# Patient Record
Sex: MALE | Race: WHITE | Employment: FULL TIME | URBAN - METROPOLITAN AREA
[De-identification: names, ages, dates, MRNs, and addresses within clinical notes are randomized per-mention and may not be internally consistent; named-entity substitution may affect disease eponyms.]

---

## 2018-07-20 ENCOUNTER — TRANSFERRED RECORDS (OUTPATIENT)
Dept: HEALTH INFORMATION MANAGEMENT | Facility: CLINIC | Age: 38
End: 2018-07-20

## 2018-07-21 ENCOUNTER — APPOINTMENT (OUTPATIENT)
Dept: GENERAL RADIOLOGY | Facility: CLINIC | Age: 38
End: 2018-07-21
Attending: THORACIC SURGERY (CARDIOTHORACIC VASCULAR SURGERY)
Payer: COMMERCIAL

## 2018-07-21 ENCOUNTER — ANESTHESIA (OUTPATIENT)
Dept: SURGERY | Facility: CLINIC | Age: 38
End: 2018-07-21
Payer: COMMERCIAL

## 2018-07-21 ENCOUNTER — HOSPITAL ENCOUNTER (INPATIENT)
Facility: CLINIC | Age: 38
LOS: 12 days | Discharge: HOME HEALTH | End: 2018-08-02
Attending: THORACIC SURGERY (CARDIOTHORACIC VASCULAR SURGERY) | Admitting: ANESTHESIOLOGY
Payer: COMMERCIAL

## 2018-07-21 ENCOUNTER — APPOINTMENT (OUTPATIENT)
Dept: CT IMAGING | Facility: CLINIC | Age: 38
End: 2018-07-21
Attending: THORACIC SURGERY (CARDIOTHORACIC VASCULAR SURGERY)
Payer: COMMERCIAL

## 2018-07-21 ENCOUNTER — ANESTHESIA EVENT (OUTPATIENT)
Dept: SURGERY | Facility: CLINIC | Age: 38
End: 2018-07-21
Payer: COMMERCIAL

## 2018-07-21 DIAGNOSIS — R19.4 BOWEL HABIT CHANGES: ICD-10-CM

## 2018-07-21 DIAGNOSIS — K22.3 ESOPHAGEAL PERFORATION: Primary | ICD-10-CM

## 2018-07-21 LAB
ABO + RH BLD: NORMAL
ABO + RH BLD: NORMAL
ANION GAP SERPL CALCULATED.3IONS-SCNC: 8 MMOL/L (ref 3–14)
BASE DEFICIT BLDA-SCNC: 2.3 MMOL/L
BASE DEFICIT BLDA-SCNC: 3.6 MMOL/L
BLD GP AB SCN SERPL QL: NORMAL
BLD PROD TYP BPU: NORMAL
BLD UNIT ID BPU: 0
BLD UNIT ID BPU: 0
BLOOD BANK CMNT PATIENT-IMP: NORMAL
BLOOD PRODUCT CODE: NORMAL
BLOOD PRODUCT CODE: NORMAL
BPU ID: NORMAL
BPU ID: NORMAL
BUN SERPL-MCNC: 25 MG/DL (ref 7–30)
CA-I BLD-MCNC: 4.3 MG/DL (ref 4.4–5.2)
CA-I BLD-MCNC: 4.6 MG/DL (ref 4.4–5.2)
CALCIUM SERPL-MCNC: 8.2 MG/DL (ref 8.5–10.1)
CHLORIDE SERPL-SCNC: 110 MMOL/L (ref 94–109)
CO2 SERPL-SCNC: 22 MMOL/L (ref 20–32)
CREAT SERPL-MCNC: 0.96 MG/DL (ref 0.66–1.25)
CREAT SERPL-MCNC: 0.97 MG/DL (ref 0.66–1.25)
ERYTHROCYTE [DISTWIDTH] IN BLOOD BY AUTOMATED COUNT: 13 % (ref 10–15)
GFR SERPL CREATININE-BSD FRML MDRD: 86 ML/MIN/1.7M2
GFR SERPL CREATININE-BSD FRML MDRD: 87 ML/MIN/1.7M2
GLUCOSE BLD-MCNC: 136 MG/DL (ref 70–99)
GLUCOSE BLD-MCNC: 149 MG/DL (ref 70–99)
GLUCOSE BLDC GLUCOMTR-MCNC: 110 MG/DL (ref 70–99)
GLUCOSE BLDC GLUCOMTR-MCNC: 130 MG/DL (ref 70–99)
GLUCOSE BLDC GLUCOMTR-MCNC: 140 MG/DL (ref 70–99)
GLUCOSE SERPL-MCNC: 162 MG/DL (ref 70–99)
HBA1C MFR BLD: 5.2 % (ref 0–5.6)
HCO3 BLD-SCNC: 21 MMOL/L (ref 21–28)
HCO3 BLD-SCNC: 22 MMOL/L (ref 21–28)
HCT VFR BLD AUTO: 41.4 % (ref 40–53)
HGB BLD-MCNC: 13.1 G/DL (ref 13.3–17.7)
HGB BLD-MCNC: 14 G/DL (ref 13.3–17.7)
HGB BLD-MCNC: 14.3 G/DL (ref 13.3–17.7)
INR PPP: 1.08 (ref 0.86–1.14)
LACTATE BLD-SCNC: 1.6 MMOL/L (ref 0.7–2)
LACTATE BLD-SCNC: 2 MMOL/L (ref 0.7–2)
MCH RBC QN AUTO: 29.5 PG (ref 26.5–33)
MCHC RBC AUTO-ENTMCNC: 34.5 G/DL (ref 31.5–36.5)
MCV RBC AUTO: 86 FL (ref 78–100)
MRSA DNA SPEC QL NAA+PROBE: NEGATIVE
NUM BPU REQUESTED: 2
O2/TOTAL GAS SETTING VFR VENT: ABNORMAL %
O2/TOTAL GAS SETTING VFR VENT: ABNORMAL %
PCO2 BLD: 34 MM HG (ref 35–45)
PCO2 BLD: 36 MM HG (ref 35–45)
PH BLD: 7.39 PH (ref 7.35–7.45)
PH BLD: 7.4 PH (ref 7.35–7.45)
PLATELET # BLD AUTO: 182 10E9/L (ref 150–450)
PLATELET # BLD AUTO: 199 10E9/L (ref 150–450)
PO2 BLD: 134 MM HG (ref 80–105)
PO2 BLD: 327 MM HG (ref 80–105)
POTASSIUM BLD-SCNC: 3.8 MMOL/L (ref 3.4–5.3)
POTASSIUM BLD-SCNC: 3.9 MMOL/L (ref 3.4–5.3)
POTASSIUM SERPL-SCNC: 4.3 MMOL/L (ref 3.4–5.3)
RBC # BLD AUTO: 4.84 10E12/L (ref 4.4–5.9)
SODIUM BLD-SCNC: 136 MMOL/L (ref 133–144)
SODIUM BLD-SCNC: 137 MMOL/L (ref 133–144)
SODIUM SERPL-SCNC: 140 MMOL/L (ref 133–144)
SPECIMEN EXP DATE BLD: NORMAL
SPECIMEN SOURCE: NORMAL
TRANSFUSION STATUS PATIENT QL: NORMAL
WBC # BLD AUTO: 14.4 10E9/L (ref 4–11)

## 2018-07-21 PROCEDURE — 36000064 ZZH SURGERY LEVEL 4 EA 15 ADDTL MIN - UMMC: Performed by: THORACIC SURGERY (CARDIOTHORACIC VASCULAR SURGERY)

## 2018-07-21 PROCEDURE — 85027 COMPLETE CBC AUTOMATED: CPT | Performed by: THORACIC SURGERY (CARDIOTHORACIC VASCULAR SURGERY)

## 2018-07-21 PROCEDURE — 86900 BLOOD TYPING SEROLOGIC ABO: CPT | Performed by: THORACIC SURGERY (CARDIOTHORACIC VASCULAR SURGERY)

## 2018-07-21 PROCEDURE — 00000146 ZZHCL STATISTIC GLUCOSE BY METER IP

## 2018-07-21 PROCEDURE — 25500064 ZZH RX 255 OP 636: Performed by: THORACIC SURGERY (CARDIOTHORACIC VASCULAR SURGERY)

## 2018-07-21 PROCEDURE — 86850 RBC ANTIBODY SCREEN: CPT | Performed by: THORACIC SURGERY (CARDIOTHORACIC VASCULAR SURGERY)

## 2018-07-21 PROCEDURE — 25000128 H RX IP 250 OP 636: Performed by: ANESTHESIOLOGY

## 2018-07-21 PROCEDURE — 0DU507Z SUPPLEMENT ESOPHAGUS WITH AUTOLOGOUS TISSUE SUBSTITUTE, OPEN APPROACH: ICD-10-PCS | Performed by: THORACIC SURGERY (CARDIOTHORACIC VASCULAR SURGERY)

## 2018-07-21 PROCEDURE — 82947 ASSAY GLUCOSE BLOOD QUANT: CPT | Performed by: THORACIC SURGERY (CARDIOTHORACIC VASCULAR SURGERY)

## 2018-07-21 PROCEDURE — 80048 BASIC METABOLIC PNL TOTAL CA: CPT | Performed by: THORACIC SURGERY (CARDIOTHORACIC VASCULAR SURGERY)

## 2018-07-21 PROCEDURE — 36415 COLL VENOUS BLD VENIPUNCTURE: CPT | Performed by: THORACIC SURGERY (CARDIOTHORACIC VASCULAR SURGERY)

## 2018-07-21 PROCEDURE — 25000128 H RX IP 250 OP 636: Performed by: NURSE ANESTHETIST, CERTIFIED REGISTERED

## 2018-07-21 PROCEDURE — 86923 COMPATIBILITY TEST ELECTRIC: CPT | Performed by: THORACIC SURGERY (CARDIOTHORACIC VASCULAR SURGERY)

## 2018-07-21 PROCEDURE — 84295 ASSAY OF SERUM SODIUM: CPT | Performed by: THORACIC SURGERY (CARDIOTHORACIC VASCULAR SURGERY)

## 2018-07-21 PROCEDURE — 71260 CT THORAX DX C+: CPT

## 2018-07-21 PROCEDURE — 25000128 H RX IP 250 OP 636: Performed by: THORACIC SURGERY (CARDIOTHORACIC VASCULAR SURGERY)

## 2018-07-21 PROCEDURE — 82565 ASSAY OF CREATININE: CPT | Performed by: THORACIC SURGERY (CARDIOTHORACIC VASCULAR SURGERY)

## 2018-07-21 PROCEDURE — C9399 UNCLASSIFIED DRUGS OR BIOLOG: HCPCS | Performed by: NURSE ANESTHETIST, CERTIFIED REGISTERED

## 2018-07-21 PROCEDURE — 71000014 ZZH RECOVERY PHASE 1 LEVEL 2 FIRST HR: Performed by: THORACIC SURGERY (CARDIOTHORACIC VASCULAR SURGERY)

## 2018-07-21 PROCEDURE — 25000125 ZZHC RX 250: Performed by: NURSE ANESTHETIST, CERTIFIED REGISTERED

## 2018-07-21 PROCEDURE — 25000131 ZZH RX MED GY IP 250 OP 636 PS 637: Performed by: SURGERY

## 2018-07-21 PROCEDURE — 36000062 ZZH SURGERY LEVEL 4 1ST 30 MIN - UMMC: Performed by: THORACIC SURGERY (CARDIOTHORACIC VASCULAR SURGERY)

## 2018-07-21 PROCEDURE — 0DQ50ZZ REPAIR ESOPHAGUS, OPEN APPROACH: ICD-10-PCS | Performed by: THORACIC SURGERY (CARDIOTHORACIC VASCULAR SURGERY)

## 2018-07-21 PROCEDURE — 25000128 H RX IP 250 OP 636

## 2018-07-21 PROCEDURE — 83605 ASSAY OF LACTIC ACID: CPT | Performed by: THORACIC SURGERY (CARDIOTHORACIC VASCULAR SURGERY)

## 2018-07-21 PROCEDURE — C1769 GUIDE WIRE: HCPCS | Performed by: THORACIC SURGERY (CARDIOTHORACIC VASCULAR SURGERY)

## 2018-07-21 PROCEDURE — 25000128 H RX IP 250 OP 636: Performed by: STUDENT IN AN ORGANIZED HEALTH CARE EDUCATION/TRAINING PROGRAM

## 2018-07-21 PROCEDURE — 40000277 XR SURGERY CARM FLUORO LESS THAN 5 MIN W STILLS: Mod: TC

## 2018-07-21 PROCEDURE — 40000986 XR CHEST PORT 1 VW

## 2018-07-21 PROCEDURE — C9290 INJ, BUPIVACAINE LIPOSOME: HCPCS | Performed by: THORACIC SURGERY (CARDIOTHORACIC VASCULAR SURGERY)

## 2018-07-21 PROCEDURE — 85610 PROTHROMBIN TIME: CPT | Performed by: THORACIC SURGERY (CARDIOTHORACIC VASCULAR SURGERY)

## 2018-07-21 PROCEDURE — 82330 ASSAY OF CALCIUM: CPT | Performed by: THORACIC SURGERY (CARDIOTHORACIC VASCULAR SURGERY)

## 2018-07-21 PROCEDURE — 0DJ08ZZ INSPECTION OF UPPER INTESTINAL TRACT, VIA NATURAL OR ARTIFICIAL OPENING ENDOSCOPIC: ICD-10-PCS | Performed by: THORACIC SURGERY (CARDIOTHORACIC VASCULAR SURGERY)

## 2018-07-21 PROCEDURE — 0BJ08ZZ INSPECTION OF TRACHEOBRONCHIAL TREE, VIA NATURAL OR ARTIFICIAL OPENING ENDOSCOPIC: ICD-10-PCS | Performed by: THORACIC SURGERY (CARDIOTHORACIC VASCULAR SURGERY)

## 2018-07-21 PROCEDURE — 0KXJ0ZZ TRANSFER LEFT THORAX MUSCLE, OPEN APPROACH: ICD-10-PCS | Performed by: THORACIC SURGERY (CARDIOTHORACIC VASCULAR SURGERY)

## 2018-07-21 PROCEDURE — 25000128 H RX IP 250 OP 636: Performed by: SURGERY

## 2018-07-21 PROCEDURE — C1894 INTRO/SHEATH, NON-LASER: HCPCS | Performed by: THORACIC SURGERY (CARDIOTHORACIC VASCULAR SURGERY)

## 2018-07-21 PROCEDURE — 20000004 ZZH R&B ICU UMMC

## 2018-07-21 PROCEDURE — 0D750DZ DILATION OF ESOPHAGUS WITH INTRALUMINAL DEVICE, OPEN APPROACH: ICD-10-PCS | Performed by: THORACIC SURGERY (CARDIOTHORACIC VASCULAR SURGERY)

## 2018-07-21 PROCEDURE — 37000008 ZZH ANESTHESIA TECHNICAL FEE, 1ST 30 MIN: Performed by: THORACIC SURGERY (CARDIOTHORACIC VASCULAR SURGERY)

## 2018-07-21 PROCEDURE — 37000009 ZZH ANESTHESIA TECHNICAL FEE, EACH ADDTL 15 MIN: Performed by: THORACIC SURGERY (CARDIOTHORACIC VASCULAR SURGERY)

## 2018-07-21 PROCEDURE — 87641 MR-STAPH DNA AMP PROBE: CPT | Performed by: THORACIC SURGERY (CARDIOTHORACIC VASCULAR SURGERY)

## 2018-07-21 PROCEDURE — 0W9930Z DRAINAGE OF RIGHT PLEURAL CAVITY WITH DRAINAGE DEVICE, PERCUTANEOUS APPROACH: ICD-10-PCS | Performed by: THORACIC SURGERY (CARDIOTHORACIC VASCULAR SURGERY)

## 2018-07-21 PROCEDURE — 87640 STAPH A DNA AMP PROBE: CPT | Performed by: THORACIC SURGERY (CARDIOTHORACIC VASCULAR SURGERY)

## 2018-07-21 PROCEDURE — 83036 HEMOGLOBIN GLYCOSYLATED A1C: CPT | Performed by: THORACIC SURGERY (CARDIOTHORACIC VASCULAR SURGERY)

## 2018-07-21 PROCEDURE — 27210794 ZZH OR GENERAL SUPPLY STERILE: Performed by: THORACIC SURGERY (CARDIOTHORACIC VASCULAR SURGERY)

## 2018-07-21 PROCEDURE — 84132 ASSAY OF SERUM POTASSIUM: CPT | Performed by: THORACIC SURGERY (CARDIOTHORACIC VASCULAR SURGERY)

## 2018-07-21 PROCEDURE — 71000015 ZZH RECOVERY PHASE 1 LEVEL 2 EA ADDTL HR: Performed by: THORACIC SURGERY (CARDIOTHORACIC VASCULAR SURGERY)

## 2018-07-21 PROCEDURE — C1874 STENT, COATED/COV W/DEL SYS: HCPCS | Performed by: THORACIC SURGERY (CARDIOTHORACIC VASCULAR SURGERY)

## 2018-07-21 PROCEDURE — P9016 RBC LEUKOCYTES REDUCED: HCPCS | Performed by: THORACIC SURGERY (CARDIOTHORACIC VASCULAR SURGERY)

## 2018-07-21 PROCEDURE — 0DHA3UZ INSERTION OF FEEDING DEVICE INTO JEJUNUM, PERCUTANEOUS APPROACH: ICD-10-PCS | Performed by: THORACIC SURGERY (CARDIOTHORACIC VASCULAR SURGERY)

## 2018-07-21 PROCEDURE — 86901 BLOOD TYPING SEROLOGIC RH(D): CPT | Performed by: THORACIC SURGERY (CARDIOTHORACIC VASCULAR SURGERY)

## 2018-07-21 PROCEDURE — 71045 X-RAY EXAM CHEST 1 VIEW: CPT

## 2018-07-21 PROCEDURE — 85049 AUTOMATED PLATELET COUNT: CPT | Performed by: THORACIC SURGERY (CARDIOTHORACIC VASCULAR SURGERY)

## 2018-07-21 PROCEDURE — C1887 CATHETER, GUIDING: HCPCS | Performed by: THORACIC SURGERY (CARDIOTHORACIC VASCULAR SURGERY)

## 2018-07-21 PROCEDURE — 82803 BLOOD GASES ANY COMBINATION: CPT | Performed by: THORACIC SURGERY (CARDIOTHORACIC VASCULAR SURGERY)

## 2018-07-21 PROCEDURE — 0DH63UZ INSERTION OF FEEDING DEVICE INTO STOMACH, PERCUTANEOUS APPROACH: ICD-10-PCS | Performed by: THORACIC SURGERY (CARDIOTHORACIC VASCULAR SURGERY)

## 2018-07-21 PROCEDURE — 25000565 ZZH ISOFLURANE, EA 15 MIN: Performed by: THORACIC SURGERY (CARDIOTHORACIC VASCULAR SURGERY)

## 2018-07-21 DEVICE — IMPLANTABLE DEVICE
Type: IMPLANTABLE DEVICE | Site: ESOPHAGUS | Status: NON-FUNCTIONAL
Removed: 2018-08-15

## 2018-07-21 RX ORDER — IOPAMIDOL 755 MG/ML
80 INJECTION, SOLUTION INTRAVASCULAR ONCE
Status: COMPLETED | OUTPATIENT
Start: 2018-07-21 | End: 2018-07-21

## 2018-07-21 RX ORDER — DEXTROSE MONOHYDRATE 25 G/50ML
25-50 INJECTION, SOLUTION INTRAVENOUS
Status: DISCONTINUED | OUTPATIENT
Start: 2018-07-21 | End: 2018-07-21

## 2018-07-21 RX ORDER — POTASSIUM CHLORIDE 29.8 MG/ML
20 INJECTION INTRAVENOUS
Status: DISCONTINUED | OUTPATIENT
Start: 2018-07-21 | End: 2018-08-02 | Stop reason: HOSPADM

## 2018-07-21 RX ORDER — PROPOFOL 10 MG/ML
INJECTION, EMULSION INTRAVENOUS PRN
Status: DISCONTINUED | OUTPATIENT
Start: 2018-07-21 | End: 2018-07-21

## 2018-07-21 RX ORDER — FENTANYL CITRATE 50 UG/ML
25-50 INJECTION, SOLUTION INTRAMUSCULAR; INTRAVENOUS
Status: DISCONTINUED | OUTPATIENT
Start: 2018-07-21 | End: 2018-07-21 | Stop reason: HOSPADM

## 2018-07-21 RX ORDER — CALCIUM CHLORIDE 100 MG/ML
INJECTION INTRAVENOUS; INTRAVENTRICULAR PRN
Status: DISCONTINUED | OUTPATIENT
Start: 2018-07-21 | End: 2018-07-21

## 2018-07-21 RX ORDER — NALOXONE HYDROCHLORIDE 0.4 MG/ML
.1-.4 INJECTION, SOLUTION INTRAMUSCULAR; INTRAVENOUS; SUBCUTANEOUS
Status: DISCONTINUED | OUTPATIENT
Start: 2018-07-21 | End: 2018-07-21

## 2018-07-21 RX ORDER — ONDANSETRON 4 MG/1
4 TABLET, ORALLY DISINTEGRATING ORAL EVERY 6 HOURS PRN
Status: DISCONTINUED | OUTPATIENT
Start: 2018-07-21 | End: 2018-08-02 | Stop reason: HOSPADM

## 2018-07-21 RX ORDER — ONDANSETRON 2 MG/ML
4 INJECTION INTRAMUSCULAR; INTRAVENOUS EVERY 30 MIN PRN
Status: DISCONTINUED | OUTPATIENT
Start: 2018-07-21 | End: 2018-07-21 | Stop reason: HOSPADM

## 2018-07-21 RX ORDER — PROCHLORPERAZINE MALEATE 5 MG
10 TABLET ORAL EVERY 6 HOURS PRN
Status: DISCONTINUED | OUTPATIENT
Start: 2018-07-21 | End: 2018-08-02 | Stop reason: HOSPADM

## 2018-07-21 RX ORDER — ONDANSETRON 2 MG/ML
4 INJECTION INTRAMUSCULAR; INTRAVENOUS EVERY 6 HOURS PRN
Status: DISCONTINUED | OUTPATIENT
Start: 2018-07-21 | End: 2018-07-21

## 2018-07-21 RX ORDER — NICOTINE POLACRILEX 4 MG
15-30 LOZENGE BUCCAL
Status: DISCONTINUED | OUTPATIENT
Start: 2018-07-21 | End: 2018-08-02 | Stop reason: HOSPADM

## 2018-07-21 RX ORDER — MAGNESIUM SULFATE HEPTAHYDRATE 40 MG/ML
2 INJECTION, SOLUTION INTRAVENOUS DAILY PRN
Status: DISCONTINUED | OUTPATIENT
Start: 2018-07-21 | End: 2018-08-02 | Stop reason: HOSPADM

## 2018-07-21 RX ORDER — FLUCONAZOLE 2 MG/ML
400 INJECTION, SOLUTION INTRAVENOUS EVERY 24 HOURS
Status: DISCONTINUED | OUTPATIENT
Start: 2018-07-21 | End: 2018-07-30

## 2018-07-21 RX ORDER — LIDOCAINE HYDROCHLORIDE 20 MG/ML
INJECTION, SOLUTION INFILTRATION; PERINEURAL PRN
Status: DISCONTINUED | OUTPATIENT
Start: 2018-07-21 | End: 2018-07-21

## 2018-07-21 RX ORDER — SODIUM CHLORIDE, SODIUM LACTATE, POTASSIUM CHLORIDE, CALCIUM CHLORIDE 600; 310; 30; 20 MG/100ML; MG/100ML; MG/100ML; MG/100ML
INJECTION, SOLUTION INTRAVENOUS
Status: COMPLETED
Start: 2018-07-21 | End: 2018-07-21

## 2018-07-21 RX ORDER — POTASSIUM CHLORIDE 750 MG/1
20-40 TABLET, EXTENDED RELEASE ORAL
Status: DISCONTINUED | OUTPATIENT
Start: 2018-07-21 | End: 2018-08-02 | Stop reason: HOSPADM

## 2018-07-21 RX ORDER — NALOXONE HYDROCHLORIDE 0.4 MG/ML
.1-.4 INJECTION, SOLUTION INTRAMUSCULAR; INTRAVENOUS; SUBCUTANEOUS
Status: DISCONTINUED | OUTPATIENT
Start: 2018-07-21 | End: 2018-08-02 | Stop reason: HOSPADM

## 2018-07-21 RX ORDER — POTASSIUM CHLORIDE 7.45 MG/ML
10 INJECTION INTRAVENOUS
Status: DISCONTINUED | OUTPATIENT
Start: 2018-07-21 | End: 2018-08-02 | Stop reason: HOSPADM

## 2018-07-21 RX ORDER — ONDANSETRON 4 MG/1
4 TABLET, ORALLY DISINTEGRATING ORAL EVERY 30 MIN PRN
Status: DISCONTINUED | OUTPATIENT
Start: 2018-07-21 | End: 2018-07-21 | Stop reason: HOSPADM

## 2018-07-21 RX ORDER — HYDROMORPHONE HYDROCHLORIDE 1 MG/ML
.3-.5 INJECTION, SOLUTION INTRAMUSCULAR; INTRAVENOUS; SUBCUTANEOUS
Status: DISCONTINUED | OUTPATIENT
Start: 2018-07-21 | End: 2018-07-21

## 2018-07-21 RX ORDER — ACETAMINOPHEN 10 MG/ML
1000 INJECTION, SOLUTION INTRAVENOUS ONCE
Status: COMPLETED | OUTPATIENT
Start: 2018-07-21 | End: 2018-07-21

## 2018-07-21 RX ORDER — POTASSIUM CL/LIDO/0.9 % NACL 10MEQ/0.1L
10 INTRAVENOUS SOLUTION, PIGGYBACK (ML) INTRAVENOUS
Status: DISCONTINUED | OUTPATIENT
Start: 2018-07-21 | End: 2018-08-02 | Stop reason: HOSPADM

## 2018-07-21 RX ORDER — PIPERACILLIN SODIUM, TAZOBACTAM SODIUM 3; .375 G/15ML; G/15ML
3.38 INJECTION, POWDER, LYOPHILIZED, FOR SOLUTION INTRAVENOUS EVERY 6 HOURS
Status: DISCONTINUED | OUTPATIENT
Start: 2018-07-21 | End: 2018-07-21

## 2018-07-21 RX ORDER — SODIUM CHLORIDE, SODIUM LACTATE, POTASSIUM CHLORIDE, CALCIUM CHLORIDE 600; 310; 30; 20 MG/100ML; MG/100ML; MG/100ML; MG/100ML
INJECTION, SOLUTION INTRAVENOUS CONTINUOUS
Status: DISCONTINUED | OUTPATIENT
Start: 2018-07-21 | End: 2018-07-22

## 2018-07-21 RX ORDER — HYDROMORPHONE HYDROCHLORIDE 1 MG/ML
.5-1 INJECTION, SOLUTION INTRAMUSCULAR; INTRAVENOUS; SUBCUTANEOUS
Status: DISCONTINUED | OUTPATIENT
Start: 2018-07-21 | End: 2018-07-22

## 2018-07-21 RX ORDER — ONDANSETRON 2 MG/ML
INJECTION INTRAMUSCULAR; INTRAVENOUS PRN
Status: DISCONTINUED | OUTPATIENT
Start: 2018-07-21 | End: 2018-07-21

## 2018-07-21 RX ORDER — HYDROMORPHONE HYDROCHLORIDE 1 MG/ML
.3-.5 INJECTION, SOLUTION INTRAMUSCULAR; INTRAVENOUS; SUBCUTANEOUS
Status: DISCONTINUED | OUTPATIENT
Start: 2018-07-21 | End: 2018-07-21 | Stop reason: ALTCHOICE

## 2018-07-21 RX ORDER — ONDANSETRON 2 MG/ML
4 INJECTION INTRAMUSCULAR; INTRAVENOUS EVERY 6 HOURS PRN
Status: DISCONTINUED | OUTPATIENT
Start: 2018-07-21 | End: 2018-08-02 | Stop reason: HOSPADM

## 2018-07-21 RX ORDER — POTASSIUM CHLORIDE 1.5 G/1.58G
20-40 POWDER, FOR SOLUTION ORAL
Status: DISCONTINUED | OUTPATIENT
Start: 2018-07-21 | End: 2018-08-02 | Stop reason: HOSPADM

## 2018-07-21 RX ORDER — NICOTINE POLACRILEX 4 MG
15-30 LOZENGE BUCCAL
Status: DISCONTINUED | OUTPATIENT
Start: 2018-07-21 | End: 2018-07-21

## 2018-07-21 RX ORDER — MULTIVITAMIN
1 CAPSULE ORAL DAILY
COMMUNITY
Start: 2018-03-27

## 2018-07-21 RX ORDER — FENTANYL CITRATE 50 UG/ML
INJECTION, SOLUTION INTRAMUSCULAR; INTRAVENOUS PRN
Status: DISCONTINUED | OUTPATIENT
Start: 2018-07-21 | End: 2018-07-21

## 2018-07-21 RX ORDER — ESMOLOL HYDROCHLORIDE 10 MG/ML
INJECTION INTRAVENOUS PRN
Status: DISCONTINUED | OUTPATIENT
Start: 2018-07-21 | End: 2018-07-21

## 2018-07-21 RX ORDER — ONDANSETRON 4 MG/1
4 TABLET, ORALLY DISINTEGRATING ORAL EVERY 6 HOURS PRN
Status: DISCONTINUED | OUTPATIENT
Start: 2018-07-21 | End: 2018-07-21

## 2018-07-21 RX ORDER — CAFFEINE 200 MG
200 TABLET ORAL DAILY PRN
Status: ON HOLD | COMMUNITY
End: 2018-08-01

## 2018-07-21 RX ORDER — MAGNESIUM SULFATE HEPTAHYDRATE 40 MG/ML
4 INJECTION, SOLUTION INTRAVENOUS EVERY 4 HOURS PRN
Status: DISCONTINUED | OUTPATIENT
Start: 2018-07-21 | End: 2018-08-02 | Stop reason: HOSPADM

## 2018-07-21 RX ORDER — PIPERACILLIN SODIUM, TAZOBACTAM SODIUM 3; .375 G/15ML; G/15ML
3.38 INJECTION, POWDER, LYOPHILIZED, FOR SOLUTION INTRAVENOUS EVERY 6 HOURS
Status: DISCONTINUED | OUTPATIENT
Start: 2018-07-21 | End: 2018-07-30

## 2018-07-21 RX ORDER — HYDROMORPHONE HYDROCHLORIDE 1 MG/ML
.3-.5 INJECTION, SOLUTION INTRAMUSCULAR; INTRAVENOUS; SUBCUTANEOUS EVERY 5 MIN PRN
Status: DISCONTINUED | OUTPATIENT
Start: 2018-07-21 | End: 2018-07-21 | Stop reason: HOSPADM

## 2018-07-21 RX ORDER — DEXTROSE MONOHYDRATE 25 G/50ML
25-50 INJECTION, SOLUTION INTRAVENOUS
Status: DISCONTINUED | OUTPATIENT
Start: 2018-07-21 | End: 2018-08-02 | Stop reason: HOSPADM

## 2018-07-21 RX ORDER — SODIUM CHLORIDE, SODIUM LACTATE, POTASSIUM CHLORIDE, CALCIUM CHLORIDE 600; 310; 30; 20 MG/100ML; MG/100ML; MG/100ML; MG/100ML
INJECTION, SOLUTION INTRAVENOUS CONTINUOUS
Status: DISCONTINUED | OUTPATIENT
Start: 2018-07-21 | End: 2018-07-21 | Stop reason: HOSPADM

## 2018-07-21 RX ADMIN — PIPERACILLIN SODIUM AND TAZOBACTAM SODIUM 2.25 G: 3; .375 INJECTION, POWDER, LYOPHILIZED, FOR SOLUTION INTRAVENOUS at 08:35

## 2018-07-21 RX ADMIN — Medication 0.5 MG: at 03:33

## 2018-07-21 RX ADMIN — ESMOLOL HYDROCHLORIDE 60 MG: 10 INJECTION, SOLUTION INTRAVENOUS at 05:57

## 2018-07-21 RX ADMIN — PIPERACILLIN SODIUM AND TAZOBACTAM SODIUM 3.38 G: 3; .375 INJECTION, POWDER, LYOPHILIZED, FOR SOLUTION INTRAVENOUS at 16:14

## 2018-07-21 RX ADMIN — ONDANSETRON 4 MG: 2 INJECTION INTRAMUSCULAR; INTRAVENOUS at 09:49

## 2018-07-21 RX ADMIN — FENTANYL CITRATE 50 MCG: 50 INJECTION, SOLUTION INTRAMUSCULAR; INTRAVENOUS at 07:11

## 2018-07-21 RX ADMIN — FENTANYL CITRATE 100 MCG: 50 INJECTION, SOLUTION INTRAMUSCULAR; INTRAVENOUS at 06:33

## 2018-07-21 RX ADMIN — PIPERACILLIN SODIUM AND TAZOBACTAM SODIUM 2.25 G: 3; .375 INJECTION, POWDER, LYOPHILIZED, FOR SOLUTION INTRAVENOUS at 10:34

## 2018-07-21 RX ADMIN — CALCIUM CHLORIDE 300 MG: 100 INJECTION, SOLUTION INTRAVENOUS at 08:37

## 2018-07-21 RX ADMIN — HYDROMORPHONE HYDROCHLORIDE: 10 INJECTION, SOLUTION INTRAMUSCULAR; INTRAVENOUS; SUBCUTANEOUS at 12:35

## 2018-07-21 RX ADMIN — IOPAMIDOL 80 ML: 755 INJECTION, SOLUTION INTRAVENOUS at 03:02

## 2018-07-21 RX ADMIN — MIDAZOLAM 2 MG: 1 INJECTION INTRAMUSCULAR; INTRAVENOUS at 05:35

## 2018-07-21 RX ADMIN — VANCOMYCIN HYDROCHLORIDE 1750 MG: 10 INJECTION, POWDER, LYOPHILIZED, FOR SOLUTION INTRAVENOUS at 16:45

## 2018-07-21 RX ADMIN — LIDOCAINE HYDROCHLORIDE 60 MG: 20 INJECTION, SOLUTION INFILTRATION; PERINEURAL at 05:57

## 2018-07-21 RX ADMIN — ROCURONIUM BROMIDE 30 MG: 10 INJECTION INTRAVENOUS at 08:22

## 2018-07-21 RX ADMIN — PROPOFOL 160 MG: 10 INJECTION, EMULSION INTRAVENOUS at 05:57

## 2018-07-21 RX ADMIN — SODIUM CHLORIDE, POTASSIUM CHLORIDE, SODIUM LACTATE AND CALCIUM CHLORIDE 1000 ML: 600; 310; 30; 20 INJECTION, SOLUTION INTRAVENOUS at 01:00

## 2018-07-21 RX ADMIN — FLUCONAZOLE IN SODIUM CHLORIDE 400 MG: 2 INJECTION, SOLUTION INTRAVENOUS at 03:37

## 2018-07-21 RX ADMIN — ACETAMINOPHEN 1000 MG: 10 INJECTION, SOLUTION INTRAVENOUS at 05:06

## 2018-07-21 RX ADMIN — SODIUM CHLORIDE, POTASSIUM CHLORIDE, SODIUM LACTATE AND CALCIUM CHLORIDE: 600; 310; 30; 20 INJECTION, SOLUTION INTRAVENOUS at 12:25

## 2018-07-21 RX ADMIN — VANCOMYCIN HYDROCHLORIDE 1750 MG: 10 INJECTION, POWDER, LYOPHILIZED, FOR SOLUTION INTRAVENOUS at 05:21

## 2018-07-21 RX ADMIN — SODIUM CHLORIDE, POTASSIUM CHLORIDE, SODIUM LACTATE AND CALCIUM CHLORIDE: 600; 310; 30; 20 INJECTION, SOLUTION INTRAVENOUS at 01:36

## 2018-07-21 RX ADMIN — PHENYLEPHRINE HYDROCHLORIDE 50 MCG: 10 INJECTION, SOLUTION INTRAMUSCULAR; INTRAVENOUS; SUBCUTANEOUS at 10:11

## 2018-07-21 RX ADMIN — FENTANYL CITRATE 100 MCG: 50 INJECTION, SOLUTION INTRAMUSCULAR; INTRAVENOUS at 08:38

## 2018-07-21 RX ADMIN — PHENYLEPHRINE HYDROCHLORIDE 50 MCG: 10 INJECTION, SOLUTION INTRAMUSCULAR; INTRAVENOUS; SUBCUTANEOUS at 10:08

## 2018-07-21 RX ADMIN — FENTANYL CITRATE 100 MCG: 50 INJECTION, SOLUTION INTRAMUSCULAR; INTRAVENOUS at 08:45

## 2018-07-21 RX ADMIN — ROCURONIUM BROMIDE 50 MG: 10 INJECTION INTRAVENOUS at 06:56

## 2018-07-21 RX ADMIN — DEXMEDETOMIDINE HYDROCHLORIDE 16 MCG: 100 INJECTION, SOLUTION INTRAVENOUS at 11:27

## 2018-07-21 RX ADMIN — Medication 1 MG: at 05:27

## 2018-07-21 RX ADMIN — INSULIN ASPART 1 UNITS: 100 INJECTION, SOLUTION INTRAVENOUS; SUBCUTANEOUS at 03:42

## 2018-07-21 RX ADMIN — Medication 0.5 MG: at 02:16

## 2018-07-21 RX ADMIN — DEXMEDETOMIDINE HYDROCHLORIDE 8 MCG: 100 INJECTION, SOLUTION INTRAVENOUS at 11:35

## 2018-07-21 RX ADMIN — ROCURONIUM BROMIDE 20 MG: 10 INJECTION INTRAVENOUS at 09:06

## 2018-07-21 RX ADMIN — ROCURONIUM BROMIDE 20 MG: 10 INJECTION INTRAVENOUS at 06:33

## 2018-07-21 RX ADMIN — PHENYLEPHRINE HYDROCHLORIDE 200 MCG: 10 INJECTION, SOLUTION INTRAMUSCULAR; INTRAVENOUS; SUBCUTANEOUS at 06:06

## 2018-07-21 RX ADMIN — Medication 1 MG: at 04:27

## 2018-07-21 RX ADMIN — FENTANYL CITRATE 100 MCG: 50 INJECTION, SOLUTION INTRAMUSCULAR; INTRAVENOUS at 08:28

## 2018-07-21 RX ADMIN — ROCURONIUM BROMIDE 80 MG: 10 INJECTION INTRAVENOUS at 05:57

## 2018-07-21 RX ADMIN — SUGAMMADEX 200 MG: 100 INJECTION, SOLUTION INTRAVENOUS at 11:08

## 2018-07-21 RX ADMIN — FENTANYL CITRATE 50 MCG: 50 INJECTION, SOLUTION INTRAMUSCULAR; INTRAVENOUS at 07:08

## 2018-07-21 RX ADMIN — HYDROMORPHONE HYDROCHLORIDE 0.5 MG: 1 INJECTION, SOLUTION INTRAMUSCULAR; INTRAVENOUS; SUBCUTANEOUS at 09:02

## 2018-07-21 RX ADMIN — PIPERACILLIN SODIUM AND TAZOBACTAM SODIUM 3.38 G: 3; .375 INJECTION, POWDER, LYOPHILIZED, FOR SOLUTION INTRAVENOUS at 04:48

## 2018-07-21 RX ADMIN — FENTANYL CITRATE 50 MCG: 50 INJECTION, SOLUTION INTRAMUSCULAR; INTRAVENOUS at 11:43

## 2018-07-21 RX ADMIN — PIPERACILLIN SODIUM AND TAZOBACTAM SODIUM 3.38 G: 3; .375 INJECTION, POWDER, LYOPHILIZED, FOR SOLUTION INTRAVENOUS at 22:08

## 2018-07-21 ASSESSMENT — ACTIVITIES OF DAILY LIVING (ADL)
DRESS: 0-->INDEPENDENT
COGNITION: 0 - NO COGNITION ISSUES REPORTED
AMBULATION: 0 - INDEPENDENT
DRESS: 0 - INDEPENDENT
ADLS_ACUITY_SCORE: 10
EATING: OTHER (SEE COMMENTS)
RETIRED_COMMUNICATION: 0-->UNDERSTANDS/COMMUNICATES WITHOUT DIFFICULTY
SWALLOWING: OTHER (SEE COMMENTS)
BATHING: 0-->INDEPENDENT
TRANSFERRING: 0-->INDEPENDENT
COMMUNICATION: 0 - UNDERSTANDS/COMMUNICATES WITHOUT DIFFICULTY
AMBULATION: 0-->INDEPENDENT
ADLS_ACUITY_SCORE: 9
TOILETING: 0 - INDEPENDENT
BATHING: 0 - INDEPENDENT
TRANSFERRING: 0 - INDEPENDENT
WHICH_OF_THE_ABOVE_FUNCTIONAL_RISKS_HAD_A_RECENT_ONSET_OR_CHANGE?: SWALLOWING
RETIRED_EATING: 0-->INDEPENDENT
SWALLOWING: 2-->DIFFICULTY SWALLOWING FOODS
CHANGE_IN_FUNCTIONAL_STATUS_SINCE_ONSET_OF_CURRENT_ILLNESS/INJURY: NO
FALL_HISTORY_WITHIN_LAST_SIX_MONTHS: NO
TOILETING: 0-->INDEPENDENT
ADLS_ACUITY_SCORE: 9

## 2018-07-21 ASSESSMENT — PAIN DESCRIPTION - DESCRIPTORS
DESCRIPTORS: SHARP

## 2018-07-21 NOTE — H&P
SURGICAL ICU ADMISSION NOTE  7/21/2018    PRIMARY TEAM: Thoracic Surgery  PRIMARY PHYSICIAN: Dr. Miranda    REASON FOR CRITICAL CARE ADMISSION: esophageal perforation   ADMITTING PHYSICIAN: Dr. Epps    ASSESSMENT: Mr Constantino is a 36 yo M who is otherwise who presents for an esophageal perforation that occurred about 12 hours ago. He is hemodynamically stable but does have a significant amount of pain. Will obtain CT of chest to evaluate perforation, free vs contained.    PLAN:   Neuro/ pain/ sedation:  - Monitor neurological status. Notify the MD for any acute changes in exam.  - Dilaudid q2h prn     Pulmonary care:   -Supplemental oxygen to keep saturation above 92 %.  -IS      Cardiovascular:    - Monitor hemodynamic status.      GI care:   - Strict NPO  - PPI  - CT chest/abdomen with PO contrast     Fluids/ Electrolytes/ Nutrition:   - 1L bolus of LR on arrival  - LR at 120 for IV fluid hydration  - ICU electrolyte replacement protocol     Renal/ Fluid Balance:    - Will monitor intake and output.  - Patient voiding spontaneously     Endocrine:    - SSI     ID/ Antibiotics:  - Zoysn, vanc, fluconazole for esophageal perforation     Heme:     - CBC now     Prophylaxis:    - Mechanical prophylaxis for DVT.  - No chemical DVT prophylaxis due to potential surgical intervention     Lines/ tubes/ drains:  - PIV     Disposition:  - Surgical ICU.    Patient seen, findings and plan discussed with surgical ICU staff.    Marjorie Steen MD  General Surgery, PGY-2  Pg [phone]    - - - - - - - - - - - - - - - - - - - - - - - - - - - - - - - - - - - - - - - - - - - - - - - - - - - - - - - - - - - - - - - - - - - - - - - -     HISTORY PRESENTING ILLNESS: Mr. Constatnino is a 36 yo M who is otherwise healthy who was eating a hand full of popcorn around 1345 on Friday 7/20. He had difficulty swallowing the popcorn and developed sudden, severe epigastric pain. He also spit up some blood just once following this episode. He  became diaphoretic and felt as if he was going to pass out. Patient reports he intermittently has difficulties swallowing food but he attributes this to not chewing his food properly. He experienced one episode 7-8 years ago where he had significant pain after swallowing a pizza roll up. He reports imaging studies were done where he swallowed contrast and did not demonstrate any abnormalities. He was found to have pneumomediastinum after a CT and MRI of the abdomen at [place]. He transferred to [facility] and eventually transferred to [facility] for definitive care.     REVIEW OF SYSTEMS: 10 point ROS neg other than the symptoms noted above in the HPI.    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    ALLERGIES:      Allergies   Allergen Reactions     Naproxen      Other reaction(s): Stomach Upset  Pt states he can tolerate it.     Sulfa Drugs Itching     Eyes became swollen after being treated with sulfa antibiotics.      MEDICATIONS:  Caffeine  Multivitamin    PHYSICAL EXAMINATION:  Temp:  [99.5  F (37.5  C)] 99.5  F (37.5  C)  Heart Rate:  [90-96] 90  Resp:  [23-24] 23  BP: (121-123)/(70-71) 121/70  SpO2:  [97 %] 97 %    General: awake, alert, NAD, poor dentition  CV: RRR  Pulm: Non-labored breathing, no crepitus  Abd: Soft, TTP in the epigastrium, no peritoneal signs  Extremities: wwp    LABS: Reviewed.   Complete Blood Count     Recent Labs  Lab 07/21/18 0130   WBC 14.4*   HGB 14.3        Basic Metabolic Panel    Recent Labs  Lab 07/21/18 0130      POTASSIUM 4.3   CHLORIDE 110*   CO2 22   BUN 25   CR 0.96   *     Liver Function Tests    Recent Labs  Lab 07/21/18 0130   INR 1.08     Pancreatic Enzymes  No lab  results found in last 7 days.  Coagulation Profile    Recent Labs  Lab 07/21/18  0130   INR 1.08       IMAGING:  No results found for this or any previous visit (from the past 24 hour(s)).

## 2018-07-21 NOTE — PHARMACY-VANCOMYCIN DOSING SERVICE
Pharmacy Vancomycin Initial Note  Date of Service 2018  Patient's  1980  37 year old, male    Indication: Esophageal Perforation    Current estimated CrCl = Estimated Creatinine Clearance: 106.8 mL/min (based on Cr of 0.96).    Creatinine for last 3 days  2018:  1:30 AM Creatinine 0.96 mg/dL    Recent Vancomycin Level(s) for last 3 days  No results found for requested labs within last 72 hours.      Vancomycin IV Administrations (past 72 hours)      No vancomycin orders with administrations in past 72 hours.                Nephrotoxins and other renal medications (Future)    Start     Dose/Rate Route Frequency Ordered Stop    18 0430  vancomycin (VANCOCIN) 1,750 mg in sodium chloride 0.9 % 500 mL intermittent infusion      1,750 mg  over 2 Hours Intravenous EVERY 12 HOURS 18 0241      18 0400  piperacillin-tazobactam (ZOSYN) 3.375 g vial to attach to  mL bag      3.375 g  over 30 Minutes Intravenous EVERY 6 HOURS 18 0216            Contrast Orders - past 72 hours     None        Plan:  1.  Start vancomycin 1750 mg IV q12h.   2.  Goal Trough Level: 15-20 mg/L   3.  Pharmacy will check trough levels as appropriate in 1-3 Days.    4. Serum creatinine levels will be ordered daily for the first week of therapy and at least twice weekly for subsequent weeks.    5. Jones method utilized to dose vancomycin therapy: Method 2    Lesley Lantigua, PharmD, BCPS

## 2018-07-21 NOTE — ANESTHESIA POSTPROCEDURE EVALUATION
Patient: Tate Constantino    Procedure(s):  Esophagogastroduodenoscopy, Percutaneous Gastrostomy tube placement, ENDOSCOPIC INSERTION TUBE GASTROSTOMY-Jejunostomy placement, Right chest tube placement, Left thoracotomy Repair, Esophageal Stent placement with C-Arm, Flexible Bronchoscopy - Wound Class: III-Contaminated  ENDOSCOPIC INSERTION TUBE JEJUNOSTOMY  - Wound Class: I-Clean  LEFT THORACOTOMY TO REPAIRE ESOHAGEAL PERFORATION - Wound Class: II-Clean Contaminated    Diagnosis:ESOPHAGEAL PERFORATION  Diagnosis Additional Information: No value filed.    Anesthesia Type:  General    Note:  Anesthesia Post Evaluation    Patient location during evaluation: PACU  Patient participation: Able to fully participate in evaluation  Level of consciousness: awake and alert  Pain management: adequate  Airway patency: patent  Cardiovascular status: acceptable and hemodynamically stable  Respiratory status: acceptable  Hydration status: acceptable  PONV: none     Anesthetic complications: None          Last vitals:  Vitals:    07/21/18 1145 07/21/18 1200 07/21/18 1215   BP: 147/87 135/86 118/73   Resp: 24 24 23   Temp:   36.6  C (97.8  F)   SpO2:            Electronically Signed By: Kaur Painting MD  July 21, 2018  12:31 PM

## 2018-07-21 NOTE — PROGRESS NOTES
Surgery staff  DOS: 7/21/2018    Patient seen postoperatively. He is alert and comfortable and denies respiratory difficulty.    100.4  100s-110s  100s/60s  97% 3NC    Alert, appropriate  Pulse regular  Breathing comfortably  CTs without air leak    Comfortable s/p left thoracotomy, repair of esophageal perforation, right chest tube, esophageal stent, PEG.  - Postoperative pain: Hydromorphone PCA.  - Esophageal perforation: Continue cares per thoracic plan. On pip/tazo, vanco, fluconazole.  - LMWH, PPI for prophylaxis.   - Continue cares.    Luigi Rush MD, PhD  Surgical critical care  July 21, 2018, 4:43 PM

## 2018-07-21 NOTE — BRIEF OP NOTE
[facility], [place]    Brief Operative Note    Pre-operative diagnosis: ESOPHAGEAL PERFORATION  Post-operative diagnosis * No post-op diagnosis entered *  Procedure: Procedure(s):  Esophagogastroduodenoscopy, Percutaneous Gastrostomy tube placement, ENDOSCOPIC INSERTION TUBE GASTROSTOMY-Jejunostomy placement, Right chest tube placement, Left thoracotomy Repair, Esophageal Stent placement with C-Arm, Flexible Bronchoscopy - Wound Class: III-Contaminated  ENDOSCOPIC INSERTION TUBE JEJUNOSTOMY  - Wound Class: I-Clean  LEFT THORACOTOMY TO REPAIRE ESOHAGEAL PERFORATION - Wound Class: II-Clean Contaminated  Surgeon: Surgeon(s) and Role:     * Dennis Miranda MD - Primary     * Moshe Fowler MD - Assisting  Anesthesia: General   Estimated blood loss: 100 mL  Drains: 28 Fr left chest tubes x 2  Specimens: * No specimens in log *  Findings: distal esophageal perforation just proximal to Z line. Primary repair in 2 layers with intercostal muscle flap coverage.  Complications: None.  Implants: alimaxx 16 mm esophageal stent, .

## 2018-07-21 NOTE — ANESTHESIA PREPROCEDURE EVALUATION
Anesthesia Evaluation     .             ROS/MED HX    ENT/Pulmonary:  - neg pulmonary ROS     Neurologic:  - neg neurologic ROS     Cardiovascular:     (+) Dyslipidemia, ----. : . . . :. .       METS/Exercise Tolerance:     Hematologic:  - neg hematologic  ROS       Musculoskeletal:  - neg musculoskeletal ROS       GI/Hepatic:  - neg GI/hepatic ROS       Renal/Genitourinary:  - ROS Renal section negative       Endo:  - neg endo ROS       Psychiatric:  - neg psychiatric ROS       Infectious Disease:  - neg infectious disease ROS       Malignancy:      - no malignancy   Other:    - neg other ROS                 Physical Exam  Normal systems: cardiovascular and dental    Airway   Mallampati: II  TM distance: >3 FB  Neck ROM: full    Dental     Cardiovascular   Rhythm and rate: regular and normal      Pulmonary    breath sounds clear to auscultation      Labs:  BMP:  Recent Labs   Lab Test  07/21/18 0130   NA  140   POTASSIUM  4.3   CHLORIDE  110*   CO2  22   BUN  25   CR  0.96   GLC  162*   ALISON  8.2*     LFTs:   No results for input(s): PROTTOTAL, ALBUMIN, BILITOTAL, ALKPHOS, AST, ALT, BILIDIRECT in the last 43977 hours.  CBC:   Recent Labs   Lab Test  07/21/18 0130   WBC  14.4*   RBC  4.84   HGB  14.3   HCT  41.4   MCV  86   MCH  29.5   MCHC  34.5   RDW  13.0   PLT  199     Coags:  Recent Labs   Lab Test  07/21/18 0130   INR  1.08                 Anesthesia Plan      History & Physical Review  History and physical reviewed and following examination; no interval change.    ASA Status:  2 .    NPO Status:  > 8 hours    Plan for General with Intravenous induction. Maintenance will be Balanced.    PONV prophylaxis:  Ondansetron (or other 5HT-3)  Additional equipment: Videolaryngoscope, Arterial Line, 2nd IV and Double Lumen ETT Plan: Gen with double lumen (Left) and arterial line placement      Postoperative Care  Postoperative pain management:  IV analgesics.      Consents  Anesthetic plan, risks, benefits and  alternatives discussed with:  Patient..

## 2018-07-21 NOTE — OR NURSING
Received from OR grunting respirations and restless, confused, obtunded with low sats. Lung sounds auscultated bilaterally equally though coarse anteriorly. Given Fentanyl IV by Dr. Painting, and sats improved.

## 2018-07-21 NOTE — H&P
Thoracic Surgery H&P    Staff: Dr. Miarnda  Date: 7/21/2018   CC: esophageal perforation    Assessment/Plan:  37 year old male who is otherwise who presents for an esophageal perforation that occurred about at 1345 yesterday. He is hemodynamically stable but does have a significant amount of pain. CT demonstrates a significant perforation. Plan for OR with Dr. Miranda for EGD, gastrostomy, jejunostomy, chest tube placement and right thoracotomy with repair vs resection   - NPO  - Pre op orders placed  - 2 units RBCs available for OR  - vanc, zosyn, fluc    Discussed with Dr. Fowler, thoracic fellow    Marjorie Steen MD  General Surgery, PGY-3  Pg [phone]    ------------------------------------------  HPI:   Mr. Constantino is a 38 yo M who is otherwise healthy who was eating a hand full of popcorn around 1345 on Friday 7/20. He had difficulty swallowing the popcorn and developed sudden, severe epigastric pain. He also spit up some blood just once following this episode. He became diaphoretic and felt as if he was going to pass out. Patient reports he intermittently has difficulties swallowing food but he attributes this to not chewing his food properly. He experienced one episode 7-8 years ago where he had significant pain after swallowing a pizza roll up. He reports imaging studies were done where he swallowed contrast and did not demonstrate any abnormalities. He was found to have pneumomediastinum after a CT and MRI of the abdomen at [place]. He transferred to [facility] and eventually transferred to [facility] for definitive care.   ?  Review of Systems:  ROS: 10 point ROS neg other than the symptoms noted above in the HPI.    PMH:  Depression, dyslipidemia, epistaxis    PSHx:  wisdom teeth, knee arthroscopy, shoulder surgery    Medications:  Caffeine  Multivitamin    Allergies:   Naproxen and Sulfa drugs     SocHx:  Denies alcohol, tobacco and illicit drug use but tested positive on utox at outside hospital for meth  "and bnezos    FamHx:  Negative for bleeding disorders, clotting disorders, or problems with anesthesia    Physical Examination   /81  Temp 99.5  F (37.5  C) (Oral)  Resp 21  Ht 1.676 m (5' 6\")  Wt 86.2 kg (190 lb)  SpO2 98%  BMI 30.67 kg/m2  General: awake, alert, NAD, poor dentition  CV: RRR  Pulm: Non-labored breathing, no crepitus  Abd: Soft, TTP in the epigastrium, no peritoneal signs, no masses or scars  Extremities: wwp    Labs: Reviewed   K 4.3  Cr 0.96  WBC 14.4  Hgb 14.3  Plt 199  INR 1.08    Imaging: Reviewed  CT chest with left pleural effusion and significant distal esophageal perforation    STAFF ADDENDUM:  I saw and evaluated Mr. Constantino and agree with the resident s findings and plan of care as documented in the resident s note and edited by me, as applicable.      In summary, Mr. Constantino has an esophageal perforation with a mediastinal abscess. We plan to get him ready for esophageal repair in OR, possible esophageal exclusion (although unlikely).  I spent a total of 40 minutes with Mr. Constantino, 35 of which were spent in counseling and coordination of care. The patient had all questions answered and was in agreement with the plan.  Dennis Miranda MD      "

## 2018-07-21 NOTE — PROGRESS NOTES
SURGICAL ICU PROGRESS NOTE  07/21/2018      ASSESSMENT: Mr Constantino is a 36 yo M who is otherwise who presents for an esophageal perforation that occurred about 12 hours ago. He is hemodynamically stable but does have a significant amount of pain. Will obtain CT of chest to evaluate perforation, free vs contained.     Plan/interval progress  -SW, drug screen positive for meth and benzos  -Touch base with thoracic regarding diet and abx duration  -[place] out    PLAN:   Neuro/ pain/ sedation:  - Monitor neurological status. Notify the MD for any acute changes in exam.  - PCA, will transition to PO meds     Pulmonary care:   -Supplemental oxygen to keep saturation above 92 %.  -IS       Cardiovascular:    - Monitor hemodynamic status.      GI care:   - Strict NPO  - PEG tube to gravity  - PPI      Fluids/ Electrolytes/ Nutrition:   - D5 1/2NS at 75cc/hr for IV fluid hydration  - ICU electrolyte replacement protocol     Renal/ Fluid Balance:    - Will monitor intake and output.  - Patient voiding spontaneously      Endocrine:    - SSI      ID/ Antibiotics:  - Zoysn, vanc, fluconazole for esophageal perforation      Heme:     - CBC now      Prophylaxis:    - Mechanical prophylaxis for DVT.  - No chemical DVT prophylaxis due to potential surgical intervention      Lines/ tubes/ drains:  - PIV  - R and L chest tubes to water seal      Disposition:  - Surgical ICU.    ====================================    SUBJECTIVE:   No acute events overnight. Had episode of chest pain and SOB this AM. Improved with chest tube triage.    OBJECTIVE:   1. VITAL SIGNS:   Temp:  [99  F (37.2  C)-99.5  F (37.5  C)] 99  F (37.2  C)  Heart Rate:  [] 104  Resp:  [21-38] 32  BP: (121-133)/(67-85) 123/82  SpO2:  [95 %-98 %] 96 %  Resp: 32     PHYSICAL EXAMINATION:   General: Resting comfortably in bed  Neuro: A&Ox3, NAD  Resp: Mildly labored breathing on NC  CV: RRR  Abdomen: Soft, Non-distended, mildly tender to palpation  Incisions: clean,  dry, intact  PEG tube with bilious output  Chest tubes with S-S output and no air leak noted  Extremities: warm and well perfused    4. INVESTIGATIONS:   Arterial Blood Gases   No lab results found in last 7 days.  Complete Blood Count     Recent Labs  Lab 07/21/18 0130   WBC 14.4*   HGB 14.3        Basic Metabolic Panel    Recent Labs  Lab 07/21/18 0130      POTASSIUM 4.3   CHLORIDE 110*   CO2 22   BUN 25   CR 0.96   *     Liver Function Tests    Recent Labs  Lab 07/21/18 0130   INR 1.08     Pancreatic Enzymes  No lab results found in last 7 days.  Coagulation Profile    Recent Labs  Lab 07/21/18 0130   INR 1.08     Lactate  Invalid input(s): LACTATE    5. RADIOLOGY:   No results found for this or any previous visit (from the past 24 hour(s)).    =========================================    Patient seen, findings and plan discussed with surgical ICU staff Dr. Rush.  - - - - - - - - - - - - - - - - - -    Ulysses Cerda MD  PGY-2 Surgery  pager [phone]        See [facility] for on-call pager information.

## 2018-07-21 NOTE — IP AVS SNAPSHOT
"    [address] [facility]: [phone]                                              INTERAGENCY TRANSFER FORM - PHYSICIAN ORDERS   2018                    Hospital Admission Date: 2018  CHRISTIANA BUSTAMANTE   : 1980  Sex: Male        Attending Provider: Dennis Miranda MD     Allergies:  Naproxen, Sulfa Drugs    Infection:  None   Service:  THORACIC [place]    Ht:  1.676 m (5' 6\")   Wt:  79.4 kg (175 lb)   Admission Wt:  86.2 kg (190 lb)    BMI:  28.25 kg/m 2   BSA:  1.92 m 2            Patient PCP Information     Provider PCP Type    Physician No Ref-Primary General      ED Clinical Impression     Diagnosis Description Comment Added By Time Added    Esophageal perforation [K22.3] Esophageal perforation [K22.3]  Annelise Linton RN 2018  2:26 PM    Bowel habit changes [R19.4] Bowel habit changes [R19.4]  Chiqui Damon PA-C 2018  2:43 PM      Hospital Problems as of 2018              Priority Class Noted POA    Esophageal perforation Medium  2018 Yes      Non-Hospital Problems as of 2018     None      Code Status History     Date Active Date Inactive Code Status Order ID Comments User Context    This patient has a current code status but no historical code status.         Medication Review      START taking        Dose / Directions Comments    acetaminophen 32 mg/mL solution   Commonly known as:  TYLENOL        Dose:  650 mg   20.3 mLs (650 mg) by Per J Tube route every 4 hours   Quantity:  400 mL   Refills:  1        ondansetron 4 MG ODT tab   Commonly known as:  ZOFRAN-ODT        Dose:  4 mg   Take 1 tablet (4 mg) by mouth every 6 hours as needed for nausea or vomiting   Quantity:  120 tablet   Refills:  0        oxyCODONE 5 MG/5ML solution   Commonly known as:  ROXICODONE        Dose:  5-10 mg   5-10 mLs (5-10 mg) by Per J Tube route every 4 hours as needed for moderate to severe pain   Quantity:  350 mL   Refills:  0        pantoprazole 2 mg/mL Susp " suspension   Commonly known as:  PROTONIX        Dose:  40 mg   20 mLs (40 mg) by Per Feeding Tube route daily   Quantity:  100 mL   Refills:  1        prochlorperazine 10 MG tablet   Commonly known as:  COMPAZINE        Dose:  10 mg   Take 1 tablet (10 mg) by mouth every 6 hours as needed for nausea or vomiting   Quantity:  90 tablet   Refills:  0        senna-docusate 8.6-50 MG per tablet   Commonly known as:  SENOKOT-S;PERICOLACE   Used for:  Bowel habit changes        Dose:  2 tablet   Take 2 tablets by mouth 2 times daily   Quantity:  120 tablet   Refills:  1          CONTINUE these medications which have NOT CHANGED        Dose / Directions Comments    BRONKAID PO        Dose:  12.524 mg   Take 12.524 mg by mouth as needed (for phlegm)   Refills:  0        MULTIvitamin  S Caps        Dose:  1 capsule   Take 1 capsule by mouth daily   Refills:  0          STOP taking     caffeine 200 MG Tabs tablet   Commonly known as:  NO-DOZE                   After Care     Adult Formula Drip Feeding       Isosource 1.5 @ goal 110 ml/hr x 12 hours       Discharge Instructions       THORACIC SURGERY DISCHARGE INSTRUCTIONS    DIET: Full liquids, advance as tolerated    If your plans upon discharge include prolonged periods of sitting (i.e a lengthy car or plane ride), it is highly beneficial to get up and walk at least once per hour to help prevent swelling and blood clots.     You may remove chest tube dressing 48 hours after tube removal and bandage the site at your own discretion thereafter.  Small amounts of leakage are normal for 2-3 days after removal.  Feel free to call with questions.    You may get incision wet 2 days after operation. Do not submerge, soak, or scrub incision or swim until seen in follow-up.    Take incentive spirometer home for continued frequent use    Activity as tolerated, no strenous activity until seen in follow-up, no lifting greater than 20 pounds for the next 2 weeks.    Stay hydrated. Take  "over the counter fiber (metamucil or benefiber) and stool softeners (Miralax, docusate or senna) if becoming constipated.     Call for fever greater than 101.5, chills, increased size of incision, red skin around incision, vision changes, muscle strength changes, sensation changes, shortness of breath, or other concerns.    No driving while taking narcotic pain medication.    Transition to ibuprofen or tylenol/acetaminophen for pain control. Do not take tylenol/acetaminophen and acetaminophen containing narcotic (e.g., percocet or vicodin) at the same time. If you have known ulcer problems, or kidney trouble (elevated creatinine) do not take the ibuprofen.    In emergencies, call 911    For other Questions or Concerns;   A.) During weekday working hours (Monday through Friday 8am to 4:30pm)   call [phone] and ask to speak to a clinical nurse specialist.     B.) At nights (after 4:30pm), on weekends, or if urgent call [phone] and   tell the  \"I would like to page job code [phone], the thoracic surgery   fellow on call, please.\"       Tubes       PEG TUBE INSTRUCTIONS: G tube portion    Venting:  -You should vent or temporarily put your tube to gravity bag (or basin) drainage if you experience nausea or bloating.  This is important as it will help to protect the hernia repair.   If you are uncertain how to do this, please ask your nurse for instruction.    Skin care:  -Change the gauze dressing around your PEG tube daily.  -Check for redness and swelling in the area where the tube goes into your skin.   -Keep the skin around your tube dry and with a split gauze under the flange. If the hole where the tube enters your body is draining fluid, you may need to put barrier cream or antibiotic cream on the skin around your tube after you are done cleaning it. Cover the area with bandages, and call your caregiver.   -If there are no stitches holding your PEG tube in place on your skin, gently turn the " tube. This may decrease pressure on your skin, and help prevent an infection. Ask your caregiver if you should turn your PEG tube, and how to do it correctly.     Flushes:  -Flush your feeding tube with 30cc of water twice daily to ensure it does not become plugged  -If administering medications or tube feedings via your tube, make sure to flush with water immediately after use to prevent the tube from plugging       Tubes       J TUBE INSTRUCTIONS: for tube feeds    Flush your feeding tube with 30cc of water;  1. After medication administration through the feeding tube  2. Before and after tube feeds  3. Every 8 hours while awake if you have not used the tube during that time             Referrals     Home infusion referral       Your provider has referred you [place]: [facility] [place] [facility] [place] [phone]   [email or web address]    Local Address (if different from home address): N/A    Anticipated Length of Therapy: Per MD Order  Isosource 1.5 via J tube from 8p-8a at 110ml/hr    Home Infusion Pharmacist to adjust therapy based on labs and clinical assessments: Yes    Agency Staff to assess nursing needs for Infusion Therapy.             Your next 10 appointments already scheduled     Aug 15, 2018   Procedure with Allyn Copeland MD   [facility], [place], Same Day Surgery (--)    [address]  [facility] [address] [phone]              Follow-Up Appointment Instructions     Future Labs/Procedures    Follow Up and recommended labs and tests     Comments:    1.) Follow up with primary care physician, No Ref-Primary, Physician, in 1-2 weeks.  2.) Follow up with a thoracic surgery Clinical Nurse Specialist in Thoracic Surgery clinic in 2 weeks in the OR with Dr Copeland with EGD and possible stent replacement.      Follow-Up Appointment Instructions     Follow Up and recommended labs and tests       1.) Follow up with primary care physician, No Ref-Primary, Physician, in 1-2  weeks.  2.) Follow up with a thoracic surgery Clinical Nurse Specialist in Thoracic Surgery clinic in 2 weeks in the OR with Dr Copeland with EGD and possible stent replacement.             Statement of Approval     Ordered          08/02/18 1155  I have reviewed and agree with all the recommendations and orders detailed in this document.  EFFECTIVE NOW     Approved and electronically signed by:  Kuldeep Lundberg PA-C

## 2018-07-21 NOTE — PLAN OF CARE
Problem: Patient Care Overview  Goal: Plan of Care/Patient Progress Review  Outcome: Declining  S: Increased pain and difficulty breathing. MD notified. See ct results. Thoracic at bedside for OR consent.  B: Esoph perf.  A: Declining.  R: Prepped for OR. Expected go soon. Monitor, pain med as needed.

## 2018-07-21 NOTE — PROGRESS NOTES
Pt arrived from PACU approximately 14:30. Pt is AOx4, PERRLA. Pt reports pain is tolerable in mediastinum with movement. No pain with no movement. Using PCA button appropriately. C/O double vision and reports the double vision was present prior to surgery but not prior to admission. States eyes are dry. Capnography. Chest tube x3; right side and left side x2 with serosang. Rosenberg in placed.   Parents notified and updated.

## 2018-07-21 NOTE — OP NOTE
Procedure Date: 07/21/2018      PREOPERATIVE DIAGNOSIS:  Esophageal perforation.      PROCEDURES PERFORMED:   1.  Esophagogastroduodenoscopy.   2.  Percutaneous endoscopic gastrostomy tube placement.   3.  Gastrojejunostomy feeding tube placement.   4.  Fluoroscopy with interpretation of images.   5.  Right tube thoracostomy.   6.  Left thoracotomy with primary repair of esophageal perforation and intercostal muscle flap reinforcement.   7.  Esophageal stent placement (16 x 120 mm, fully covered).   8.  Flexible bronchoscopy.      SURGEON:  Dennis Miranda MD (present for the entire procedure).      RESIDENT SURGEON:  Moshe Fowler MD      ANESTHESIA:  General.      ESTIMATED BLOOD LOSS:  100 mL.      COMPLICATIONS:  None immediate.      FINDINGS:  There was about 1.5-2 cm long perforation just above the Z-line with periesophageal debris.  Additionally, the endoscopic appearance of the esophagus was that of eosinophilic esophagitis with a couple of separate just superficial mucosal tears identified in the esophagus at about 30 cm.  The esophagus had a ring-like appearance.      DESCRIPTION OF PROCEDURE:  With the patient in supine position under general anesthesia, we performed an esophagogastroscopy and I could see the area of the perforation, but it was not free into the pleural space.  We then placed a percutaneous gastrostomy tube placement by placing a needle into the gastric lumen passing a wire and pulling out the wire with the gastroscope and a polypectomy snare out through the mouth.  I looped it to the 20-Bengali PEG tube and then we pulled the PEG tube back out through the abdominal wall and held it in place.  We then made 3 small incisions around the PEG tube and using a Los-Zoila stitch placed 3 stitches with Vicryl sutures to hold the stomach in place around the G-tube site.  We then placed a wire through the G-tube and removed the G-tube while leaving the wire in place and placed a 26 peel-away  [place] sheath over the wire with the tip into the duodenum through the pylorus.        We then advanced the wire, but we had some difficulty advancing it so then we removed the wire and the introducer for the peel-away and through the peel-away sheath placed a thin adult gastroscope and then entered the duodenum without difficulty. We then navigated down the duodenum to the ligament of Treitz, passed a wire and removed the thin gastroscope.  We then placed a 22-[place] GJ tube over the wire through the peel-away sheath with the tip into the distal duodenum, insufflated the balloon and verified the position with contrast.  We then secured the tube.  For the final positioning of the GJ tube, we used fluoroscopy.      After this, the right chest was prepared and draped in conventional fashion.  We made a 2 cm anterolateral incision, entered the right pleural space and placed a 24-[place] chest tube.  After this, the patient was turned in the right lateral decubitus and we allowed the left lung to be collapsed via the double-lumen tube.  We then made a seventh intercostal space thoracotomy and mobilized intercostal muscle bundle to use it as a flap after the repair.  We then mobilized the inferior pulmonary ligament and could see the area of abscess in the mediastinum, but it had not communicated with the pleural space.  We opened the mediastinal pleura and got around the esophagus with a Penrose and then got rid of all the food debris in the area surrounding the esophagus and then we could see the tear which was just above the Z-line and was about 2 cm length.  We debrided the edges and closed in 2 layers.  We closed the mucosa with interrupted 3-0 Vicryls and the muscle with a running 3-0 Vicryl.        We then performed an endoscopy and under insufflation, there was no leak.  We passed a wire and then again under fluoroscopic guidance, placed a 16 x 120 mm esophageal stent.  We then secured it in place with a #2 Vicryl  stitch going through the esophagus and stent and tying it snug but not too tight to minimize the chance of migration from the stent.  Finally, we placed the intercostal muscle over our repair and tacked it to the esophagus with 2 Vicryl stitches.  We irrigated copiously and placed a 28-Azerbaijani right angle and 28-Azerbaijani straight tube, insufflated the lung and then reapproximated the ribs with pericostal stitches.  We closed the soft tissues with absorbable sutures in the conventional fashion.  At the end, we performed a flexible bronchoscopy to clear secretions.  The patient tolerated the procedure well.         ORLANDO SHEPHERD MD             D: 2018   T: 2018   MT: ARIAS      Name:     CHRISTIANA BUSTAMANTE   MRN:      [id number]        Account:        [id number]   :      1980           Procedure Date: 2018      Document: [id number]       cc: [facility] Billing

## 2018-07-21 NOTE — IP AVS SNAPSHOT
MRN:[id number]                      After Visit Summary   7/21/2018    Tate Constantino    MRN: [id number]           Thank you!     Thank you for choosing [facility] for your care. Our goal is always to provide you with excellent care. Hearing back from our patients is one way we can continue to improve our services. Please take a few minutes to complete the written survey that you may receive in the mail after you visit with us. Thank you!        Patient Information     Date Of Birth          1980        Designated Caregiver       Most Recent Value    Caregiver    Will someone help with your care after discharge? yes    Name of designated caregiver Freya Constantino    Phone number of caregiver [phone]    Caregiver address [address] [place] [address]      About your hospital stay     You were admitted on:  July 21, 2018 You last received care in the:  [address] [facility]    You were discharged on:  August 2, 2018       Who to Call     For medical emergencies, please call 911.  For non-urgent questions about your medical care, please call your primary care provider or clinic, None  For questions related to your surgery, please call your surgery clinic        Attending Provider     Provider Dennis Matamoros MD Thoracic Diseases       Primary Care Provider Fax #    Physician No Ref-Primary [phone]      After Care Instructions     Adult Formula Drip Feeding       Isosource 1.5 @ goal 110 ml/hr x 12 hours            Discharge Instructions       THORACIC SURGERY DISCHARGE INSTRUCTIONS    DIET: Full liquids, advance as tolerated    If your plans upon discharge include prolonged periods of sitting (i.e a lengthy car or plane ride), it is highly beneficial to get up and walk at least once per hour to help prevent swelling and blood clots.     You may remove chest tube dressing 48 hours after tube removal and bandage the site at your own discretion  "thereafter.  Small amounts of leakage are normal for 2-3 days after removal.  Feel free to call with questions.    You may get incision wet 2 days after operation. Do not submerge, soak, or scrub incision or swim until seen in follow-up.    Take incentive spirometer home for continued frequent use    Activity as tolerated, no strenous activity until seen in follow-up, no lifting greater than 20 pounds for the next 2 weeks.    Stay hydrated. Take over the counter fiber (metamucil or benefiber) and stool softeners (Miralax, docusate or senna) if becoming constipated.     Call for fever greater than 101.5, chills, increased size of incision, red skin around incision, vision changes, muscle strength changes, sensation changes, shortness of breath, or other concerns.    No driving while taking narcotic pain medication.    Transition to ibuprofen or tylenol/acetaminophen for pain control. Do not take tylenol/acetaminophen and acetaminophen containing narcotic (e.g., percocet or vicodin) at the same time. If you have known ulcer problems, or kidney trouble (elevated creatinine) do not take the ibuprofen.    In emergencies, call 911    For other Questions or Concerns;   A.) During weekday working hours (Monday through Friday 8am to 4:30pm)   call [phone] and ask to speak to a clinical nurse specialist.     B.) At nights (after 4:30pm), on weekends, or if urgent call [phone] and   tell the  \"I would like to page job code [phone], the thoracic surgery   fellow on call, please.\"            Tubes       PEG TUBE INSTRUCTIONS: G tube portion    Venting:  -You should vent or temporarily put your tube to gravity bag (or basin) drainage if you experience nausea or bloating.  This is important as it will help to protect the hernia repair.   If you are uncertain how to do this, please ask your nurse for instruction.    Skin care:  -Change the gauze dressing around your PEG tube daily.  -Check for redness and " swelling in the area where the tube goes into your skin.   -Keep the skin around your tube dry and with a split gauze under the flange. If the hole where the tube enters your body is draining fluid, you may need to put barrier cream or antibiotic cream on the skin around your tube after you are done cleaning it. Cover the area with bandages, and call your caregiver.   -If there are no stitches holding your PEG tube in place on your skin, gently turn the tube. This may decrease pressure on your skin, and help prevent an infection. Ask your caregiver if you should turn your PEG tube, and how to do it correctly.     Flushes:  -Flush your feeding tube with 30cc of water twice daily to ensure it does not become plugged  -If administering medications or tube feedings via your tube, make sure to flush with water immediately after use to prevent the tube from plugging            Tubes       J TUBE INSTRUCTIONS: for tube feeds    Flush your feeding tube with 30cc of water;  1. After medication administration through the feeding tube  2. Before and after tube feeds  3. Every 8 hours while awake if you have not used the tube during that time                  Follow-up Appointments     Follow Up and recommended labs and tests       1.) Follow up with primary care physician, No Ref-Primary, Physician, in 1-2 weeks.  2.) Follow up with a thoracic surgery Clinical Nurse Specialist in Thoracic Surgery clinic in 2 weeks in the OR with Dr Copeland with EGD and possible stent replacement.                  Your next 10 appointments already scheduled     Aug 15, 2018   Procedure with Allyn Copeland MD   [facility], [place], Same Day Surgery (--)    [address] [address] [phone]              Additional Services     Home infusion referral       Your provider has referred you to: DEVENDRA: [facility] [place] [phone]   [email or web address]    Local Address (if different from  "home address): N/A    Anticipated Length of Therapy: Per MD Order  Isosource 1.5 via J tube from 8p-8a at 110ml/hr    Home Infusion Pharmacist to adjust therapy based on labs and clinical assessments: Yes    Agency Staff to assess nursing needs for Infusion Therapy.                  Pending Results     No orders found from 2018 to 2018.            Statement of Approval     Ordered          18 1155  I have reviewed and agree with all the recommendations and orders detailed in this document.  EFFECTIVE NOW     Approved and electronically signed by:  Kuldeep Lundberg PA-C             Admission Information     Date & Time Provider Department Dept. Phone    2018 Dennis Miranda MD [address] [facility] [place] [phone]      Your Vitals Were     Blood Pressure Pulse Temperature Respirations Height Weight    139/69 (BP Location: Right arm) 86 98.4  F (36.9  C) (Oral) 18 1.676 m (5' 6\") 79.4 kg (175 lb)    Pulse Oximetry BMI (Body Mass Index)                96% 28.25 kg/m2          [employer] Information     [employer] lets you send messages to your doctor, view your test results, renew your prescriptions, schedule appointments and more. To sign up, go to [email or web address] . Click on \"Log in\" on the left side of the screen, which will take you to the Welcome page. Then click on \"Sign up Now\" on the right side of the page.     You will be asked to enter the access code listed below, as well as some personal information. Please follow the directions to create your username and password.     Your access code is: [email or web address]  Expires: 10/30/2018  3:32 PM     Your access code will  in 90 days. If you need help or a new code, please call your [facility] clinic or [phone].        Care EveryWhere ID     This is your Care EveryWhere ID. This could be used by other organizations to access your [facility] medical records  [id number]        Equal Access to Services     TUTU GALLEGOS AH: Hadii tamera " beryl Reich, waaxda luqadaha, qaybta kaalmada adechauncey, nayeli humphreyin hayaarichi cejademarcus shreyasadriana la'nidiarichi marga. So [facility] [phone].    ATENCIÓN: Si habla andres, tiene a page disposición servicios gratuitos de asistencia lingüística. Llame al [phone].    We comply with applicable federal civil rights laws and [place] laws. We do not discriminate on the basis of race, color, national origin, age, disability, sex, sexual orientation, or gender identity.               Review of your medicines      START taking        Dose / Directions    acetaminophen 32 mg/mL solution   Commonly known as:  TYLENOL        Dose:  650 mg   20.3 mLs (650 mg) by Per J Tube route every 4 hours   Quantity:  400 mL   Refills:  1       ondansetron 4 MG ODT tab   Commonly known as:  ZOFRAN-ODT        Dose:  4 mg   Take 1 tablet (4 mg) by mouth every 6 hours as needed for nausea or vomiting   Quantity:  120 tablet   Refills:  0       oxyCODONE 5 MG/5ML solution   Commonly known as:  ROXICODONE        Dose:  5-10 mg   5-10 mLs (5-10 mg) by Per J Tube route every 4 hours as needed for moderate to severe pain   Quantity:  350 mL   Refills:  0       pantoprazole 2 mg/mL Susp suspension   Commonly known as:  PROTONIX        Dose:  40 mg   20 mLs (40 mg) by Per Feeding Tube route daily   Quantity:  100 mL   Refills:  1       prochlorperazine 10 MG tablet   Commonly known as:  COMPAZINE        Dose:  10 mg   Take 1 tablet (10 mg) by mouth every 6 hours as needed for nausea or vomiting   Quantity:  90 tablet   Refills:  0       senna-docusate 8.6-50 MG per tablet   Commonly known as:  SENOKOT-S;PERICOLACE   Used for:  Bowel habit changes        Dose:  2 tablet   Take 2 tablets by mouth 2 times daily   Quantity:  120 tablet   Refills:  1         CONTINUE these medicines which have NOT CHANGED        Dose / Directions    BRONKAID PO        Dose:  12.524 mg   Take 12.524 mg by mouth as needed (for phlegm)   Refills:  0       MULTIvitamin  S Caps         Dose:  1 capsule   Take 1 capsule by mouth daily   Refills:  0         STOP taking     caffeine 200 MG Tabs tablet   Commonly known as:  NO-DOZE                Where to get your medicines      These medications were sent to [facility] - [place], [place] - [address]  [address] [address]     Phone:  [phone]     acetaminophen 32 mg/mL solution    ondansetron 4 MG ODT tab    pantoprazole 2 mg/mL Susp suspension    prochlorperazine 10 MG tablet    senna-docusate 8.6-50 MG per tablet         Some of these will need a paper prescription and others can be bought over the counter. Ask your nurse if you have questions.     Bring a paper prescription for each of these medications     oxyCODONE 5 MG/5ML solution                Protect others around you: Learn how to safely use, store and throw away your medicines at www.disposemymeds.org.        Information about OPIOIDS     PRESCRIPTION OPIOIDS: WHAT YOU NEED TO KNOW   We gave you an opioid (narcotic) pain medicine. It is important to manage your pain, but opioids are not always the best choice. You should first try all the other options your care team gave you. Take this medicine for as short a time (and as few doses) as possible.     These medicines have risks:    DO NOT drive when on new or higher doses of pain medicine. These medicines can affect your alertness and reaction times, and you could be arrested for driving under the influence (DUI). If you need to use opioids long-term, talk to your care team about driving.    DO NOT operate heave machinery    DO NOT do any other dangerous activities while taking these medicines.     DO NOT drink any alcohol while taking these medicines.      If the opioid prescribed includes acetaminophen, DO NOT take with any other medicines that contain acetaminophen. Read all labels carefully. Look for the word  acetaminophen  or  Tylenol.  Ask your pharmacist if you have questions or  are unsure.    You can get addicted to pain medicines, especially if you have a history of addiction (chemical, alcohol or substance dependence). Talk to your care team about ways to reduce this risk.    Store your pills in a secure place, locked if possible. We will not replace any lost or stolen medicine. If you don t finish your medicine, please throw away (dispose) as directed by your pharmacist. The [place] Pollution Control Agency has more information about safe disposal: https://www.pca.[facility].[place].us/living-green/managing-unwanted-medications.     All opioids tend to cause constipation. Drink plenty of water and eat foods that have a lot of fiber, such as fruits, vegetables, prune juice, apple juice and high-fiber cereal. Take a laxative (Miralax, milk of magnesia, Colace, Senna) if you don t move your bowels at least every other day.              Medication List: This is a list of all your medications and when to take them. Check marks below indicate your daily home schedule. Keep this list as a reference.      Medications           Morning Afternoon Evening Bedtime As Needed    acetaminophen 32 mg/mL solution   Commonly known as:  TYLENOL   20.3 mLs (650 mg) by Per J Tube route every 4 hours   Last time this was given:  650 mg on 8/2/2018  1:02 PM                                BRONKAID PO   Take 12.524 mg by mouth as needed (for phlegm)                                MULTIvitamin  S Caps   Take 1 capsule by mouth daily                                ondansetron 4 MG ODT tab   Commonly known as:  ZOFRAN-ODT   Take 1 tablet (4 mg) by mouth every 6 hours as needed for nausea or vomiting                                oxyCODONE 5 MG/5ML solution   Commonly known as:  ROXICODONE   5-10 mLs (5-10 mg) by Per J Tube route every 4 hours as needed for moderate to severe pain   Last time this was given:  10 mg on 8/2/2018  1:02 PM                                pantoprazole 2 mg/mL Susp suspension   Commonly known  as:  PROTONIX   20 mLs (40 mg) by Per Feeding Tube route daily   Last time this was given:  40 mg on 8/2/2018  8:29 AM                                prochlorperazine 10 MG tablet   Commonly known as:  COMPAZINE   Take 1 tablet (10 mg) by mouth every 6 hours as needed for nausea or vomiting                                senna-docusate 8.6-50 MG per tablet   Commonly known as:  SENOKOT-S;PERICOLACE   Take 2 tablets by mouth 2 times daily

## 2018-07-21 NOTE — ANESTHESIA CARE TRANSFER NOTE
Patient: Tate Constantino    Procedure(s):  Esophagogastroduodenoscopy, Percutaneous Gastrostomy tube placement, ENDOSCOPIC INSERTION TUBE GASTROSTOMY-Jejunostomy placement, Right chest tube placement, Left thoracotomy Repair, Esophageal Stent placement with C-Arm, Flexible Bronchoscopy - Wound Class: III-Contaminated  ENDOSCOPIC INSERTION TUBE JEJUNOSTOMY  - Wound Class: I-Clean  LEFT THORACOTOMY TO REPAIRE ESOHAGEAL PERFORATION - Wound Class: II-Clean Contaminated    Diagnosis: ESOPHAGEAL PERFORATION  Diagnosis Additional Information: No value filed.    Anesthesia Type:   General     Note:  Airway :Nasopharyngeal Airway and Face Mask  Patient transferred to:PACU  Handoff Report: Identifed the Patient, Identified the Reponsible Provider, Reviewed the pertinent medical history, Discussed the surgical course, Reviewed Intra-OP anesthesia mangement and issues during anesthesia, Set expectations for post-procedure period and Allowed opportunity for questions and acknowledgement of understanding      Vitals: (Last set prior to Anesthesia Care Transfer)    CRNA VITALS  7/21/2018 1105 - 7/21/2018 1142      7/21/2018             Pulse: 117    ART BP: 114/70    ART Mean: 90    SpO2: 92 %    Resp Rate (observed): 23                Electronically Signed By: Guillermina Cheek CRNA, APRN CRNA  July 21, 2018  11:42 AM

## 2018-07-21 NOTE — IP AVS SNAPSHOT
[address] [address]    Phone:  [phone]                                       After Visit Summary   7/21/2018    Tate Constantino    MRN: [id number]           After Visit Summary Signature Page     I have received my discharge instructions, and my questions have been answered. I have discussed any challenges I see with this plan with the nurse or doctor.    ..........................................................................................................................................  Patient/Patient Representative Signature      ..........................................................................................................................................  Patient Representative Print Name and Relationship to Patient    ..................................................               ................................................  Date                                            Time    ..........................................................................................................................................  Reviewed by Signature/Title    ...................................................              ..............................................  Date                                                            Time

## 2018-07-21 NOTE — ANESTHESIA PROCEDURE NOTES
Arterial Line Procedure Note  Staff:     Anesthesiologist:  ADENIKE AARON    Resident/CRNA:  SUSAN BENITEZ    Arterial line performed by resident/CRNA in presence of a teaching physician    Location: In OR After Induction  Procedure Start/Stop Times:     patient identified, IV checked, site marked, risks and benefits discussed, informed consent, monitors and equipment checked, pre-op evaluation and at physician/surgeon's request      Correct Patient: Yes      Correct Position: Yes      Correct Site: Yes      Correct Procedure: Yes      Correct Laterality:  Yes    Site Marked:  Yes  Line Placement:     Procedure:  Arterial Line    Insertion Site:  Radial    Insertion laterality:  Left    Skin Prep: Chloraprep      Patient Prep: patient draped, mask and sterile gloves      Local skin infiltration:  None    Ultrasound Guided?: Yes      Artery evaluated via ultrasound confirming patency.   Using realtime imaging, the artery was punctured and the needle was observed entering the artery.      A permanent image is NOT entered into the patient's record.      Catheter size:  20 gauge, 12 cm    Dressing:  Tegaderm    Complications:  None obvious    Arterial waveform: Yes      IBP within 10% of NIBP: Yes

## 2018-07-22 ENCOUNTER — APPOINTMENT (OUTPATIENT)
Dept: PHYSICAL THERAPY | Facility: CLINIC | Age: 38
End: 2018-07-22
Attending: THORACIC SURGERY (CARDIOTHORACIC VASCULAR SURGERY)
Payer: COMMERCIAL

## 2018-07-22 LAB
ALBUMIN SERPL-MCNC: 2.4 G/DL (ref 3.4–5)
ALP SERPL-CCNC: 37 U/L (ref 40–150)
ALT SERPL W P-5'-P-CCNC: 29 U/L (ref 0–70)
ANION GAP SERPL CALCULATED.3IONS-SCNC: 7 MMOL/L (ref 3–14)
AST SERPL W P-5'-P-CCNC: 32 U/L (ref 0–45)
BILIRUB SERPL-MCNC: 1 MG/DL (ref 0.2–1.3)
BUN SERPL-MCNC: 20 MG/DL (ref 7–30)
CALCIUM SERPL-MCNC: 8.3 MG/DL (ref 8.5–10.1)
CHLORIDE SERPL-SCNC: 106 MMOL/L (ref 94–109)
CO2 SERPL-SCNC: 24 MMOL/L (ref 20–32)
CREAT SERPL-MCNC: 0.97 MG/DL (ref 0.66–1.25)
ERYTHROCYTE [DISTWIDTH] IN BLOOD BY AUTOMATED COUNT: 13.7 % (ref 10–15)
GFR SERPL CREATININE-BSD FRML MDRD: 86 ML/MIN/1.7M2
GLUCOSE BLDC GLUCOMTR-MCNC: 112 MG/DL (ref 70–99)
GLUCOSE BLDC GLUCOMTR-MCNC: 113 MG/DL (ref 70–99)
GLUCOSE BLDC GLUCOMTR-MCNC: 129 MG/DL (ref 70–99)
GLUCOSE BLDC GLUCOMTR-MCNC: 162 MG/DL (ref 70–99)
GLUCOSE SERPL-MCNC: 103 MG/DL (ref 70–99)
HCT VFR BLD AUTO: 42.1 % (ref 40–53)
HGB BLD-MCNC: 14.3 G/DL (ref 13.3–17.7)
MCH RBC QN AUTO: 29.5 PG (ref 26.5–33)
MCHC RBC AUTO-ENTMCNC: 34 G/DL (ref 31.5–36.5)
MCV RBC AUTO: 87 FL (ref 78–100)
PLATELET # BLD AUTO: 193 10E9/L (ref 150–450)
POTASSIUM SERPL-SCNC: 4 MMOL/L (ref 3.4–5.3)
PROT SERPL-MCNC: 5.9 G/DL (ref 6.8–8.8)
RBC # BLD AUTO: 4.84 10E12/L (ref 4.4–5.9)
SODIUM SERPL-SCNC: 138 MMOL/L (ref 133–144)
WBC # BLD AUTO: 10.4 10E9/L (ref 4–11)

## 2018-07-22 PROCEDURE — 99231 SBSQ HOSP IP/OBS SF/LOW 25: CPT | Performed by: SURGERY

## 2018-07-22 PROCEDURE — 97161 PT EVAL LOW COMPLEX 20 MIN: CPT | Mod: GP

## 2018-07-22 PROCEDURE — 25000128 H RX IP 250 OP 636: Performed by: SURGERY

## 2018-07-22 PROCEDURE — 85027 COMPLETE CBC AUTOMATED: CPT | Performed by: THORACIC SURGERY (CARDIOTHORACIC VASCULAR SURGERY)

## 2018-07-22 PROCEDURE — 25000128 H RX IP 250 OP 636: Performed by: THORACIC SURGERY (CARDIOTHORACIC VASCULAR SURGERY)

## 2018-07-22 PROCEDURE — 80053 COMPREHEN METABOLIC PANEL: CPT | Performed by: THORACIC SURGERY (CARDIOTHORACIC VASCULAR SURGERY)

## 2018-07-22 PROCEDURE — 25000132 ZZH RX MED GY IP 250 OP 250 PS 637: Performed by: STUDENT IN AN ORGANIZED HEALTH CARE EDUCATION/TRAINING PROGRAM

## 2018-07-22 PROCEDURE — 00000146 ZZHCL STATISTIC GLUCOSE BY METER IP

## 2018-07-22 PROCEDURE — 25000132 ZZH RX MED GY IP 250 OP 250 PS 637: Performed by: THORACIC SURGERY (CARDIOTHORACIC VASCULAR SURGERY)

## 2018-07-22 PROCEDURE — 97530 THERAPEUTIC ACTIVITIES: CPT | Mod: GP

## 2018-07-22 PROCEDURE — 40000193 ZZH STATISTIC PT WARD VISIT

## 2018-07-22 PROCEDURE — 27210429 ZZH NUTRITION PRODUCT INTERMEDIATE LITER

## 2018-07-22 PROCEDURE — 25000125 ZZHC RX 250: Performed by: SURGERY

## 2018-07-22 PROCEDURE — 20000004 ZZH R&B ICU UMMC

## 2018-07-22 PROCEDURE — 25800025 ZZH RX 258: Performed by: THORACIC SURGERY (CARDIOTHORACIC VASCULAR SURGERY)

## 2018-07-22 PROCEDURE — 36415 COLL VENOUS BLD VENIPUNCTURE: CPT | Performed by: THORACIC SURGERY (CARDIOTHORACIC VASCULAR SURGERY)

## 2018-07-22 RX ORDER — OXYCODONE HCL 5 MG/5 ML
5-10 SOLUTION, ORAL ORAL EVERY 4 HOURS PRN
Status: DISCONTINUED | OUTPATIENT
Start: 2018-07-22 | End: 2018-08-02 | Stop reason: HOSPADM

## 2018-07-22 RX ORDER — HEPARIN SODIUM 5000 [USP'U]/.5ML
5000 INJECTION, SOLUTION INTRAVENOUS; SUBCUTANEOUS EVERY 8 HOURS
Status: DISCONTINUED | OUTPATIENT
Start: 2018-07-23 | End: 2018-07-25

## 2018-07-22 RX ORDER — SODIUM CHLORIDE 9 MG/ML
INJECTION, SOLUTION INTRAVENOUS
Status: DISCONTINUED
Start: 2018-07-22 | End: 2018-07-23 | Stop reason: HOSPADM

## 2018-07-22 RX ORDER — HYDROMORPHONE HCL/0.9% NACL/PF 0.2MG/0.2
0.2 SYRINGE (ML) INTRAVENOUS
Status: DISCONTINUED | OUTPATIENT
Start: 2018-07-22 | End: 2018-08-02 | Stop reason: HOSPADM

## 2018-07-22 RX ADMIN — MULTIVITAMIN 15 ML: LIQUID ORAL at 10:10

## 2018-07-22 RX ADMIN — VANCOMYCIN HYDROCHLORIDE 1750 MG: 10 INJECTION, POWDER, LYOPHILIZED, FOR SOLUTION INTRAVENOUS at 04:56

## 2018-07-22 RX ADMIN — PIPERACILLIN SODIUM AND TAZOBACTAM SODIUM 3.38 G: 3; .375 INJECTION, POWDER, LYOPHILIZED, FOR SOLUTION INTRAVENOUS at 21:59

## 2018-07-22 RX ADMIN — SODIUM CHLORIDE, POTASSIUM CHLORIDE, SODIUM LACTATE AND CALCIUM CHLORIDE: 600; 310; 30; 20 INJECTION, SOLUTION INTRAVENOUS at 02:13

## 2018-07-22 RX ADMIN — PIPERACILLIN SODIUM AND TAZOBACTAM SODIUM 3.38 G: 3; .375 INJECTION, POWDER, LYOPHILIZED, FOR SOLUTION INTRAVENOUS at 10:06

## 2018-07-22 RX ADMIN — VANCOMYCIN HYDROCHLORIDE 1750 MG: 10 INJECTION, POWDER, LYOPHILIZED, FOR SOLUTION INTRAVENOUS at 15:47

## 2018-07-22 RX ADMIN — PIPERACILLIN SODIUM AND TAZOBACTAM SODIUM 3.38 G: 3; .375 INJECTION, POWDER, LYOPHILIZED, FOR SOLUTION INTRAVENOUS at 06:49

## 2018-07-22 RX ADMIN — PIPERACILLIN SODIUM AND TAZOBACTAM SODIUM 3.38 G: 3; .375 INJECTION, POWDER, LYOPHILIZED, FOR SOLUTION INTRAVENOUS at 15:44

## 2018-07-22 RX ADMIN — MENTHOL 1 PATCH: 205.5 PATCH TOPICAL at 09:48

## 2018-07-22 RX ADMIN — SODIUM CHLORIDE, POTASSIUM CHLORIDE, SODIUM LACTATE AND CALCIUM CHLORIDE: 600; 310; 30; 20 INJECTION, SOLUTION INTRAVENOUS at 09:08

## 2018-07-22 RX ADMIN — PANTOPRAZOLE SODIUM 40 MG: 40 INJECTION, POWDER, FOR SOLUTION INTRAVENOUS at 08:48

## 2018-07-22 RX ADMIN — FLUCONAZOLE IN SODIUM CHLORIDE 400 MG: 2 INJECTION, SOLUTION INTRAVENOUS at 04:54

## 2018-07-22 RX ADMIN — ACETAMINOPHEN 650 MG: 325 SOLUTION ORAL at 10:10

## 2018-07-22 RX ADMIN — ENOXAPARIN SODIUM 40 MG: 40 INJECTION SUBCUTANEOUS at 08:48

## 2018-07-22 RX ADMIN — ACETAMINOPHEN 650 MG: 325 SOLUTION ORAL at 13:15

## 2018-07-22 RX ADMIN — OXYCODONE HYDROCHLORIDE 10 MG: 5 SOLUTION ORAL at 21:51

## 2018-07-22 RX ADMIN — ACETAMINOPHEN 650 MG: 325 SOLUTION ORAL at 21:51

## 2018-07-22 RX ADMIN — ACETAMINOPHEN 650 MG: 325 SOLUTION ORAL at 17:17

## 2018-07-22 RX ADMIN — DEXTROSE AND SODIUM CHLORIDE: 5; 450 INJECTION, SOLUTION INTRAVENOUS at 13:15

## 2018-07-22 ASSESSMENT — ACTIVITIES OF DAILY LIVING (ADL)
ADLS_ACUITY_SCORE: 9

## 2018-07-22 ASSESSMENT — PAIN DESCRIPTION - DESCRIPTORS
DESCRIPTORS: DISCOMFORT
DESCRIPTORS: CRUSHING;DISCOMFORT;PRESSURE
DESCRIPTORS: SHARP
DESCRIPTORS: DISCOMFORT
DESCRIPTORS: DISCOMFORT
DESCRIPTORS: SHARP

## 2018-07-22 NOTE — PROGRESS NOTES
07/22/18 1400   Quick Adds   Type of Visit Initial PT Evaluation  (Luciana Matute, PT, DPT )       Present no   Language english    Living Environment   Lives With parent(s)   Living Arrangements house  (single story )   Home Accessibility no concerns;stairs to enter home   Number of Stairs to Enter Home 2   Transportation Available car   Living Environment Comment Pt lives with parents in single story home. has 2 BERNARDO. no concerns related to home set-up   Self-Care   Dominant Hand right   Usual Activity Tolerance excellent   Current Activity Tolerance moderate   Regular Exercise yes   Activity/Exercise Type strength training   Exercise Amount/Frequency daily   Equipment Currently Used at Home none   Activity/Exercise/Self-Care Comment Pt indep with ADLs. Works FT (12 hr shifts in [employer]). Pt lifts weights almost daily at [employer].   Functional Level Prior   Ambulation 0-->independent   Transferring 0-->independent   Toileting 0-->independent   Bathing 0-->independent   Dressing 0-->independent   Eating 0-->independent   Communication 0-->understands/communicates without difficulty   Swallowing 2-->difficulty swallowing foods   Cognition 0 - no cognition issues reported   Fall history within last six months no   Which of the above functional risks had a recent onset or change? ambulation;transferring   Prior Functional Level Comment Pt indep with mobility prior to admit    General Information   Onset of Illness/Injury or Date of Surgery - Date 07/21/18   Referring Physician Moshe Fowler MD   Patient/Family Goals Statement dec pain   Pertinent History of Current Problem (include personal factors and/or comorbidities that impact the POC) 37 year old male with esophageal perforation s/p emergent EGD, PEGJ placement, esophageal stent, left thoracotomy with bilateral chest tubes.   Precautions/Limitations fall precautions;abdominal precautions   General Info Comments activity: ambulate with  A    Cognitive Status Examination   Orientation orientation to person, place and time   Level of Consciousness alert   Follows Commands and Answers Questions 100% of the time   Personal Safety and Judgment intact   Memory intact   Pain Assessment   Patient Currently in Pain Yes, see Vital Sign flowsheet   Integumentary/Edema   Integumentary/Edema no deficits were identifed   Posture    Posture Not impaired   Posture Comments fwd flexed dt abdominal pain in standing   Range of Motion (ROM)   ROM Comment WFL throughout   Strength   Strength Comments 5/5 UE and LE   Bed Mobility   Bed Mobility Comments requires HOB to be elevated as he does not tolerate flat bed 2/2 pain. Walks LE to EOB and reaches across to writers hand hold for UE support. Scoots to EOB   Transfer Skills   Transfer Comments STS with min A and HH on writers forearms   Gait   Gait Comments not assessed today   Balance   Balance Comments not formally assessed, no overt LOB   Sensory Examination   Sensory Perception no deficits were identified   Coordination   Coordination no deficits were identified   Muscle Tone   Muscle Tone no deficits were identified   General Therapy Interventions   Planned Therapy Interventions balance training;bed mobility training;gait training;strengthening;transfer training;progressive activity/exercise;home program guidelines   Clinical Impression   Criteria for Skilled Therapeutic Intervention yes, treatment indicated   PT Diagnosis dec functional mobility   Influenced by the following impairments pain, post op fatigue   Functional limitations due to impairments bed mobility, transfers, gait, stairs, endurance, balance   Clinical Presentation Stable/Uncomplicated   Clinical Presentation Rationale PMH, clinical judgement, current mobility   Clinical Decision Making (Complexity) Low complexity   Therapy Frequency` daily   Predicted Duration of Therapy Intervention (days/wks) 7/29/18   Anticipated Equipment Needs at Discharge  "(none)   Anticipated Discharge Disposition Home   Risk & Benefits of therapy have been explained Yes   Patient, Family & other staff in agreement with plan of care Yes   Clinical Impression Comments PT eval completed, tx indicated    [facility] TM \"6 Clicks\"   2016, Trustees of [facility], under license to [employer].  All rights reserved.   6 Clicks Short Forms Basic Mobility Inpatient Short Form   [facility] AM-PAC  \"6 Clicks\" V.2 Basic Mobility Inpatient Short Form   1. Turning from your back to your side while in a flat bed without using bedrails? 2 - A Lot   2. Moving from lying on your back to sitting on the side of a flat bed without using bedrails? 2 - A Lot   3. Moving to and from a bed to a chair (including a wheelchair)? 3 - A Little   4. Standing up from a chair using your arms (e.g., wheelchair, or bedside chair)? 3 - A Little   5. To walk in hospital room? 3 - A Little   6. Climbing 3-5 steps with a railing? 2 - A Lot   Basic Mobility Raw Score (Score out of 24.Lower scores equate to lower levels of function) 15   Total Evaluation Time   Total Evaluation Time (Minutes) 7     "

## 2018-07-22 NOTE — PLAN OF CARE
Problem: Patient Care Overview  Goal: Plan of Care/Patient Progress Review  OT-4AB: Cancel/Defer. Per discussion with PT, Pt has no acute OT needs at this time. Will complete OT orders.

## 2018-07-22 NOTE — PHARMACY-CONSULT NOTE
Pharmacy Tube Feeding Consult    Medication reviewed for administration by feeding tube and for potential food/drug interactions.    Recommendation: No changes are needed at this time.     Pharmacy will continue to follow as new medications are ordered.    Nohemy Zapata, PharmD  July 22, 2018

## 2018-07-22 NOTE — PROGRESS NOTES
"CLINICAL NUTRITION SERVICES - ASSESSMENT NOTE     Nutrition Prescription    RECOMMENDATIONS FOR MDs/PROVIDERS TO ORDER:  Consider referral for pt to see RD as outpatient once he is able to start swallowing food and liquids again. Note pt has h/o high TG and some BG >126 mg/dL which could indicate pre-diabetes.    Malnutrition Status:    Pt does not meet two of the criteria necessary for diagnosing malnutrition but is at risk for malnutrition    Recommendations already ordered by Registered Dietitian (RD):  Ordered Isosource 1.5 @ 55 ml/hr (1320 ml/day) to provide 1980 kcals (28 kcal/kg/day), 90 g PRO (1.3 g/kg/day), 1003 mL H2O, 232 g CHO and 20 g Fiber daily.  Ordered Certavite, 15 mL/day, to help meet micronutrient needs.         REASON FOR ASSESSMENT  Tate Constantino is a 37 year old male assessed by the dietitian for Provider Order - Registered Dietitian to Assess and Order TF per Medical Nutrition Therapy Protocol.    NUTRITION HISTORY  Pt was eating normally up until yesterday afternoon when incident of esophageal perforation occurred. Pt lifts weights for exercise. He has a h/o dyslipidemia with high TG.   A PEGJ was placed during the procedure yesterday.    CURRENT NUTRITION ORDERS  Diet: NPO  Intake/Tolerance: adequate    LABS  Labs reviewed- BG was 180 mg/dL at OSH but note HgbA1c was 5.2% on 7/21/18. High BG likely r/t stress. BG here have been 103-162 mg/dL. Pt potentially could be pre-diabetic.    MEDICATIONS  Medications reviewed    ANTHROPOMETRICS  Height: 167.6 cm (5' 6\")  Admission Weight: 86.2 kg (7/21/18)  Most Recent Weight: 89.4 kg (197 lb 1.5 oz)    IBW: 64.5 kg  BMI: Obesity Grade I BMI 30-34.9  Weight History:   07/20/18 86.2 kg  11/03/16 86.6 kg  03/14/14 83.9 kg    Dosing Weight: 70 kg (adj wt)    ASSESSED NUTRITION NEEDS  Estimated Energy Needs: 1551-6794 kcals/day (25 - 30 kcals/kg)  Justification: Maintenance and Post-op  Estimated Protein Needs:  g/day (1.2 - 1.5 " g/kg)  Justification: Hypercatabolism with acute illness  Estimated Fluid Needs: 2893-2437 mL/day (25 - 30 mL/kg)   Justification: Maintenance    PHYSICAL FINDINGS  See malnutrition section below.  No abnormal nutrition-related physical findings observed.     MALNUTRITION  % Intake: No decreased intake noted prior to yesterday afternoon  % Weight Loss: None noted  Subcutaneous Fat Loss: None observed  Muscle Loss: None observed  Fluid Accumulation/Edema: None noted  Malnutrition Diagnosis: Pt does not meet two of the above criteria necessary for diagnosing malnutrition but is at risk for malnutrition    NUTRITION DIAGNOSIS  Altered GI function related to esophageal perforation as evidenced by requirement of GJ tube placement to meet 100% of calorie and protein needs.      INTERVENTIONS  Implementation  Nutrition Education: See education flowsheet   Enteral Nutrition - Initiate TF    Goals  Total avg nutritional intake to meet a minimum of 25 kcal/kg and 1.2 g PRO/kg daily (per dosing wt 70 kg).     Monitoring/Evaluation  Progress toward goals will be monitored and evaluated per protocol.    Hiral Shetty, RD, LD  ([facility] dietitian)

## 2018-07-22 NOTE — CONSULTS
I came by to see the patient, but he is pretty worn out post op. I will come by in a few days to complete consult.   page me at [phone] as needed

## 2018-07-22 NOTE — PLAN OF CARE
Problem: Patient Care Overview  Goal: Plan of Care/Patient Progress Review  Outcome: No Change  S: 0200 noted pt BP/RR climbing. Low IPI on capnography. Noted many denied attempts on PCA. Pt stated no when asked if PCA adequately relieving pain. After increasing PCA dose to 0.3 per parameters in order. Pt stated pain better controlled. BP/RR back to baseline and IPI 7-9. Elba damped all shift and unable draw blood from, so discontinued and lab ludwig pt. Plan to remove monzon 0600 delayed due to crisis with this writer's other pt.  B: S/p thoracic OR.  A: No change.  R: Remove monzon and increase activity. Continue encourage pulmonary hygiene. Assess further pain control needs. Pump history was cleared with dose increase, so could determine if fewer denied doses in addition to pt feedback.

## 2018-07-22 NOTE — PROGRESS NOTES
Surgery Progress Note  7/22/2018     Subjective:  NAEON.     Objective:  Temp:  [97.8  F (36.6  C)-100.4  F (38  C)] 99.1  F (37.3  C)  Heart Rate:  [] 100  Resp:  [16-39] 28  BP: (107-150)/() 149/78  MAP:  [71 mmHg-109 mmHg] 72 mmHg  Arterial Line BP: ()/(58-99) 78/66  SpO2:  [88 %-98 %] 93 %  I/O last 3 completed shifts:  In: 4334.25 [I.V.:4334.25]  Out: 2565 [Urine:1655; Blood:100; Chest Tube:810]    Gen: Awake, alert, NAD   Resp: NLB on 4L NC  CV: HDS, stable tachycardia  Ext: WWP  Bilateral CT: s/s drainage, no air leak noted      Labs: reviewed WBC 14.4  Imaging: CXR: improved pleural effusion      A/P: Tate Constantino is a 37 year old male with esophageal perforation s/p emergent EGD, PEGJ placement, esophageal stent, left thoracotomy with bilateral chest tubes.    Neuro/Pain/Sedation: switch from PCA to solutions through J tube  Pulm: encourage IS, pulmonary toilet  CV: HDS  GI: start nutrition through tube feeds  Renal/lytes:D5 1/2NS @75cc,Electrolyte replacement prn  ID: vanc and zosyn  -Potentially transfer to  tomorrow if continues to improve   -continue cares per SICU     GI Ppx: protonix  DVT Ppx: heparin subq     Disposition: pending clinical course       Seen w/ fellow    Adriane Inman MD  PGY-1 General Surgery

## 2018-07-23 ENCOUNTER — APPOINTMENT (OUTPATIENT)
Dept: GENERAL RADIOLOGY | Facility: CLINIC | Age: 38
End: 2018-07-23
Attending: THORACIC SURGERY (CARDIOTHORACIC VASCULAR SURGERY)
Payer: COMMERCIAL

## 2018-07-23 ENCOUNTER — APPOINTMENT (OUTPATIENT)
Dept: GENERAL RADIOLOGY | Facility: CLINIC | Age: 38
End: 2018-07-23
Attending: PHYSICIAN ASSISTANT
Payer: COMMERCIAL

## 2018-07-23 ENCOUNTER — APPOINTMENT (OUTPATIENT)
Dept: PHYSICAL THERAPY | Facility: CLINIC | Age: 38
End: 2018-07-23
Attending: THORACIC SURGERY (CARDIOTHORACIC VASCULAR SURGERY)
Payer: COMMERCIAL

## 2018-07-23 LAB
ALBUMIN SERPL-MCNC: 2.3 G/DL (ref 3.4–5)
ALP SERPL-CCNC: 48 U/L (ref 40–150)
ALT SERPL W P-5'-P-CCNC: 27 U/L (ref 0–70)
ANION GAP SERPL CALCULATED.3IONS-SCNC: 27 MMOL/L (ref 3–14)
ANION GAP SERPL CALCULATED.3IONS-SCNC: 5 MMOL/L (ref 3–14)
AST SERPL W P-5'-P-CCNC: 25 U/L (ref 0–45)
BASE EXCESS BLDV CALC-SCNC: 2.3 MMOL/L
BILIRUB SERPL-MCNC: 0.6 MG/DL (ref 0.2–1.3)
BUN SERPL-MCNC: 19 MG/DL (ref 7–30)
BUN SERPL-MCNC: 20 MG/DL (ref 7–30)
CALCIUM SERPL-MCNC: 8.2 MG/DL (ref 8.5–10.1)
CALCIUM SERPL-MCNC: 8.3 MG/DL (ref 8.5–10.1)
CHLORIDE SERPL-SCNC: 107 MMOL/L (ref 94–109)
CHLORIDE SERPL-SCNC: 108 MMOL/L (ref 94–109)
CO2 SERPL-SCNC: 26 MMOL/L (ref 20–32)
CO2 SERPL-SCNC: 6 MMOL/L (ref 20–32)
CREAT SERPL-MCNC: 0.83 MG/DL (ref 0.66–1.25)
CREAT SERPL-MCNC: 0.87 MG/DL (ref 0.66–1.25)
ERYTHROCYTE [DISTWIDTH] IN BLOOD BY AUTOMATED COUNT: 13.7 % (ref 10–15)
GFR SERPL CREATININE-BSD FRML MDRD: >90 ML/MIN/1.7M2
GFR SERPL CREATININE-BSD FRML MDRD: >90 ML/MIN/1.7M2
GLUCOSE BLDC GLUCOMTR-MCNC: 137 MG/DL (ref 70–99)
GLUCOSE BLDC GLUCOMTR-MCNC: 156 MG/DL (ref 70–99)
GLUCOSE BLDC GLUCOMTR-MCNC: 157 MG/DL (ref 70–99)
GLUCOSE BLDC GLUCOMTR-MCNC: 166 MG/DL (ref 70–99)
GLUCOSE SERPL-MCNC: 145 MG/DL (ref 70–99)
GLUCOSE SERPL-MCNC: 168 MG/DL (ref 70–99)
HCO3 BLDV-SCNC: 28 MMOL/L (ref 21–28)
HCT VFR BLD AUTO: 40.3 % (ref 40–53)
HGB BLD-MCNC: 12.9 G/DL (ref 13.3–17.7)
HGB BLD-MCNC: 13.5 G/DL (ref 13.3–17.7)
LACTATE BLD-SCNC: 0.8 MMOL/L (ref 0.7–2)
MAGNESIUM SERPL-MCNC: 2.1 MG/DL (ref 1.6–2.3)
MCH RBC QN AUTO: 29.6 PG (ref 26.5–33)
MCHC RBC AUTO-ENTMCNC: 33.5 G/DL (ref 31.5–36.5)
MCV RBC AUTO: 88 FL (ref 78–100)
O2/TOTAL GAS SETTING VFR VENT: 100 %
PCO2 BLDV: 46 MM HG (ref 40–50)
PH BLDV: 7.4 PH (ref 7.32–7.43)
PHOSPHATE SERPL-MCNC: 1 MG/DL (ref 2.5–4.5)
PHOSPHATE SERPL-MCNC: 1.4 MG/DL (ref 2.5–4.5)
PLATELET # BLD AUTO: 174 10E9/L (ref 150–450)
PO2 BLDV: 70 MM HG (ref 25–47)
POTASSIUM SERPL-SCNC: 3.6 MMOL/L (ref 3.4–5.3)
POTASSIUM SERPL-SCNC: 3.9 MMOL/L (ref 3.4–5.3)
PROT SERPL-MCNC: 6.3 G/DL (ref 6.8–8.8)
RBC # BLD AUTO: 4.56 10E12/L (ref 4.4–5.9)
SODIUM SERPL-SCNC: 139 MMOL/L (ref 133–144)
SODIUM SERPL-SCNC: 140 MMOL/L (ref 133–144)
VANCOMYCIN SERPL-MCNC: 7.8 MG/L
WBC # BLD AUTO: 10 10E9/L (ref 4–11)

## 2018-07-23 PROCEDURE — 36415 COLL VENOUS BLD VENIPUNCTURE: CPT | Performed by: NURSE PRACTITIONER

## 2018-07-23 PROCEDURE — 80053 COMPREHEN METABOLIC PANEL: CPT | Performed by: THORACIC SURGERY (CARDIOTHORACIC VASCULAR SURGERY)

## 2018-07-23 PROCEDURE — 00000146 ZZHCL STATISTIC GLUCOSE BY METER IP

## 2018-07-23 PROCEDURE — 25000128 H RX IP 250 OP 636: Performed by: THORACIC SURGERY (CARDIOTHORACIC VASCULAR SURGERY)

## 2018-07-23 PROCEDURE — 80048 BASIC METABOLIC PNL TOTAL CA: CPT | Performed by: NURSE PRACTITIONER

## 2018-07-23 PROCEDURE — 71046 X-RAY EXAM CHEST 2 VIEWS: CPT

## 2018-07-23 PROCEDURE — 99233 SBSQ HOSP IP/OBS HIGH 50: CPT | Performed by: NURSE PRACTITIONER

## 2018-07-23 PROCEDURE — 83735 ASSAY OF MAGNESIUM: CPT | Performed by: THORACIC SURGERY (CARDIOTHORACIC VASCULAR SURGERY)

## 2018-07-23 PROCEDURE — 25000132 ZZH RX MED GY IP 250 OP 250 PS 637: Performed by: STUDENT IN AN ORGANIZED HEALTH CARE EDUCATION/TRAINING PROGRAM

## 2018-07-23 PROCEDURE — 97116 GAIT TRAINING THERAPY: CPT | Mod: GP | Performed by: REHABILITATION PRACTITIONER

## 2018-07-23 PROCEDURE — 40000275 ZZH STATISTIC RCP TIME EA 10 MIN

## 2018-07-23 PROCEDURE — 85018 HEMOGLOBIN: CPT | Performed by: NURSE PRACTITIONER

## 2018-07-23 PROCEDURE — 25000132 ZZH RX MED GY IP 250 OP 250 PS 637: Performed by: NURSE PRACTITIONER

## 2018-07-23 PROCEDURE — 40000193 ZZH STATISTIC PT WARD VISIT: Performed by: REHABILITATION PRACTITIONER

## 2018-07-23 PROCEDURE — 83605 ASSAY OF LACTIC ACID: CPT | Performed by: NURSE PRACTITIONER

## 2018-07-23 PROCEDURE — 99222 1ST HOSP IP/OBS MODERATE 55: CPT | Performed by: PSYCHIATRY & NEUROLOGY

## 2018-07-23 PROCEDURE — 27210429 ZZH NUTRITION PRODUCT INTERMEDIATE LITER

## 2018-07-23 PROCEDURE — 94799 UNLISTED PULMONARY SVC/PX: CPT

## 2018-07-23 PROCEDURE — 25000125 ZZHC RX 250: Performed by: SURGERY

## 2018-07-23 PROCEDURE — 85027 COMPLETE CBC AUTOMATED: CPT | Performed by: THORACIC SURGERY (CARDIOTHORACIC VASCULAR SURGERY)

## 2018-07-23 PROCEDURE — 25000125 ZZHC RX 250: Performed by: PHYSICIAN ASSISTANT

## 2018-07-23 PROCEDURE — 71045 X-RAY EXAM CHEST 1 VIEW: CPT

## 2018-07-23 PROCEDURE — 97530 THERAPEUTIC ACTIVITIES: CPT | Mod: GP | Performed by: REHABILITATION PRACTITIONER

## 2018-07-23 PROCEDURE — 84100 ASSAY OF PHOSPHORUS: CPT | Performed by: THORACIC SURGERY (CARDIOTHORACIC VASCULAR SURGERY)

## 2018-07-23 PROCEDURE — 25800025 ZZH RX 258: Performed by: THORACIC SURGERY (CARDIOTHORACIC VASCULAR SURGERY)

## 2018-07-23 PROCEDURE — 25000132 ZZH RX MED GY IP 250 OP 250 PS 637: Performed by: PHYSICIAN ASSISTANT

## 2018-07-23 PROCEDURE — 25000128 H RX IP 250 OP 636: Performed by: SURGERY

## 2018-07-23 PROCEDURE — 82803 BLOOD GASES ANY COMBINATION: CPT | Performed by: NURSE PRACTITIONER

## 2018-07-23 PROCEDURE — 25000132 ZZH RX MED GY IP 250 OP 250 PS 637: Performed by: THORACIC SURGERY (CARDIOTHORACIC VASCULAR SURGERY)

## 2018-07-23 PROCEDURE — 12000006 ZZH R&B IMCU INTERMEDIATE UMMC

## 2018-07-23 PROCEDURE — 80202 ASSAY OF VANCOMYCIN: CPT | Performed by: THORACIC SURGERY (CARDIOTHORACIC VASCULAR SURGERY)

## 2018-07-23 PROCEDURE — 36415 COLL VENOUS BLD VENIPUNCTURE: CPT | Performed by: THORACIC SURGERY (CARDIOTHORACIC VASCULAR SURGERY)

## 2018-07-23 PROCEDURE — 94640 AIRWAY INHALATION TREATMENT: CPT

## 2018-07-23 PROCEDURE — 94640 AIRWAY INHALATION TREATMENT: CPT | Mod: 76

## 2018-07-23 RX ORDER — SODIUM CHLORIDE FOR INHALATION 3 %
3 VIAL, NEBULIZER (ML) INHALATION
Status: DISCONTINUED | OUTPATIENT
Start: 2018-07-23 | End: 2018-08-01

## 2018-07-23 RX ORDER — IPRATROPIUM BROMIDE AND ALBUTEROL SULFATE 2.5; .5 MG/3ML; MG/3ML
3 SOLUTION RESPIRATORY (INHALATION)
Status: DISCONTINUED | OUTPATIENT
Start: 2018-07-23 | End: 2018-08-01

## 2018-07-23 RX ADMIN — PIPERACILLIN SODIUM AND TAZOBACTAM SODIUM 3.38 G: 3; .375 INJECTION, POWDER, LYOPHILIZED, FOR SOLUTION INTRAVENOUS at 22:00

## 2018-07-23 RX ADMIN — HEPARIN SODIUM 5000 UNITS: 5000 INJECTION, SOLUTION INTRAVENOUS; SUBCUTANEOUS at 03:30

## 2018-07-23 RX ADMIN — SODIUM CHLORIDE SOLN NEBU 3% 3 ML: 3 NEBU SOLN at 17:16

## 2018-07-23 RX ADMIN — POTASSIUM & SODIUM PHOSPHATES POWDER PACK 280-160-250 MG 1 PACKET: 280-160-250 PACK at 12:09

## 2018-07-23 RX ADMIN — POTASSIUM & SODIUM PHOSPHATES POWDER PACK 280-160-250 MG 1 PACKET: 280-160-250 PACK at 08:02

## 2018-07-23 RX ADMIN — IPRATROPIUM BROMIDE AND ALBUTEROL SULFATE 3 ML: .5; 3 SOLUTION RESPIRATORY (INHALATION) at 12:21

## 2018-07-23 RX ADMIN — ACETAMINOPHEN 650 MG: 325 SOLUTION ORAL at 17:31

## 2018-07-23 RX ADMIN — HEPARIN SODIUM 5000 UNITS: 5000 INJECTION, SOLUTION INTRAVENOUS; SUBCUTANEOUS at 12:09

## 2018-07-23 RX ADMIN — OXYCODONE HYDROCHLORIDE 5 MG: 5 SOLUTION ORAL at 06:22

## 2018-07-23 RX ADMIN — OXYCODONE HYDROCHLORIDE 10 MG: 5 SOLUTION ORAL at 21:38

## 2018-07-23 RX ADMIN — SODIUM CHLORIDE SOLN NEBU 3% 3 ML: 3 NEBU SOLN at 12:21

## 2018-07-23 RX ADMIN — OXYCODONE HYDROCHLORIDE 5 MG: 5 SOLUTION ORAL at 10:26

## 2018-07-23 RX ADMIN — ACETAMINOPHEN 650 MG: 325 SOLUTION ORAL at 21:42

## 2018-07-23 RX ADMIN — HEPARIN SODIUM 5000 UNITS: 5000 INJECTION, SOLUTION INTRAVENOUS; SUBCUTANEOUS at 20:11

## 2018-07-23 RX ADMIN — ACETAMINOPHEN 650 MG: 325 SOLUTION ORAL at 01:56

## 2018-07-23 RX ADMIN — IPRATROPIUM BROMIDE AND ALBUTEROL SULFATE 3 ML: .5; 3 SOLUTION RESPIRATORY (INHALATION) at 20:55

## 2018-07-23 RX ADMIN — DEXTROSE AND SODIUM CHLORIDE: 5; 450 INJECTION, SOLUTION INTRAVENOUS at 17:36

## 2018-07-23 RX ADMIN — OXYCODONE HYDROCHLORIDE 10 MG: 5 SOLUTION ORAL at 01:56

## 2018-07-23 RX ADMIN — PIPERACILLIN SODIUM AND TAZOBACTAM SODIUM 3.38 G: 3; .375 INJECTION, POWDER, LYOPHILIZED, FOR SOLUTION INTRAVENOUS at 04:29

## 2018-07-23 RX ADMIN — ACETAMINOPHEN 650 MG: 325 SOLUTION ORAL at 10:26

## 2018-07-23 RX ADMIN — SODIUM PHOSPHATE, MONOBASIC, MONOHYDRATE AND SODIUM PHOSPHATE, DIBASIC, ANHYDROUS 25 MMOL: 276; 142 INJECTION, SOLUTION INTRAVENOUS at 07:56

## 2018-07-23 RX ADMIN — ACETAMINOPHEN 650 MG: 325 SOLUTION ORAL at 14:31

## 2018-07-23 RX ADMIN — POTASSIUM & SODIUM PHOSPHATES POWDER PACK 280-160-250 MG 1 PACKET: 280-160-250 PACK at 17:31

## 2018-07-23 RX ADMIN — OXYCODONE HYDROCHLORIDE 10 MG: 5 SOLUTION ORAL at 14:32

## 2018-07-23 RX ADMIN — VANCOMYCIN HYDROCHLORIDE 1750 MG: 10 INJECTION, POWDER, LYOPHILIZED, FOR SOLUTION INTRAVENOUS at 05:18

## 2018-07-23 RX ADMIN — MULTIVITAMIN 15 ML: LIQUID ORAL at 08:02

## 2018-07-23 RX ADMIN — SODIUM CHLORIDE SOLN NEBU 3% 3 ML: 3 NEBU SOLN at 20:55

## 2018-07-23 RX ADMIN — FLUCONAZOLE IN SODIUM CHLORIDE 400 MG: 2 INJECTION, SOLUTION INTRAVENOUS at 03:23

## 2018-07-23 RX ADMIN — PANTOPRAZOLE SODIUM 40 MG: 40 TABLET, DELAYED RELEASE ORAL at 08:02

## 2018-07-23 RX ADMIN — PIPERACILLIN SODIUM AND TAZOBACTAM SODIUM 3.38 G: 3; .375 INJECTION, POWDER, LYOPHILIZED, FOR SOLUTION INTRAVENOUS at 10:27

## 2018-07-23 RX ADMIN — ACETAMINOPHEN 650 MG: 325 SOLUTION ORAL at 06:22

## 2018-07-23 RX ADMIN — DEXTROSE AND SODIUM CHLORIDE: 5; 450 INJECTION, SOLUTION INTRAVENOUS at 03:23

## 2018-07-23 RX ADMIN — IPRATROPIUM BROMIDE AND ALBUTEROL SULFATE 3 ML: .5; 3 SOLUTION RESPIRATORY (INHALATION) at 17:16

## 2018-07-23 RX ADMIN — POTASSIUM & SODIUM PHOSPHATES POWDER PACK 280-160-250 MG 1 PACKET: 280-160-250 PACK at 20:11

## 2018-07-23 ASSESSMENT — PAIN DESCRIPTION - DESCRIPTORS
DESCRIPTORS: SHARP
DESCRIPTORS: DISCOMFORT
DESCRIPTORS: DISCOMFORT
DESCRIPTORS: SHARP
DESCRIPTORS: ACHING;CONSTANT

## 2018-07-23 ASSESSMENT — ACTIVITIES OF DAILY LIVING (ADL)
ADLS_ACUITY_SCORE: 9

## 2018-07-23 NOTE — PLAN OF CARE
Problem: Patient Care Overview  Goal: Plan of Care/Patient Progress Review  Outcome: Improving  S: With ok use Jtube for meds, transitioned off PCA to oxy with 2200 tylenol. States pain fairly well controlled and able sleep between cares. Chose to sleep in recliner, stating it is more comfortable in bed. Placed seated positioning system. Encouraged to shift weight and assisted to stand and march in place with void attempts. Was unable void. Straight cathed when bladder scan volume >300 ml. Cough effort fair strength, productive x1. Have been able wean O2. IS volume 550-600 tonight. Phosphorous 1.0 this morning. Started TF yesterday.  B: S/p repair esoph perf.  A: Improving.  R: Continue assess skin and encourage position changes. Scan bladder and straight cath as needed if unable void. Encourage pulmonary hygiene and wean to room air.

## 2018-07-23 NOTE — PHARMACY-VANCOMYCIN DOSING SERVICE
Pharmacy Vancomycin Note  Date of Service 2018  Patient's  1980   37 year old, male    Indication: Esophageal perforation  Goal Trough Level: 15-20 mg/L  Day of Therapy: 3  Current Vancomycin regimen: 1750 mg IV q12h    Current estimated CrCl = Estimated Creatinine Clearance: 119.3 mL/min (based on Cr of 0.87).    Creatinine for last 3 days  2018:  1:30 AM Creatinine 0.96 mg/dL;  3:24 PM Creatinine 0.97 mg/dL  2018:  5:54 AM Creatinine 0.97 mg/dL  2018:  3:20 AM Creatinine 0.87 mg/dL    Recent Vancomycin Levels (past 3 days)  2018:  3:20 AM Vancomycin Level 7.8 mg/L    Vancomycin IV Administrations (past 72 hours)                   vancomycin (VANCOCIN) 1,750 mg in sodium chloride 0.9 % 500 mL intermittent infusion (mg) 1,750 mg New Bag 18 0518     1,750 mg New Bag 18 1547     1,750 mg New Bag  0456     1,750 mg New Bag 18 1645     1,750 mg New Bag  0521                Nephrotoxins and other renal medications (Future)    Start     Dose/Rate Route Frequency Ordered Stop    18 1318  vancomycin (VANCOCIN) 1,750 mg in sodium chloride 0.9 % 500 mL intermittent infusion      1,750 mg  over 2 Hours Intravenous EVERY 8 HOURS 18 0653      18 0400  piperacillin-tazobactam (ZOSYN) 3.375 g vial to attach to  mL bag      3.375 g  over 30 Minutes Intravenous EVERY 6 HOURS 18 0216               Contrast Orders - past 72 hours (72h ago through future)    Start     Dose/Rate Route Frequency Ordered Stop    18 1041  iothalamate meglumine (CONRAY) 60 % injection  Status:  Discontinued        PRN 18 1042 18 1430    18 0315  iopamidol (ISOVUE-370) solution 80 mL      80 mL Intravenous ONCE 18 0302 18 0302          Interpretation of levels and current regimen:  Trough level is  Subtherapeutic  Has serum creatinine changed > 50% in last 72 hours: No  Urine output:  good urine output  Renal Function: Stable    Plan:  1.  Increase vancomycin to 1750 mg (19 mg/kg) q8h.   2. Pharmacy will check trough levels as appropriate in 1-3 Days.    3. Serum creatinine levels will be ordered daily for the first week of therapy and at least twice weekly for subsequent weeks.      Tate Simons, PharmD, MS

## 2018-07-23 NOTE — CONSULTS
Patient seen and chart reviewed. Note dictated (initial)   I don't see evidence of significant drug use problems.  Utox positive for methamphetamine likely due to taking bronch aid  I can't account for positive benzodiazepine screen, but don't see evidence of significant problems   Can try hydroxyzine 25-50 mg HS   Re-consult psychiatry as needed.

## 2018-07-23 NOTE — PROGRESS NOTES
SURGICAL ICU PROGRESS NOTE  07/23/2018      ASSESSMENT: Mr Constantino is a 36 yo M who is otherwise who presents for an esophageal perforation now POD # 2 emergent EGD, PEGj placement, esophageal stent placement, thoracectomy and bilateral chest tube placement. The patient has been hemodynamically stable in the unit and is no longer in need of ICU care.       Plan/interval progress  Neutraphos  Dc art line  Dc vancomycin     PLAN:   Neuro/ pain/ sedation:  # Post operative pain  # polysubstance abuse   - Monitor neurological status. Notify the MD for any acute changes in exam.  - PRN Dilaudid, PRN Oxycodone. Icy hot patches.   - Social work on board given positive methamphetamine and benzodiazepine on urine toxicology screen      Pulmonary care:   -Supplemental oxygen to keep saturation above 92 %.  -IS. CBD, Mobilize.        Cardiovascular:    - Monitor hemodynamic status.  Hemodynamically stable.      GI care:   #Esophageal perforation, s/p esophagogastroduodenoscopy, GJ placement, esophageal stent   - Strict NPO  - PEG tube to gravity.   - PPI     Fluids/ Electrolytes/ Nutrition:   # Hypophosphatemia   # Protein calorie malnutrition in critical illness  - D5 1/2NS at 75cc/hr for IV fluid hydration  - ICU electrolyte replacement protocol  - started on neutraphos x 2 days plus IV replacement protocol  - TF currently at 25 ml/hr. Hold further advancement until electrolytes replaced.     Renal/ Fluid Balance:    - Will monitor intake and output.  - Rosenberg out.        Endocrine:    - sliding scale insulin available.        ID/ Antibiotics:  #Esophageal perforation  - Zoysn, vanc, fluconazole for esophageal perforation prophylaxis. DC Vancomycin today.        Heme:     #Acute blood loss anemia  - hgb stable 12.9       Prophylaxis:    - Mechanical prophylaxis for DVT.  - Heparin subcutaneous        Lines/ tubes/ drains:  - PIVs  - R and L chest tubes to water seal       Disposition:  - Transfer to the floor     Kaycee De  Brianna Catragena  Critical care time 30 minutes       ====================================    SUBJECTIVE:   No acute events overnight. The patient does complain of incisional chest pain that he currently rates at 6/10. The pain is made worse by breathing. He is also complaining of is chest tubes pulling on both sides. He had no further concerns or questions and denied any headaches, dyspnea, abdominal pain or urinary changes.     OBJECTIVE:   1. VITAL SIGNS:   Temp:  [98.1  F (36.7  C)-99.8  F (37.7  C)] 98.1  F (36.7  C)  Heart Rate:  [] 85  Resp:  [10-37] 18  BP: (112-151)/(67-91) 126/71  SpO2:  [92 %-98 %] 98 %  Resp: 18    2. INTAKE/ OUTPUT:   I/O last 3 completed shifts:  In: 2068.25 [I.V.:1918.25; NG/GT:30]  Out: 1530 [Urine:1010; Emesis/NG output:50; Chest Tube:470]    3. PHYSICAL EXAMINATION:   General: calm gentleman sitting in chair, NAD   HEENT: Atraumatic and normocephalic   Neuro: A&Ox3, moving all extremities strongly to command. No focal deficits.   Resp: Shallow breaths. Breathing non-labored. Clear lung sounds. Bilateral chest tubes to water seal, tidaling, no leak. Serosanguinous output on both.   CV: RRR, no murmurs   Abdomen: G tube to gravity. Soft, Non-distended, Non-tender  : no monzon   Extremities: warm and well perfused and 2+ peripheral pulses     4. INVESTIGATIONS:   Arterial Blood Gases     Recent Labs  Lab 07/21/18  1044 07/21/18  0828   PH 7.40 7.39   PCO2 36 34*   PO2 327* 134*   HCO3 22 21     Complete Blood Count     Recent Labs  Lab 07/23/18  0320 07/22/18  0554 07/21/18  1524 07/21/18  1044 07/21/18  0828 07/21/18  0130   WBC 10.0 10.4  --   --   --  14.4*   HGB 13.5 14.3  --  14.0 13.1* 14.3    193 182  --   --  199     Basic Metabolic Panel    Recent Labs  Lab 07/23/18  0320 07/22/18  0554 07/21/18  1524 07/21/18  1044 07/21/18  0828 07/21/18  0130    138  --  137 136 140   POTASSIUM 3.9 4.0  --  3.9 3.8 4.3   CHLORIDE 107 106  --   --   --  110*   CO2 6* 24  --   --    --  22   BUN 19 20  --   --   --  25   CR 0.87 0.97 0.97  --   --  0.96   * 103*  --  149* 136* 162*     Liver Function Tests    Recent Labs  Lab 07/23/18  0320 07/22/18  0554 07/21/18  0130   AST 25 32  --    ALT 27 29  --    ALKPHOS 48 37*  --    BILITOTAL 0.6 1.0  --    ALBUMIN 2.3* 2.4*  --    INR  --   --  1.08     Pancreatic Enzymes  No lab results found in last 7 days.  Coagulation Profile    Recent Labs  Lab 07/21/18  0130   INR 1.08     Lactate  Invalid input(s): LACTATE    5. RADIOLOGY:   No results found for this or any previous visit (from the past 24 hour(s)).    =========================================      Resident/Fellow Attestation   I,  Kaycee Banegas, was present with the medical student who participated in the service and in the documentation of the note.  I have verified the history and personally performed the physical exam and medical decision making.  I agree with the assessment and plan of care as documented in the note.      Kaycee Banegas CNP  Date of Service (when I saw the patient): 07/23/18    Patient seen, findings and plan discussed with surgical ICU staff Dr. Servin.  - - - - - - - - - - - - - - - - - -  Cristóbal Munoz    See [facility] for on-call pager information.

## 2018-07-23 NOTE — PLAN OF CARE
Problem: Patient Care Overview  Goal: Plan of Care/Patient Progress Review  Discharge Planner PT 4AB  Patient plan for discharge: Did Not Discuss this session  Current status: Pt amb ~ 100 feet with Min A. Pt tx supine->sit->stand with Min A. Pt on 2 lpm via NC with all PT activity, O2 sat remains 96% throughout PT session. Pt educated on left thoractomy precautions.   Barriers to return to prior living situation: medical status, respiratory status, chest tubes, incisional pain  Recommendations for discharge: Home with assist  Rationale for recommendations: Pt demonstrating improved mobility this date, anticipate when pt is medically ready for discharge he will be safe to discharge home.        Entered by: Kate Canales 07/23/2018 3:42 PM

## 2018-07-23 NOTE — PROGRESS NOTES
Transfer  Transferred from: 4A  Via:bed  Reason for transfer:Pt appropriate for 6B- improved patient condition  Family: Aware of transfer  Belongings: Received with pt  Chart: Received with pt  Medications: Meds received from old unit with pt  2 RN Skin Assessment Completed By: Anna POWELL & Mary POWELL  Report received from: ANDRE Love  Pt status: Patient is A/O, he is calm and cooperative, sinus rhythm, normotensive, complains of discomfort, chest tubes to water seal w/ airleak, Gtube to gravity, J tube to enteral feeds 25mL/hr, incisions/dressings C/D/I.

## 2018-07-23 NOTE — PROGRESS NOTES
THORACIC & FOREGUT SURGERY    S:  No acute overnight events.  Pt seen at bedside resting comfortably.  Concerned regarding dyspnea on exertion.    O:  Vitals:    07/23/18 0600 07/23/18 0630 07/23/18 0700 07/23/18 0800   BP: 140/86  141/83    BP Location:       Resp: (!) 34 (!) 31 20    Temp:    98.5  F (36.9  C)   TempSrc:    Oral   SpO2: 98% 91% 96%    Weight:       Height:           A&Ox3, NAD  Breathing non-labored on low flow nasal canula  RRR per tele  Soft, NDNT, GJ tube in place  Distal extremities warm    A/P: Tate Constantino is a 37 year old male POD#2 w/ esophageal perforation s/p emergent EGD, PEGJ placement, esophageal stent, left thoracotomy with bilateral chest tubes.    -Continue current abx (vanc/zosyn/diflucan)  -Continue to advance TFs to goal  -Scheduled nebs  -TID SQH  -OK for transfer to     Kuldeep Lundberg PA-C  Thoracic Surgery

## 2018-07-23 NOTE — PLAN OF CARE
Problem: Patient Care Overview  Goal: Plan of Care/Patient Progress Review  Outcome: Improving  D: POD#2 left thoracotomy, repair of esophogeal perforation s/p stent, b/l chest tubes, PEG    Neuro: AOx4, PERRLA, sleeping between cares, denies numbness/tingling, moves all extremities purposefully  CV: SR/ST 90's- low 120's. BP within parameters, no intervention required. 2+pulses in all extremities. Cap refills less than 2. C/o pain on right chest tube incision site.   Pulm: 4L NC, shallow breathing d/t pain, weak to moderate cough, no clubbing, no cyanosis. Denies shortness of breath. Chest tubes x3 with minimal output that is serous to serosang in color.   GI/: Tube feeds started. See flow sheet.  G-tube to gravity with bile to coffee ground output, MD, notified. Shakira lofton'd.   Skin: no discolorations, warm, intact, no lesions.   Access:PIVx3  upto the chair with PT.      Plan: Possible transfer tomorrow. Will continue to monitor per plan of care. Notify MD with concerns.

## 2018-07-23 NOTE — CONSULTS
Consult Date:  07/23/2018      REQUESTING SOURCE:  Patricio Cameron PA-C.      IDENTIFYING DATA:  This 37-year-old man seen today for psychiatric consultation regarding his history of anxiety and also a urine tox screen positive at the [facility] for methamphetamine and benzodiazepines.      HISTORY OF PRESENT ILLNESS:  Mr. Constantino had presented in transfer following being diagnosed with esophageal perforation.  This has been addressed surgically and he is recovering nicely.  In the chart there is mention of depression or anxiety and when asked to clarify this he said that many years ago he had taken some Xanax for anxiety and more recently has taken Lexapro, but he did not care for the side effects.  Also has intermittent sleep difficulties for which he tried trazodone, but he did not care for this either.  He does have some intermittent problems with anxiety, but it is not particularly severe.  He would not mind having occasional tranquilizer, but he says he is not taking anything.  He cannot account for the positive benzodiazepine assay in his urine tox screen at [facility].  He also denies problems with depression, but he does have intermittent sleep difficulties.  No significant mood swings or manic symptoms.  No history of thought disorder symptoms.      PAST PSYCHIATRIC HISTORY:  No psychiatric hospitalizations.  He has had brief trials of Xanax, Lexapro and trazodone, the latter 2 of which did not agree with him.  No psychotherapy.      SUBSTANCE USE HISTORY:  Denies use of methamphetamine, benzodiazepines and says he uses alcohol only sparingly.  He does mention that he takes Bronkaid before workouts.  He said that that could explain his methamphetamine assay in the urine.  He does not use cigarettes.      PAST MEDICAL HISTORY:  He had some dyslipidemia, epistaxis and 1 other episode of food sticking in the esophagus.      PAST SURGICAL HISTORY:  [place] teeth, knee arthroscopy and shoulder surgery x 2.  "     OUTPATIENT MEDICATIONS:  Takes Bronkaid prior to workouts and a multivitamin.      ALLERGIES:  SULFA DRUGS AND NAPROSYN.      REVIEW OF SYSTEMS:  A 10-point review of systems today is negative except for some postoperative pain.      FAMILY HISTORY:  Positive for some anxiety in several family members.  No substance use problems.      SOCIAL HISTORY: Grew up in [place], [place] where he continues to live.  He had 2 siblings and his parents were  when he was growing up.  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  Following a shoulder injury in 2005 he was unable to work and eventually had to move home with his parents, where he continues to live.  No children.      MENTAL STATUS EXAMINATION:  Sitting up in the hospital bed, is well groomed, pleasant, cooperative. Speech fluent. There is no rigidity of the extremities.  Associations tight.  Mood is \"okay.\"  Affect congruent.  Thought process logical, linear.  Thought content negative for suicidal thoughts or delusions.  Oriented x3.  Recent and remote memory, attention span and concentration are intact.  Fund of knowledge, use of language appropriate.  Insight and judgment good.           VITAL SIGNS:  Blood pressure 115/89, pulse 99, temperature 98.7.      DIAGNOSES:   1. Anxiety disorder.     2. Mild insomnia.      IMPRESSION:  I think it is likely that his urine tox screen positive for methamphetamine was due to the Bronkaid that he is taking prior to work outs.  I did inform him that is not a particularly healthy practice.  I cannot account for the positive benzodiazepine screen; he may be getting some off the street, but I do not have evidence of significant drug abuse in his situation.  He is troubled by intermittent insomnia and did not care for trazodone.  I think it is probably best if we stay away from Ambien or Ativan.      RECOMMENDATIONS:   1.  You could try some Hydroxyzine 50 mg at bedtime, but I " suspect he will have side effects from this like he did with the trazodone.     2.  Reconsult Psychiatry as needed.         SKYLER CHAUDHARI MD             D: 2018   T: 2018   MT: DANNY      Name:     CHRISTIANA BUSTAMANTE   MRN:      [id number]        Account:       [id number]   :      1980           Consult Date:  2018      Document: [id number]       cc: Patricio Cameron PA-C

## 2018-07-23 NOTE — PLAN OF CARE
Problem: Patient Care Overview  Goal: Plan of Care/Patient Progress Review  Outcome: Improving  Status  D/I: Patient on unit 4A Surgical/Neuro ICU   Neuro- alert and oriented x4, moves all extremities, pain at chest tube insertion sites and incision, tylenol and oxy moderately controls pain  CV- HR 80's-90's, no ectopy noted  Pulm- room air to nasal cannula 2L, lungs clear, IS and acapella, shallow breaths due to pain at times  GI/- GJ tube, G tube to gravity, dark grey/green output, J tube with TF at 25ml/hr, not increasing at this time due to low phos, replacing at the moment, 1600 recheck in for lab to collect, voiding, no bowel movement   Skin- incisions and chest tube insertions sites, no other issues noted  Gtts- TKO, D% 0.45 NS at 75 ml/hr, intermittent Abx  Electrolytes- Phos being replaced and neutra-phos packets added  Up with 2 assist due to tubes and lines; report given to 6B RN and patient will transfer to  rm 36-1  See flow sheets for further interventions and assessments.  P: Continue to monitor pt closely, Notify MD of changes/concerns.

## 2018-07-23 NOTE — PROVIDER NOTIFICATION
MD notified d/t sinus tachycardia 120's, shallow labored breathing, 30's-40's in RR, and diaphragmatic. Revealed right chest tube was kinked closed the incision site. MD states give an hour for respiratory to improved.

## 2018-07-24 ENCOUNTER — APPOINTMENT (OUTPATIENT)
Dept: GENERAL RADIOLOGY | Facility: CLINIC | Age: 38
End: 2018-07-24
Attending: THORACIC SURGERY (CARDIOTHORACIC VASCULAR SURGERY)
Payer: COMMERCIAL

## 2018-07-24 LAB
ERYTHROCYTE [DISTWIDTH] IN BLOOD BY AUTOMATED COUNT: 13.7 % (ref 10–15)
GLUCOSE BLDC GLUCOMTR-MCNC: 124 MG/DL (ref 70–99)
GLUCOSE BLDC GLUCOMTR-MCNC: 139 MG/DL (ref 70–99)
GLUCOSE BLDC GLUCOMTR-MCNC: 146 MG/DL (ref 70–99)
GLUCOSE BLDC GLUCOMTR-MCNC: 170 MG/DL (ref 70–99)
GLUCOSE BLDC GLUCOMTR-MCNC: 178 MG/DL (ref 70–99)
HCT VFR BLD AUTO: 39.3 % (ref 40–53)
HGB BLD-MCNC: 13.7 G/DL (ref 13.3–17.7)
MAGNESIUM SERPL-MCNC: 2.2 MG/DL (ref 1.6–2.3)
MCH RBC QN AUTO: 29.8 PG (ref 26.5–33)
MCHC RBC AUTO-ENTMCNC: 34.9 G/DL (ref 31.5–36.5)
MCV RBC AUTO: 86 FL (ref 78–100)
PHOSPHATE SERPL-MCNC: 1.8 MG/DL (ref 2.5–4.5)
PHOSPHATE SERPL-MCNC: 2 MG/DL (ref 2.5–4.5)
PLATELET # BLD AUTO: 181 10E9/L (ref 150–450)
RBC # BLD AUTO: 4.59 10E12/L (ref 4.4–5.9)
WBC # BLD AUTO: 10.2 10E9/L (ref 4–11)

## 2018-07-24 PROCEDURE — 25000128 H RX IP 250 OP 636: Performed by: THORACIC SURGERY (CARDIOTHORACIC VASCULAR SURGERY)

## 2018-07-24 PROCEDURE — 71046 X-RAY EXAM CHEST 2 VIEWS: CPT

## 2018-07-24 PROCEDURE — 94640 AIRWAY INHALATION TREATMENT: CPT

## 2018-07-24 PROCEDURE — 36415 COLL VENOUS BLD VENIPUNCTURE: CPT | Performed by: THORACIC SURGERY (CARDIOTHORACIC VASCULAR SURGERY)

## 2018-07-24 PROCEDURE — 00000146 ZZHCL STATISTIC GLUCOSE BY METER IP

## 2018-07-24 PROCEDURE — 25000125 ZZHC RX 250: Performed by: SURGERY

## 2018-07-24 PROCEDURE — 25000125 ZZHC RX 250: Performed by: PHYSICIAN ASSISTANT

## 2018-07-24 PROCEDURE — 25000132 ZZH RX MED GY IP 250 OP 250 PS 637: Performed by: PHYSICIAN ASSISTANT

## 2018-07-24 PROCEDURE — 12000006 ZZH R&B IMCU INTERMEDIATE UMMC

## 2018-07-24 PROCEDURE — 25000132 ZZH RX MED GY IP 250 OP 250 PS 637: Performed by: NURSE PRACTITIONER

## 2018-07-24 PROCEDURE — 40000275 ZZH STATISTIC RCP TIME EA 10 MIN

## 2018-07-24 PROCEDURE — 25000132 ZZH RX MED GY IP 250 OP 250 PS 637: Performed by: THORACIC SURGERY (CARDIOTHORACIC VASCULAR SURGERY)

## 2018-07-24 PROCEDURE — 84100 ASSAY OF PHOSPHORUS: CPT | Performed by: THORACIC SURGERY (CARDIOTHORACIC VASCULAR SURGERY)

## 2018-07-24 PROCEDURE — 94640 AIRWAY INHALATION TREATMENT: CPT | Mod: 76

## 2018-07-24 PROCEDURE — 83735 ASSAY OF MAGNESIUM: CPT | Performed by: THORACIC SURGERY (CARDIOTHORACIC VASCULAR SURGERY)

## 2018-07-24 PROCEDURE — 85027 COMPLETE CBC AUTOMATED: CPT | Performed by: STUDENT IN AN ORGANIZED HEALTH CARE EDUCATION/TRAINING PROGRAM

## 2018-07-24 PROCEDURE — 40000556 ZZH STATISTIC PERIPHERAL IV START W US GUIDANCE

## 2018-07-24 PROCEDURE — 40000141 ZZH STATISTIC PERIPHERAL IV START W/O US GUIDANCE

## 2018-07-24 PROCEDURE — 94799 UNLISTED PULMONARY SVC/PX: CPT

## 2018-07-24 PROCEDURE — 25000132 ZZH RX MED GY IP 250 OP 250 PS 637: Performed by: STUDENT IN AN ORGANIZED HEALTH CARE EDUCATION/TRAINING PROGRAM

## 2018-07-24 PROCEDURE — 25800025 ZZH RX 258: Performed by: THORACIC SURGERY (CARDIOTHORACIC VASCULAR SURGERY)

## 2018-07-24 PROCEDURE — 25000128 H RX IP 250 OP 636: Performed by: SURGERY

## 2018-07-24 RX ORDER — BISACODYL 10 MG
10 SUPPOSITORY, RECTAL RECTAL ONCE
Status: DISCONTINUED | OUTPATIENT
Start: 2018-07-24 | End: 2018-07-25

## 2018-07-24 RX ADMIN — IPRATROPIUM BROMIDE AND ALBUTEROL SULFATE 3 ML: .5; 3 SOLUTION RESPIRATORY (INHALATION) at 13:45

## 2018-07-24 RX ADMIN — OXYCODONE HYDROCHLORIDE 10 MG: 5 SOLUTION ORAL at 01:55

## 2018-07-24 RX ADMIN — ACETAMINOPHEN 650 MG: 325 SOLUTION ORAL at 12:24

## 2018-07-24 RX ADMIN — PIPERACILLIN SODIUM AND TAZOBACTAM SODIUM 3.38 G: 3; .375 INJECTION, POWDER, LYOPHILIZED, FOR SOLUTION INTRAVENOUS at 04:20

## 2018-07-24 RX ADMIN — OXYCODONE HYDROCHLORIDE 10 MG: 5 SOLUTION ORAL at 20:12

## 2018-07-24 RX ADMIN — SODIUM CHLORIDE SOLN NEBU 3% 3 ML: 3 NEBU SOLN at 16:40

## 2018-07-24 RX ADMIN — OXYCODONE HYDROCHLORIDE 10 MG: 5 SOLUTION ORAL at 15:09

## 2018-07-24 RX ADMIN — PIPERACILLIN SODIUM AND TAZOBACTAM SODIUM 3.38 G: 3; .375 INJECTION, POWDER, LYOPHILIZED, FOR SOLUTION INTRAVENOUS at 11:00

## 2018-07-24 RX ADMIN — PANTOPRAZOLE SODIUM 40 MG: 40 TABLET, DELAYED RELEASE ORAL at 08:48

## 2018-07-24 RX ADMIN — ACETAMINOPHEN 650 MG: 325 SOLUTION ORAL at 23:34

## 2018-07-24 RX ADMIN — PIPERACILLIN SODIUM AND TAZOBACTAM SODIUM 3.38 G: 3; .375 INJECTION, POWDER, LYOPHILIZED, FOR SOLUTION INTRAVENOUS at 17:15

## 2018-07-24 RX ADMIN — SODIUM PHOSPHATE, MONOBASIC, MONOHYDRATE AND SODIUM PHOSPHATE, DIBASIC, ANHYDROUS 15 MMOL: 276; 142 INJECTION, SOLUTION INTRAVENOUS at 22:43

## 2018-07-24 RX ADMIN — HEPARIN SODIUM 5000 UNITS: 5000 INJECTION, SOLUTION INTRAVENOUS; SUBCUTANEOUS at 15:09

## 2018-07-24 RX ADMIN — POTASSIUM & SODIUM PHOSPHATES POWDER PACK 280-160-250 MG 1 PACKET: 280-160-250 PACK at 17:15

## 2018-07-24 RX ADMIN — ACETAMINOPHEN 650 MG: 325 SOLUTION ORAL at 20:15

## 2018-07-24 RX ADMIN — HEPARIN SODIUM 5000 UNITS: 5000 INJECTION, SOLUTION INTRAVENOUS; SUBCUTANEOUS at 22:15

## 2018-07-24 RX ADMIN — POTASSIUM & SODIUM PHOSPHATES POWDER PACK 280-160-250 MG 1 PACKET: 280-160-250 PACK at 12:24

## 2018-07-24 RX ADMIN — HEPARIN SODIUM 5000 UNITS: 5000 INJECTION, SOLUTION INTRAVENOUS; SUBCUTANEOUS at 05:36

## 2018-07-24 RX ADMIN — IPRATROPIUM BROMIDE AND ALBUTEROL SULFATE 3 ML: .5; 3 SOLUTION RESPIRATORY (INHALATION) at 16:40

## 2018-07-24 RX ADMIN — PIPERACILLIN SODIUM AND TAZOBACTAM SODIUM 3.38 G: 3; .375 INJECTION, POWDER, LYOPHILIZED, FOR SOLUTION INTRAVENOUS at 22:08

## 2018-07-24 RX ADMIN — IPRATROPIUM BROMIDE AND ALBUTEROL SULFATE 3 ML: .5; 3 SOLUTION RESPIRATORY (INHALATION) at 20:29

## 2018-07-24 RX ADMIN — OXYCODONE HYDROCHLORIDE 10 MG: 5 SOLUTION ORAL at 08:48

## 2018-07-24 RX ADMIN — ACETAMINOPHEN 650 MG: 325 SOLUTION ORAL at 01:55

## 2018-07-24 RX ADMIN — DEXTROSE AND SODIUM CHLORIDE: 5; 450 INJECTION, SOLUTION INTRAVENOUS at 23:36

## 2018-07-24 RX ADMIN — ACETAMINOPHEN 650 MG: 325 SOLUTION ORAL at 08:48

## 2018-07-24 RX ADMIN — SODIUM CHLORIDE SOLN NEBU 3% 3 ML: 3 NEBU SOLN at 20:29

## 2018-07-24 RX ADMIN — FLUCONAZOLE IN SODIUM CHLORIDE 400 MG: 2 INJECTION, SOLUTION INTRAVENOUS at 03:05

## 2018-07-24 RX ADMIN — MULTIVITAMIN 15 ML: LIQUID ORAL at 08:48

## 2018-07-24 RX ADMIN — IPRATROPIUM BROMIDE AND ALBUTEROL SULFATE 3 ML: .5; 3 SOLUTION RESPIRATORY (INHALATION) at 08:58

## 2018-07-24 RX ADMIN — POTASSIUM & SODIUM PHOSPHATES POWDER PACK 280-160-250 MG 1 PACKET: 280-160-250 PACK at 20:15

## 2018-07-24 RX ADMIN — ACETAMINOPHEN 650 MG: 325 SOLUTION ORAL at 17:15

## 2018-07-24 RX ADMIN — SODIUM PHOSPHATE, MONOBASIC, MONOHYDRATE AND SODIUM PHOSPHATE, DIBASIC, ANHYDROUS 20 MMOL: 276; 142 INJECTION, SOLUTION INTRAVENOUS at 11:00

## 2018-07-24 RX ADMIN — SODIUM CHLORIDE SOLN NEBU 3% 3 ML: 3 NEBU SOLN at 08:58

## 2018-07-24 RX ADMIN — POTASSIUM & SODIUM PHOSPHATES POWDER PACK 280-160-250 MG 1 PACKET: 280-160-250 PACK at 08:48

## 2018-07-24 RX ADMIN — SODIUM PHOSPHATE, MONOBASIC, MONOHYDRATE AND SODIUM PHOSPHATE, DIBASIC, ANHYDROUS 20 MMOL: 276; 142 INJECTION, SOLUTION INTRAVENOUS at 00:47

## 2018-07-24 ASSESSMENT — ACTIVITIES OF DAILY LIVING (ADL)
ADLS_ACUITY_SCORE: 11
ADLS_ACUITY_SCORE: 9
ADLS_ACUITY_SCORE: 11
ADLS_ACUITY_SCORE: 11

## 2018-07-24 ASSESSMENT — PAIN DESCRIPTION - DESCRIPTORS
DESCRIPTORS: ACHING
DESCRIPTORS: ACHING;CONSTANT
DESCRIPTORS: ACHING
DESCRIPTORS: ACHING
DESCRIPTORS: DULL;DISCOMFORT
DESCRIPTORS: CONSTANT;ACHING

## 2018-07-24 NOTE — PROGRESS NOTES
"Thoracic Surgery Progress Note  07/24/2018    Subjective:  NAEON. Right CT removed yesterday, postpull CXR reassuring. Transferred to floor yesterday.    Objective:  /80  Pulse 100  Temp 99  F (37.2  C) (Oral)  Resp 16  Ht 1.676 m (5' 6\")  Wt 90 kg (198 lb 6.6 oz)  SpO2 97%  BMI 32.02 kg/m2    I/O last 3 completed shifts:  In: 2757.3 [I.V.:1937.3; NG/GT:370]  Out: 905 [Urine:550; Emesis/NG output:100; Chest Tube:255]    A&Ox3, NAD  Breathing non-labored on low flow nasal canula  Incisions C/D/I  Left CT with serosang output, no air leak.  RRR per tele  Soft, NDNT, GJ tube in place  Distal extremities warm    Assessment/Plan:  37M now POD 2 s/p esophageal perforation s/p emergent EGD, PEGJ placement, left thoracotomy with primary esophageal repair and intercostal muscle flap reinforcement, esophageal stent, bilateral chest tubes.  Recovering well postop.    - Continue NPO  - Pulm toilet  - Continue antibiosis  - Advance TFs per J port  - SQH ppx    Esau Emerson MD  PGY-3 General Surgery      "

## 2018-07-24 NOTE — PLAN OF CARE
Problem: Patient Care Overview  Goal: Plan of Care/Patient Progress Review  Outcome: No Change  D: Esophageal perforation    I: Monitored vitals and assessed pt status.   Running: D5 1/2NS at 75 ml/hr.  PRN: Oxycodone    A: A0x4. VSS, on 3 liters O2. NSR. Afebrile. Pain controlled with oxycodone. NG to straight drainage. NJ at 25 ml/hr. Not to be increased until phos> 1.4.  Na Phos given. Recheck at 0700. Pt up to bathroom with SBA, forgets to save urine. No BM. Is not passing gas. CTs on left to water seal.     I/O this shift:  In: 977.5 [I.V.:737.5; NG/GT:90]  Out: 50 [Emesis/NG output:50]    Temp:  [98.2  F (36.8  C)-99.1  F (37.3  C)] 98.8  F (37.1  C)  Pulse:  [100] 100  Heart Rate:  [] 86  Resp:  [16-23] 16  BP: (115-141)/(76-89) 126/83  SpO2:  [94 %-99 %] 99 %      P: Continue to monitor Pt status and report changes to treatment team.

## 2018-07-24 NOTE — CONSULTS
Brief  Note:    Consult for chemical dependency. Per Psychiatry note, no evidence of significant drug use problems, Utox positive for methamphetmine likely due to taking Bronkaid pre-workout supplement. Will clear SW consult.    JESS Paredes, [facility]  6B Intermediate Care Unit  Phone: [phone]  Pager: [phone]

## 2018-07-24 NOTE — PLAN OF CARE
Problem: Patient Care Overview  Goal: Plan of Care/Patient Progress Review  Outcome: No Change  Neuro: A&Ox4.   Cardiac: SR-ST . VSS.    Respiratory: Sating >92% on 2L 02.  Reports minor CLOUD.  X2 CT to L lateral chest with 20 ml of serous drainage from 4532-0073.  On water seal, no leak noted.  Dressing around L chest tube changed, dressing with small amount of serous drainage.     GI/: Adequate urine output. BM X1.  Refused suppository as he had large BM.    Diet/appetite: NPO with TF at 25 mL/hr.  Phos recheck in process, if phose greater than 1.9 will increase phos to 35 ml/hr.  Increase rate 10 ml/8 hrs until goal of 55 is met.    Activity:  Stand by assist, up to chair and in halls.  Pain: At acceptable level on 10 mg oxycodone q4-6 hrs  Skin: No new deficits noted.  Dressing changed to L lateral/back incision.  Area c/d/i.   LDA's:  X2 L lateral CT.  X2 L forearm PIV.  Calling vascular to assess upper L forearm PIV, reporting pain, looks good.      Plan: Continue POC. Update MD with any changes.

## 2018-07-24 NOTE — PLAN OF CARE
Problem: Patient Care Overview  Goal: Plan of Care/Patient Progress Review  Outcome: No Change  Neuro: A&Ox4.   Cardiac: SR-ST 80-90. VSS.   Respiratory: Sating 100% on 1L, requests 1 L O2 for comfort. C/o feeling short of breath. 2 L chest tubes Y'd together to water seal with adequate output.   GI/: Adequate urine output  Via urinal. No BM.  Diet/appetite: NPO. TF running at 25mL/hr via jejunostomy. Gtube to gravity with dark green/black drainage.  Activity:  Assist of 1, up to chair and in halls.  Pain: At acceptable level on current regimen.   Skin: No new deficits noted. Dressings CDI.  LDA's: PIV x1, w/ TKO and D51/2NS @75mL/hr    Plan: Continue with POC. Notify primary team with changes.

## 2018-07-24 NOTE — PLAN OF CARE
Problem: Patient Care Overview  Goal: Plan of Care/Patient Progress Review  OT 6B: HOLD, per chart review and discussion with pt, pt may only need one therapy discipline to be following during IP stay. Will check in with physical therapy to determine if pt has IP OT needs.

## 2018-07-25 ENCOUNTER — APPOINTMENT (OUTPATIENT)
Dept: GENERAL RADIOLOGY | Facility: CLINIC | Age: 38
End: 2018-07-25
Attending: THORACIC SURGERY (CARDIOTHORACIC VASCULAR SURGERY)
Payer: COMMERCIAL

## 2018-07-25 ENCOUNTER — APPOINTMENT (OUTPATIENT)
Dept: PHYSICAL THERAPY | Facility: CLINIC | Age: 38
End: 2018-07-25
Attending: THORACIC SURGERY (CARDIOTHORACIC VASCULAR SURGERY)
Payer: COMMERCIAL

## 2018-07-25 LAB
ANION GAP SERPL CALCULATED.3IONS-SCNC: 8 MMOL/L (ref 3–14)
BUN SERPL-MCNC: 10 MG/DL (ref 7–30)
CALCIUM SERPL-MCNC: 8.7 MG/DL (ref 8.5–10.1)
CHLORIDE SERPL-SCNC: 103 MMOL/L (ref 94–109)
CO2 SERPL-SCNC: 29 MMOL/L (ref 20–32)
CREAT SERPL-MCNC: 0.76 MG/DL (ref 0.66–1.25)
GFR SERPL CREATININE-BSD FRML MDRD: >90 ML/MIN/1.7M2
GLUCOSE BLDC GLUCOMTR-MCNC: 141 MG/DL (ref 70–99)
GLUCOSE BLDC GLUCOMTR-MCNC: 153 MG/DL (ref 70–99)
GLUCOSE BLDC GLUCOMTR-MCNC: 153 MG/DL (ref 70–99)
GLUCOSE BLDC GLUCOMTR-MCNC: 162 MG/DL (ref 70–99)
GLUCOSE BLDC GLUCOMTR-MCNC: 180 MG/DL (ref 70–99)
GLUCOSE SERPL-MCNC: 151 MG/DL (ref 70–99)
PHOSPHATE SERPL-MCNC: 2.4 MG/DL (ref 2.5–4.5)
PHOSPHATE SERPL-MCNC: 2.4 MG/DL (ref 2.5–4.5)
POTASSIUM SERPL-SCNC: 3.3 MMOL/L (ref 3.4–5.3)
POTASSIUM SERPL-SCNC: 3.5 MMOL/L (ref 3.4–5.3)
SODIUM SERPL-SCNC: 140 MMOL/L (ref 133–144)

## 2018-07-25 PROCEDURE — 36415 COLL VENOUS BLD VENIPUNCTURE: CPT | Performed by: THORACIC SURGERY (CARDIOTHORACIC VASCULAR SURGERY)

## 2018-07-25 PROCEDURE — 36415 COLL VENOUS BLD VENIPUNCTURE: CPT | Performed by: STUDENT IN AN ORGANIZED HEALTH CARE EDUCATION/TRAINING PROGRAM

## 2018-07-25 PROCEDURE — 94640 AIRWAY INHALATION TREATMENT: CPT

## 2018-07-25 PROCEDURE — 25000132 ZZH RX MED GY IP 250 OP 250 PS 637: Performed by: STUDENT IN AN ORGANIZED HEALTH CARE EDUCATION/TRAINING PROGRAM

## 2018-07-25 PROCEDURE — 25000125 ZZHC RX 250: Performed by: SURGERY

## 2018-07-25 PROCEDURE — 27210429 ZZH NUTRITION PRODUCT INTERMEDIATE LITER

## 2018-07-25 PROCEDURE — 25000125 ZZHC RX 250: Performed by: PHYSICIAN ASSISTANT

## 2018-07-25 PROCEDURE — 84100 ASSAY OF PHOSPHORUS: CPT | Performed by: STUDENT IN AN ORGANIZED HEALTH CARE EDUCATION/TRAINING PROGRAM

## 2018-07-25 PROCEDURE — 25000128 H RX IP 250 OP 636: Performed by: STUDENT IN AN ORGANIZED HEALTH CARE EDUCATION/TRAINING PROGRAM

## 2018-07-25 PROCEDURE — 84132 ASSAY OF SERUM POTASSIUM: CPT | Performed by: THORACIC SURGERY (CARDIOTHORACIC VASCULAR SURGERY)

## 2018-07-25 PROCEDURE — 94640 AIRWAY INHALATION TREATMENT: CPT | Mod: 76

## 2018-07-25 PROCEDURE — 25000128 H RX IP 250 OP 636: Performed by: THORACIC SURGERY (CARDIOTHORACIC VASCULAR SURGERY)

## 2018-07-25 PROCEDURE — 12000006 ZZH R&B IMCU INTERMEDIATE UMMC

## 2018-07-25 PROCEDURE — 00000146 ZZHCL STATISTIC GLUCOSE BY METER IP

## 2018-07-25 PROCEDURE — 71046 X-RAY EXAM CHEST 2 VIEWS: CPT

## 2018-07-25 PROCEDURE — 80048 BASIC METABOLIC PNL TOTAL CA: CPT | Performed by: STUDENT IN AN ORGANIZED HEALTH CARE EDUCATION/TRAINING PROGRAM

## 2018-07-25 PROCEDURE — 40000193 ZZH STATISTIC PT WARD VISIT

## 2018-07-25 PROCEDURE — 97530 THERAPEUTIC ACTIVITIES: CPT | Mod: GP

## 2018-07-25 PROCEDURE — 25000132 ZZH RX MED GY IP 250 OP 250 PS 637: Performed by: THORACIC SURGERY (CARDIOTHORACIC VASCULAR SURGERY)

## 2018-07-25 PROCEDURE — 94799 UNLISTED PULMONARY SVC/PX: CPT

## 2018-07-25 PROCEDURE — 84100 ASSAY OF PHOSPHORUS: CPT | Performed by: THORACIC SURGERY (CARDIOTHORACIC VASCULAR SURGERY)

## 2018-07-25 PROCEDURE — 97110 THERAPEUTIC EXERCISES: CPT | Mod: GP

## 2018-07-25 PROCEDURE — 25000132 ZZH RX MED GY IP 250 OP 250 PS 637: Performed by: SURGERY

## 2018-07-25 PROCEDURE — 97116 GAIT TRAINING THERAPY: CPT | Mod: GP

## 2018-07-25 PROCEDURE — 25000128 H RX IP 250 OP 636: Performed by: SURGERY

## 2018-07-25 PROCEDURE — 40000894 ZZH STATISTIC OT IP EVAL DEFER

## 2018-07-25 PROCEDURE — 40000275 ZZH STATISTIC RCP TIME EA 10 MIN

## 2018-07-25 PROCEDURE — 25000132 ZZH RX MED GY IP 250 OP 250 PS 637: Performed by: PHYSICIAN ASSISTANT

## 2018-07-25 RX ORDER — FUROSEMIDE 10 MG/ML
10 INJECTION INTRAMUSCULAR; INTRAVENOUS ONCE
Status: COMPLETED | OUTPATIENT
Start: 2018-07-25 | End: 2018-07-25

## 2018-07-25 RX ADMIN — PIPERACILLIN SODIUM AND TAZOBACTAM SODIUM 3.38 G: 3; .375 INJECTION, POWDER, LYOPHILIZED, FOR SOLUTION INTRAVENOUS at 10:32

## 2018-07-25 RX ADMIN — OXYCODONE HYDROCHLORIDE 10 MG: 5 SOLUTION ORAL at 14:15

## 2018-07-25 RX ADMIN — IPRATROPIUM BROMIDE AND ALBUTEROL SULFATE 3 ML: .5; 3 SOLUTION RESPIRATORY (INHALATION) at 00:46

## 2018-07-25 RX ADMIN — PANTOPRAZOLE SODIUM 40 MG: 40 TABLET, DELAYED RELEASE ORAL at 08:26

## 2018-07-25 RX ADMIN — PIPERACILLIN SODIUM AND TAZOBACTAM SODIUM 3.38 G: 3; .375 INJECTION, POWDER, LYOPHILIZED, FOR SOLUTION INTRAVENOUS at 22:00

## 2018-07-25 RX ADMIN — PIPERACILLIN SODIUM AND TAZOBACTAM SODIUM 3.38 G: 3; .375 INJECTION, POWDER, LYOPHILIZED, FOR SOLUTION INTRAVENOUS at 16:40

## 2018-07-25 RX ADMIN — HEPARIN SODIUM 5000 UNITS: 5000 INJECTION, SOLUTION INTRAVENOUS; SUBCUTANEOUS at 05:26

## 2018-07-25 RX ADMIN — FUROSEMIDE 10 MG: 10 INJECTION, SOLUTION INTRAVENOUS at 08:25

## 2018-07-25 RX ADMIN — PIPERACILLIN SODIUM AND TAZOBACTAM SODIUM 3.38 G: 3; .375 INJECTION, POWDER, LYOPHILIZED, FOR SOLUTION INTRAVENOUS at 04:17

## 2018-07-25 RX ADMIN — OXYCODONE HYDROCHLORIDE 10 MG: 5 SOLUTION ORAL at 20:21

## 2018-07-25 RX ADMIN — ACETAMINOPHEN 325 MG: 325 SOLUTION ORAL at 23:20

## 2018-07-25 RX ADMIN — FLUCONAZOLE IN SODIUM CHLORIDE 400 MG: 2 INJECTION, SOLUTION INTRAVENOUS at 03:09

## 2018-07-25 RX ADMIN — SODIUM CHLORIDE SOLN NEBU 3% 3 ML: 3 NEBU SOLN at 16:52

## 2018-07-25 RX ADMIN — SODIUM CHLORIDE SOLN NEBU 3% 3 ML: 3 NEBU SOLN at 20:28

## 2018-07-25 RX ADMIN — SODIUM PHOSPHATE, MONOBASIC, MONOHYDRATE AND SODIUM PHOSPHATE, DIBASIC, ANHYDROUS 15 MMOL: 276; 142 INJECTION, SOLUTION INTRAVENOUS at 10:32

## 2018-07-25 RX ADMIN — SODIUM CHLORIDE SOLN NEBU 3% 3 ML: 3 NEBU SOLN at 12:46

## 2018-07-25 RX ADMIN — OXYCODONE HYDROCHLORIDE 10 MG: 5 SOLUTION ORAL at 08:25

## 2018-07-25 RX ADMIN — MULTIVITAMIN 15 ML: LIQUID ORAL at 08:26

## 2018-07-25 RX ADMIN — IPRATROPIUM BROMIDE AND ALBUTEROL SULFATE 3 ML: .5; 3 SOLUTION RESPIRATORY (INHALATION) at 16:52

## 2018-07-25 RX ADMIN — ACETAMINOPHEN 325 MG: 325 SOLUTION ORAL at 20:17

## 2018-07-25 RX ADMIN — IPRATROPIUM BROMIDE AND ALBUTEROL SULFATE 3 ML: .5; 3 SOLUTION RESPIRATORY (INHALATION) at 20:28

## 2018-07-25 RX ADMIN — ENOXAPARIN SODIUM 40 MG: 100 INJECTION SUBCUTANEOUS at 08:26

## 2018-07-25 RX ADMIN — IPRATROPIUM BROMIDE AND ALBUTEROL SULFATE 3 ML: .5; 3 SOLUTION RESPIRATORY (INHALATION) at 12:46

## 2018-07-25 RX ADMIN — ACETAMINOPHEN 325 MG: 325 SOLUTION ORAL at 11:08

## 2018-07-25 RX ADMIN — SODIUM CHLORIDE SOLN NEBU 3% 3 ML: 3 NEBU SOLN at 00:46

## 2018-07-25 RX ADMIN — OXYCODONE HYDROCHLORIDE 10 MG: 5 SOLUTION ORAL at 03:15

## 2018-07-25 RX ADMIN — POTASSIUM CHLORIDE 40 MEQ: 1.5 POWDER, FOR SOLUTION ORAL at 08:26

## 2018-07-25 ASSESSMENT — ACTIVITIES OF DAILY LIVING (ADL)
ADLS_ACUITY_SCORE: 11

## 2018-07-25 ASSESSMENT — PAIN DESCRIPTION - DESCRIPTORS: DESCRIPTORS: ACHING;CONSTANT

## 2018-07-25 NOTE — PLAN OF CARE
Problem: Patient Care Overview  Goal: Plan of Care/Patient Progress Review  PT / 6B -     Discharge Planner PT   Patient plan for discharge: home with PRN assist  Current status: Performing sit->stand transfer without assistance.  Demonstrating improved gait pattern this day, ambulating ~ 150' with no AD and no overt loss of balance.  Climbed 3 stairs x 2 once with UE support and once without; no additional physical assistance.  Performed thoracotomy exercises.  Barriers to return to prior living situation: higher level balance/endurance  Recommendations for discharge: home with PRN assistance + independent home exercise program  Rationale for recommendations: Pt currently performing all functional mobility (transfers, gait, stairs) without additional assistance, demonstrating improved activity tolerance and gait pattern this day.  Pt anticipates with further therapy instruction, he will be able to improve L UE ROM and return to prior level of independence independently.        Entered by: Kate Armstrong 07/25/2018 10:59 AM

## 2018-07-25 NOTE — PLAN OF CARE
Problem: Patient Care Overview  Goal: Plan of Care/Patient Progress Review  Outcome: No Change  Temp: 99  F (37.2  C) Temp src: Oral BP: 140/78   Heart Rate: 92 Resp: 16 SpO2: 98 % O2 Device: Nasal cannula Oxygen Delivery: 2 LPM  Neuro: A&Ox4. Afebrile.   Cardiac: NSR, 90s. VSS.   Resp: On 2L NC. Lung sounds clear/diminished in bases.   GI/: Adequate UOP. Small BM this shift.   Diet/Appetite: NPO. TF via J tube advanced to 45 ml/hr with 30 ml FWF q4h. Next advancement at 1100.   Skin: L-incision c/d/i.   Access: L-PIV x2. D5 1/2 NS infusing at 75ml/hr.   Drains: G-tube to gravity. L-CT x2 y'd with ~20cc output.   Activity: Up SBA.   Pain: C/o incisional pain, Oxycodone given x1 this shift.   Labs: Phos 2.0-replaced, next recheck this am.     Will continue with plan of care and notify MD of any changes.

## 2018-07-25 NOTE — PLAN OF CARE
Problem: Patient Care Overview  Goal: Plan of Care/Patient Progress Review  Outcome: No Change  Neuro: A&Ox4.   Cardiac: SR 90's. VSS.                       Respiratory: Sating >92% on 1L 02.  A couple hours of RA at rest, started to drop into high 80's, back on 1L.  Independently using IS.  Reports minor CLOUD.  X2 CT to L lateral chest with 10 ml of serous drainage from 4650-8481.  On water seal, no leak noted.  Dressing around L chest tube changed, dressing with small amount of serous drainage.     GI/: Adequate urine output. BM X1.  Lasix 10 mg given once with good output.     Diet/appetite: NPO with TF at 55 mL/hr.    Activity:  Stand by assist, up to chair and in halls.  Pain: At acceptable level on 10 mg oxycodone q4-6 hrs  Skin: No new deficits noted.  L lateral/back incision JER, approximated with dermabond.  Area c/d/i.   LDA's:  X2 L lateral CT.  X2 L forearm PIV.       Plan: Continue POC. Update MD with any changes.  PLC set up for TF education on Sunday at 1230.  Esophagram on Monday.

## 2018-07-25 NOTE — PROGRESS NOTES
Care Coordinator Progress Note    Admission Date/Time:  7/21/2018  Attending MD:  Dennis Miranda*    Data  Chart reviewed, discussed with interdisciplinary team.   Patient was admitted for: Esophageal perforation    Concerns with insurance coverage for discharge needs: None identified  Current Living Situation: Patient lives with his Parents  Support System: Parents involved and supportive  Services Involved:   Transportation at Discharge: Family/Friend  Transportation to Medical Appointments:  Barriers to Discharge: None identified    Coordination of Care and Referrals: Provided patient/family with options for Home Infusion      Assessment  Pt with esophageal perforation, s/p GJ tube placement, esophageal stent, repair of esophageal perforation and intercostal flap reinforcement on 7/21/18. Pt currently has chest tubes, and receiving tube feeding per J tube, esophagram scheduled for 7/30/18. Per Thoracic Surgery, Pt will require tube feedings post discharge.  I have met with Pt to introduce myself and care coordinator role. Prior to admission Pt was independent, working and living with his Parents in [facility].  I have discussed the process for tube feeding administration and offered choice of Home Infusion Agencies, Pt prefers [facility].  I have made a referral to [facility] #[phone] and scheduled and PLC appointment for TF teaching on 7/29/18 @ 12:30pm.     Plan  Anticipated Discharge Date:  5-6 days  Anticipated Discharge Plan:  Discharge to home with Tube feeding support provided by [facility]    Annelise Linton RN   6B care coordinator #[phone]

## 2018-07-25 NOTE — PROGRESS NOTES
Thoracic Surgery Progress Note  07/25/2018     Subjective:  NAEON. Patient recovering well.      Objective:  Temp: 99  F (37.2  C) Temp src: Oral BP: 140/78   Heart Rate: 92 Resp: 16 SpO2: 98 % O2 Device: Nasal cannula Oxygen Delivery: 2 LPM     Alert, oriented, and in NAD  Breathing comfortably on 2L NC  Incisions C/D/I  RRR   Soft, NDNT, GJ tube in place  L CT w/ serosanguinous output      Assessment/Plan:  37M now POD4 s/p esophageal perforation s/p emergent EGD, PEGJ placement, left thoracotomy with primary esophageal repair and intercostal muscle flap reinforcement, esophageal stent, bilateral chest tubes.  Recovering well postop.     - F/U CXR, BMP  - Esophagram for 7/30/18  - 10mg lasix given, TKO fluids, follow UO  - Continue NPO  - Pulm toilet  - Continue antibiosis  - Advance TFs per J port  - Lovenox ppx    Sunny Bowling MD  General Surgery PGY-1

## 2018-07-25 NOTE — PLAN OF CARE
Problem: Patient Care Overview  Goal: Plan of Care/Patient Progress Review  OT 6B: Defer - OT orders received and acknowledged. Per discussion with PT, pt not with acute OT needs. Pt limited by pain/post op precautions and completing ADLs mod I. PT to follow to address decreased activity tolerance for ADLs and to educate on thoracotomy precautions/ROM exercises. Will complete orders. Please re-consult if change in status.

## 2018-07-26 ENCOUNTER — APPOINTMENT (OUTPATIENT)
Dept: PHYSICAL THERAPY | Facility: CLINIC | Age: 38
End: 2018-07-26
Attending: THORACIC SURGERY (CARDIOTHORACIC VASCULAR SURGERY)
Payer: COMMERCIAL

## 2018-07-26 ENCOUNTER — APPOINTMENT (OUTPATIENT)
Dept: GENERAL RADIOLOGY | Facility: CLINIC | Age: 38
End: 2018-07-26
Attending: STUDENT IN AN ORGANIZED HEALTH CARE EDUCATION/TRAINING PROGRAM
Payer: COMMERCIAL

## 2018-07-26 LAB
ANION GAP SERPL CALCULATED.3IONS-SCNC: 5 MMOL/L (ref 3–14)
BUN SERPL-MCNC: 12 MG/DL (ref 7–30)
CALCIUM SERPL-MCNC: 8.7 MG/DL (ref 8.5–10.1)
CHLORIDE SERPL-SCNC: 106 MMOL/L (ref 94–109)
CO2 SERPL-SCNC: 27 MMOL/L (ref 20–32)
CREAT SERPL-MCNC: 0.82 MG/DL (ref 0.66–1.25)
ERYTHROCYTE [DISTWIDTH] IN BLOOD BY AUTOMATED COUNT: 14.1 % (ref 10–15)
GFR SERPL CREATININE-BSD FRML MDRD: >90 ML/MIN/1.7M2
GLUCOSE BLDC GLUCOMTR-MCNC: 138 MG/DL (ref 70–99)
GLUCOSE BLDC GLUCOMTR-MCNC: 150 MG/DL (ref 70–99)
GLUCOSE BLDC GLUCOMTR-MCNC: 169 MG/DL (ref 70–99)
GLUCOSE BLDC GLUCOMTR-MCNC: 179 MG/DL (ref 70–99)
GLUCOSE BLDC GLUCOMTR-MCNC: 188 MG/DL (ref 70–99)
GLUCOSE SERPL-MCNC: 157 MG/DL (ref 70–99)
HCT VFR BLD AUTO: 36 % (ref 40–53)
HGB BLD-MCNC: 12.5 G/DL (ref 13.3–17.7)
MCH RBC QN AUTO: 29.3 PG (ref 26.5–33)
MCHC RBC AUTO-ENTMCNC: 34.7 G/DL (ref 31.5–36.5)
MCV RBC AUTO: 84 FL (ref 78–100)
PHOSPHATE SERPL-MCNC: 2.8 MG/DL (ref 2.5–4.5)
PLATELET # BLD AUTO: 222 10E9/L (ref 150–450)
POTASSIUM SERPL-SCNC: 3.6 MMOL/L (ref 3.4–5.3)
RBC # BLD AUTO: 4.27 10E12/L (ref 4.4–5.9)
SODIUM SERPL-SCNC: 138 MMOL/L (ref 133–144)
WBC # BLD AUTO: 9.4 10E9/L (ref 4–11)

## 2018-07-26 PROCEDURE — 97530 THERAPEUTIC ACTIVITIES: CPT | Mod: GP

## 2018-07-26 PROCEDURE — 84100 ASSAY OF PHOSPHORUS: CPT | Performed by: STUDENT IN AN ORGANIZED HEALTH CARE EDUCATION/TRAINING PROGRAM

## 2018-07-26 PROCEDURE — 25000128 H RX IP 250 OP 636: Performed by: STUDENT IN AN ORGANIZED HEALTH CARE EDUCATION/TRAINING PROGRAM

## 2018-07-26 PROCEDURE — 25000128 H RX IP 250 OP 636: Performed by: SURGERY

## 2018-07-26 PROCEDURE — 94799 UNLISTED PULMONARY SVC/PX: CPT

## 2018-07-26 PROCEDURE — 25000128 H RX IP 250 OP 636: Performed by: THORACIC SURGERY (CARDIOTHORACIC VASCULAR SURGERY)

## 2018-07-26 PROCEDURE — 25000132 ZZH RX MED GY IP 250 OP 250 PS 637: Performed by: THORACIC SURGERY (CARDIOTHORACIC VASCULAR SURGERY)

## 2018-07-26 PROCEDURE — 94640 AIRWAY INHALATION TREATMENT: CPT

## 2018-07-26 PROCEDURE — 25000132 ZZH RX MED GY IP 250 OP 250 PS 637: Performed by: PHYSICIAN ASSISTANT

## 2018-07-26 PROCEDURE — 25000125 ZZHC RX 250: Performed by: PHYSICIAN ASSISTANT

## 2018-07-26 PROCEDURE — 25000132 ZZH RX MED GY IP 250 OP 250 PS 637: Performed by: STUDENT IN AN ORGANIZED HEALTH CARE EDUCATION/TRAINING PROGRAM

## 2018-07-26 PROCEDURE — 40000193 ZZH STATISTIC PT WARD VISIT

## 2018-07-26 PROCEDURE — 36415 COLL VENOUS BLD VENIPUNCTURE: CPT | Performed by: STUDENT IN AN ORGANIZED HEALTH CARE EDUCATION/TRAINING PROGRAM

## 2018-07-26 PROCEDURE — 27210429 ZZH NUTRITION PRODUCT INTERMEDIATE LITER

## 2018-07-26 PROCEDURE — 40000275 ZZH STATISTIC RCP TIME EA 10 MIN

## 2018-07-26 PROCEDURE — 71046 X-RAY EXAM CHEST 2 VIEWS: CPT

## 2018-07-26 PROCEDURE — 25000128 H RX IP 250 OP 636: Performed by: NURSE PRACTITIONER

## 2018-07-26 PROCEDURE — 94640 AIRWAY INHALATION TREATMENT: CPT | Mod: 76

## 2018-07-26 PROCEDURE — 00000146 ZZHCL STATISTIC GLUCOSE BY METER IP

## 2018-07-26 PROCEDURE — 85027 COMPLETE CBC AUTOMATED: CPT | Performed by: STUDENT IN AN ORGANIZED HEALTH CARE EDUCATION/TRAINING PROGRAM

## 2018-07-26 PROCEDURE — 25800025 ZZH RX 258: Performed by: STUDENT IN AN ORGANIZED HEALTH CARE EDUCATION/TRAINING PROGRAM

## 2018-07-26 PROCEDURE — 12000008 ZZH R&B INTERMEDIATE UMMC

## 2018-07-26 PROCEDURE — 80048 BASIC METABOLIC PNL TOTAL CA: CPT | Performed by: STUDENT IN AN ORGANIZED HEALTH CARE EDUCATION/TRAINING PROGRAM

## 2018-07-26 RX ORDER — FUROSEMIDE 10 MG/ML
20 INJECTION INTRAMUSCULAR; INTRAVENOUS ONCE
Status: COMPLETED | OUTPATIENT
Start: 2018-07-26 | End: 2018-07-26

## 2018-07-26 RX ADMIN — IPRATROPIUM BROMIDE AND ALBUTEROL SULFATE 3 ML: .5; 3 SOLUTION RESPIRATORY (INHALATION) at 12:52

## 2018-07-26 RX ADMIN — OXYCODONE HYDROCHLORIDE 10 MG: 5 SOLUTION ORAL at 14:53

## 2018-07-26 RX ADMIN — PANTOPRAZOLE SODIUM 40 MG: 40 TABLET, DELAYED RELEASE ORAL at 09:36

## 2018-07-26 RX ADMIN — OXYCODONE HYDROCHLORIDE 10 MG: 5 SOLUTION ORAL at 04:35

## 2018-07-26 RX ADMIN — ACETAMINOPHEN 650 MG: 325 SOLUTION ORAL at 23:42

## 2018-07-26 RX ADMIN — DEXTROSE AND SODIUM CHLORIDE: 5; 450 INJECTION, SOLUTION INTRAVENOUS at 04:35

## 2018-07-26 RX ADMIN — PIPERACILLIN SODIUM AND TAZOBACTAM SODIUM 3.38 G: 3; .375 INJECTION, POWDER, LYOPHILIZED, FOR SOLUTION INTRAVENOUS at 22:35

## 2018-07-26 RX ADMIN — FLUCONAZOLE IN SODIUM CHLORIDE 400 MG: 2 INJECTION, SOLUTION INTRAVENOUS at 02:50

## 2018-07-26 RX ADMIN — PIPERACILLIN SODIUM AND TAZOBACTAM SODIUM 3.38 G: 3; .375 INJECTION, POWDER, LYOPHILIZED, FOR SOLUTION INTRAVENOUS at 09:37

## 2018-07-26 RX ADMIN — ACETAMINOPHEN 325 MG: 325 SOLUTION ORAL at 19:30

## 2018-07-26 RX ADMIN — ENOXAPARIN SODIUM 40 MG: 100 INJECTION SUBCUTANEOUS at 09:36

## 2018-07-26 RX ADMIN — FUROSEMIDE 20 MG: 10 INJECTION, SOLUTION INTRAVENOUS at 09:36

## 2018-07-26 RX ADMIN — OXYCODONE HYDROCHLORIDE 10 MG: 5 SOLUTION ORAL at 09:36

## 2018-07-26 RX ADMIN — PIPERACILLIN SODIUM AND TAZOBACTAM SODIUM 3.38 G: 3; .375 INJECTION, POWDER, LYOPHILIZED, FOR SOLUTION INTRAVENOUS at 16:19

## 2018-07-26 RX ADMIN — MULTIVITAMIN 15 ML: LIQUID ORAL at 09:36

## 2018-07-26 RX ADMIN — SODIUM CHLORIDE SOLN NEBU 3% 3 ML: 3 NEBU SOLN at 15:52

## 2018-07-26 RX ADMIN — IPRATROPIUM BROMIDE AND ALBUTEROL SULFATE 3 ML: .5; 3 SOLUTION RESPIRATORY (INHALATION) at 20:49

## 2018-07-26 RX ADMIN — IPRATROPIUM BROMIDE AND ALBUTEROL SULFATE 3 ML: .5; 3 SOLUTION RESPIRATORY (INHALATION) at 15:52

## 2018-07-26 RX ADMIN — SODIUM CHLORIDE SOLN NEBU 3% 3 ML: 3 NEBU SOLN at 20:49

## 2018-07-26 RX ADMIN — SODIUM CHLORIDE SOLN NEBU 3% 3 ML: 3 NEBU SOLN at 09:21

## 2018-07-26 RX ADMIN — PIPERACILLIN SODIUM AND TAZOBACTAM SODIUM 3.38 G: 3; .375 INJECTION, POWDER, LYOPHILIZED, FOR SOLUTION INTRAVENOUS at 04:20

## 2018-07-26 RX ADMIN — OXYCODONE HYDROCHLORIDE 10 MG: 5 SOLUTION ORAL at 23:42

## 2018-07-26 RX ADMIN — ACETAMINOPHEN 325 MG: 325 SOLUTION ORAL at 09:36

## 2018-07-26 RX ADMIN — OXYCODONE HYDROCHLORIDE 10 MG: 5 SOLUTION ORAL at 00:30

## 2018-07-26 RX ADMIN — ACETAMINOPHEN 325 MG: 325 SOLUTION ORAL at 11:52

## 2018-07-26 RX ADMIN — ACETAMINOPHEN 325 MG: 325 SOLUTION ORAL at 04:22

## 2018-07-26 RX ADMIN — IPRATROPIUM BROMIDE AND ALBUTEROL SULFATE 3 ML: .5; 3 SOLUTION RESPIRATORY (INHALATION) at 09:20

## 2018-07-26 RX ADMIN — ACETAMINOPHEN 325 MG: 325 SOLUTION ORAL at 14:53

## 2018-07-26 RX ADMIN — SODIUM CHLORIDE SOLN NEBU 3% 3 ML: 3 NEBU SOLN at 12:52

## 2018-07-26 RX ADMIN — OXYCODONE HYDROCHLORIDE 10 MG: 5 SOLUTION ORAL at 19:30

## 2018-07-26 ASSESSMENT — PAIN DESCRIPTION - DESCRIPTORS
DESCRIPTORS: ACHING;CONSTANT
DESCRIPTORS: ACHING
DESCRIPTORS: ACHING
DESCRIPTORS: ACHING;CONSTANT

## 2018-07-26 ASSESSMENT — ACTIVITIES OF DAILY LIVING (ADL)
ADLS_ACUITY_SCORE: 11

## 2018-07-26 NOTE — PLAN OF CARE
Problem: Patient Care Overview  Goal: Plan of Care/Patient Progress Review  Outcome: No Change  [id number]    Neuro: A&Ox4, uses call light appropriately.   Cardiac: SR 70-90s. VSS, DBP slightly elevated.   Respiratory: Sating high 90s on 1L O2, patient feels more comfortable having it.   GI/: Adequate urine output, per urinal. BM x1.  Diet/appetite: Strict NPO. TF per J tube at goal of 55cc/hr with 30cc FWF every 4hrs.   Activity:  Up SBA, sleeping in recliner all night and ambulated in hallway.  Pain: Scheduled Tylenol, patient requested half dose all night. PRN Oxy 10mg Q 4hr.   Skin: No new deficits noted.   LDA's: G/J tube - G tube to gravity bag, J tube with TF. Left lateral CT x2 to water seal. PIV x2.    Plan: No acute changes overnight, continue with POC. Notify primary team with changes.

## 2018-07-26 NOTE — PLAN OF CARE
Problem: Patient Care Overview  Goal: Plan of Care/Patient Progress Review  PT 6B: Will sign off. Conversation/education re: continued activity in hospital and donning/doffing shirt. Pt reports no further acute PT needs at this time and is mobilizing multiple times per day with nsg assistance.    Physical Therapy Discharge Summary    Reason for therapy discharge:    All goals and outcomes met, no further needs identified.    Progress towards therapy goal(s). See goals on Care Plan in [facility] electronic health record for goal details.  Goals met    Therapy recommendation(s):    No further therapy is recommended.  Continue home exercise program.

## 2018-07-26 NOTE — PROGRESS NOTES
"Thoracic Surgery Progress Note  07/26/2018    Subjective:  NAEON. Pain improving. Feels well overall.    Objective:  BP (!) 146/96 (BP Location: Left arm)  Pulse 100  Temp 99.1  F (37.3  C) (Oral)  Resp 18  Ht 1.676 m (5' 6\")  Wt 89.1 kg (196 lb 6.4 oz)  SpO2 99%  BMI 31.7 kg/m2    I/O last 3 completed shifts:  In: 2926.5 [I.V.:1296.5; NG/GT:360]  Out: 3375 [Urine:3100; Emesis/NG output:245; Chest Tube:30]    A&Ox3, NAD  Breathing non-labored on low flow nasal canula  Incisions C/D/I  Left CTs with serosang output, no air leak.  Soft, NTND, GJ tube in place  Distal extremities warm    Assessment/Plan:  37M now s/p esophageal perforation s/p emergent EGD, PEGJ placement, left thoracotomy with primary esophageal repair and intercostal muscle flap reinforcement, esophageal stent, bilateral chest tubes on 7/21/18.  Recovering well postop.    - Will DC straight left chest tube today. Keep angled CT.  - Continue NPO  - Swallow study Monday  - Pulm toilet  - Continue antibiosis  - Advance TFs per J port  - Lasix again today  - H ppx   - Transfer to     Esau Emerson MD  PGY-3 General Surgery      "

## 2018-07-26 NOTE — PLAN OF CARE
Problem: Patient Care Overview  Goal: Plan of Care/Patient Progress Review  Outcome: No Change  Neuro: A&Ox4.   Cardiac: SR 90's. BP  133-146/ 84-96                    Respiratory: Sating >92% on RA.  Reports minimal CLOUD.  Independently using IS.  Straight CT on L lateral chest removed, angled remains.  On water seal, no leak noted.  Dressing around L chest tube changed, dressing with small amount of serous drainage.     GI/: Adequate urine output. BM X1.  Lasix 20 mg given once with good response.  Diet/appetite: NPO with TF at goal of 55 mL/hr. Dietician to look into if he would be ready for cycled feeds.  Activity: Independent with activity.  Only requiring help managing equipment.   Pain: At acceptable level on 10 mg oxycodone q4-6 hrs  Skin: No new deficits noted.  L lateral/back incision JER, approximated with dermabond.  Area c/d/i.   LDA's:  L lateral CT.  X2 L forearm PIV.        Plan: Continue POC. Update MD with any changes.  PLC set up for TF education on Sunday at 1230.  Esophagram on Monday.

## 2018-07-27 ENCOUNTER — APPOINTMENT (OUTPATIENT)
Dept: GENERAL RADIOLOGY | Facility: CLINIC | Age: 38
End: 2018-07-27
Attending: STUDENT IN AN ORGANIZED HEALTH CARE EDUCATION/TRAINING PROGRAM
Payer: COMMERCIAL

## 2018-07-27 LAB
ANION GAP SERPL CALCULATED.3IONS-SCNC: 8 MMOL/L (ref 3–14)
BUN SERPL-MCNC: 14 MG/DL (ref 7–30)
CALCIUM SERPL-MCNC: 8.8 MG/DL (ref 8.5–10.1)
CHLORIDE SERPL-SCNC: 102 MMOL/L (ref 94–109)
CO2 SERPL-SCNC: 28 MMOL/L (ref 20–32)
CREAT SERPL-MCNC: 0.85 MG/DL (ref 0.66–1.25)
GFR SERPL CREATININE-BSD FRML MDRD: >90 ML/MIN/1.7M2
GLUCOSE BLDC GLUCOMTR-MCNC: 153 MG/DL (ref 70–99)
GLUCOSE BLDC GLUCOMTR-MCNC: 155 MG/DL (ref 70–99)
GLUCOSE BLDC GLUCOMTR-MCNC: 159 MG/DL (ref 70–99)
GLUCOSE BLDC GLUCOMTR-MCNC: 161 MG/DL (ref 70–99)
GLUCOSE BLDC GLUCOMTR-MCNC: 183 MG/DL (ref 70–99)
GLUCOSE SERPL-MCNC: 153 MG/DL (ref 70–99)
MAGNESIUM SERPL-MCNC: 2.2 MG/DL (ref 1.6–2.3)
PHOSPHATE SERPL-MCNC: 3.2 MG/DL (ref 2.5–4.5)
PLATELET # BLD AUTO: 248 10E9/L (ref 150–450)
POTASSIUM SERPL-SCNC: 3.6 MMOL/L (ref 3.4–5.3)
SODIUM SERPL-SCNC: 138 MMOL/L (ref 133–144)

## 2018-07-27 PROCEDURE — 80048 BASIC METABOLIC PNL TOTAL CA: CPT | Performed by: STUDENT IN AN ORGANIZED HEALTH CARE EDUCATION/TRAINING PROGRAM

## 2018-07-27 PROCEDURE — 25000128 H RX IP 250 OP 636: Performed by: SURGERY

## 2018-07-27 PROCEDURE — 27210429 ZZH NUTRITION PRODUCT INTERMEDIATE LITER

## 2018-07-27 PROCEDURE — 25000132 ZZH RX MED GY IP 250 OP 250 PS 637: Performed by: STUDENT IN AN ORGANIZED HEALTH CARE EDUCATION/TRAINING PROGRAM

## 2018-07-27 PROCEDURE — 40000275 ZZH STATISTIC RCP TIME EA 10 MIN

## 2018-07-27 PROCEDURE — 12000008 ZZH R&B INTERMEDIATE UMMC

## 2018-07-27 PROCEDURE — 36415 COLL VENOUS BLD VENIPUNCTURE: CPT | Performed by: THORACIC SURGERY (CARDIOTHORACIC VASCULAR SURGERY)

## 2018-07-27 PROCEDURE — 83735 ASSAY OF MAGNESIUM: CPT | Performed by: STUDENT IN AN ORGANIZED HEALTH CARE EDUCATION/TRAINING PROGRAM

## 2018-07-27 PROCEDURE — 94640 AIRWAY INHALATION TREATMENT: CPT | Mod: 76

## 2018-07-27 PROCEDURE — 85049 AUTOMATED PLATELET COUNT: CPT | Performed by: THORACIC SURGERY (CARDIOTHORACIC VASCULAR SURGERY)

## 2018-07-27 PROCEDURE — 25000128 H RX IP 250 OP 636: Performed by: STUDENT IN AN ORGANIZED HEALTH CARE EDUCATION/TRAINING PROGRAM

## 2018-07-27 PROCEDURE — 84100 ASSAY OF PHOSPHORUS: CPT | Performed by: STUDENT IN AN ORGANIZED HEALTH CARE EDUCATION/TRAINING PROGRAM

## 2018-07-27 PROCEDURE — 94799 UNLISTED PULMONARY SVC/PX: CPT

## 2018-07-27 PROCEDURE — 25000132 ZZH RX MED GY IP 250 OP 250 PS 637: Performed by: THORACIC SURGERY (CARDIOTHORACIC VASCULAR SURGERY)

## 2018-07-27 PROCEDURE — 25000125 ZZHC RX 250: Performed by: PHYSICIAN ASSISTANT

## 2018-07-27 PROCEDURE — 25000132 ZZH RX MED GY IP 250 OP 250 PS 637: Performed by: PHYSICIAN ASSISTANT

## 2018-07-27 PROCEDURE — 36415 COLL VENOUS BLD VENIPUNCTURE: CPT | Performed by: STUDENT IN AN ORGANIZED HEALTH CARE EDUCATION/TRAINING PROGRAM

## 2018-07-27 PROCEDURE — 25000128 H RX IP 250 OP 636: Performed by: THORACIC SURGERY (CARDIOTHORACIC VASCULAR SURGERY)

## 2018-07-27 PROCEDURE — 00000146 ZZHCL STATISTIC GLUCOSE BY METER IP

## 2018-07-27 PROCEDURE — 94640 AIRWAY INHALATION TREATMENT: CPT

## 2018-07-27 PROCEDURE — 71046 X-RAY EXAM CHEST 2 VIEWS: CPT

## 2018-07-27 RX ORDER — FUROSEMIDE 10 MG/ML
20 INJECTION INTRAMUSCULAR; INTRAVENOUS ONCE
Status: COMPLETED | OUTPATIENT
Start: 2018-07-27 | End: 2018-07-27

## 2018-07-27 RX ADMIN — PIPERACILLIN SODIUM AND TAZOBACTAM SODIUM 3.38 G: 3; .375 INJECTION, POWDER, LYOPHILIZED, FOR SOLUTION INTRAVENOUS at 10:06

## 2018-07-27 RX ADMIN — OXYCODONE HYDROCHLORIDE 10 MG: 5 SOLUTION ORAL at 08:30

## 2018-07-27 RX ADMIN — ACETAMINOPHEN 650 MG: 325 SOLUTION ORAL at 15:59

## 2018-07-27 RX ADMIN — PIPERACILLIN SODIUM AND TAZOBACTAM SODIUM 3.38 G: 3; .375 INJECTION, POWDER, LYOPHILIZED, FOR SOLUTION INTRAVENOUS at 21:05

## 2018-07-27 RX ADMIN — ENOXAPARIN SODIUM 40 MG: 100 INJECTION SUBCUTANEOUS at 08:29

## 2018-07-27 RX ADMIN — OXYCODONE HYDROCHLORIDE 10 MG: 5 SOLUTION ORAL at 04:03

## 2018-07-27 RX ADMIN — ACETAMINOPHEN 650 MG: 325 SOLUTION ORAL at 04:03

## 2018-07-27 RX ADMIN — PIPERACILLIN SODIUM AND TAZOBACTAM SODIUM 3.38 G: 3; .375 INJECTION, POWDER, LYOPHILIZED, FOR SOLUTION INTRAVENOUS at 15:59

## 2018-07-27 RX ADMIN — SODIUM CHLORIDE SOLN NEBU 3% 3 ML: 3 NEBU SOLN at 20:18

## 2018-07-27 RX ADMIN — IPRATROPIUM BROMIDE AND ALBUTEROL SULFATE 3 ML: .5; 3 SOLUTION RESPIRATORY (INHALATION) at 16:24

## 2018-07-27 RX ADMIN — FUROSEMIDE 20 MG: 10 INJECTION, SOLUTION INTRAVENOUS at 06:34

## 2018-07-27 RX ADMIN — IPRATROPIUM BROMIDE AND ALBUTEROL SULFATE 3 ML: .5; 3 SOLUTION RESPIRATORY (INHALATION) at 08:13

## 2018-07-27 RX ADMIN — PANTOPRAZOLE SODIUM 40 MG: 40 TABLET, DELAYED RELEASE ORAL at 08:30

## 2018-07-27 RX ADMIN — IPRATROPIUM BROMIDE AND ALBUTEROL SULFATE 3 ML: .5; 3 SOLUTION RESPIRATORY (INHALATION) at 20:17

## 2018-07-27 RX ADMIN — MULTIVITAMIN 15 ML: LIQUID ORAL at 08:31

## 2018-07-27 RX ADMIN — SODIUM CHLORIDE SOLN NEBU 3% 3 ML: 3 NEBU SOLN at 08:13

## 2018-07-27 RX ADMIN — IPRATROPIUM BROMIDE AND ALBUTEROL SULFATE 3 ML: .5; 3 SOLUTION RESPIRATORY (INHALATION) at 12:14

## 2018-07-27 RX ADMIN — FLUCONAZOLE IN SODIUM CHLORIDE 400 MG: 2 INJECTION, SOLUTION INTRAVENOUS at 02:28

## 2018-07-27 RX ADMIN — ACETAMINOPHEN 650 MG: 325 SOLUTION ORAL at 21:05

## 2018-07-27 RX ADMIN — OXYCODONE HYDROCHLORIDE 10 MG: 5 SOLUTION ORAL at 16:40

## 2018-07-27 RX ADMIN — ACETAMINOPHEN 650 MG: 325 SOLUTION ORAL at 08:30

## 2018-07-27 RX ADMIN — PIPERACILLIN SODIUM AND TAZOBACTAM SODIUM 3.38 G: 3; .375 INJECTION, POWDER, LYOPHILIZED, FOR SOLUTION INTRAVENOUS at 04:03

## 2018-07-27 RX ADMIN — OXYCODONE HYDROCHLORIDE 10 MG: 5 SOLUTION ORAL at 21:05

## 2018-07-27 RX ADMIN — OXYCODONE HYDROCHLORIDE 10 MG: 5 SOLUTION ORAL at 12:30

## 2018-07-27 RX ADMIN — ACETAMINOPHEN 650 MG: 325 SOLUTION ORAL at 12:29

## 2018-07-27 RX ADMIN — SODIUM CHLORIDE SOLN NEBU 3% 3 ML: 3 NEBU SOLN at 12:13

## 2018-07-27 RX ADMIN — SODIUM CHLORIDE SOLN NEBU 3% 3 ML: 3 NEBU SOLN at 16:25

## 2018-07-27 ASSESSMENT — ACTIVITIES OF DAILY LIVING (ADL)
ADLS_ACUITY_SCORE: 11

## 2018-07-27 NOTE — PLAN OF CARE
Problem: Patient Care Overview  Goal: Plan of Care/Patient Progress Review  Vitals:    07/26/18 1100 07/26/18 1509 07/26/18 1910 07/27/18 0032   BP: 133/84 140/89 135/84 133/80   BP Location: Left arm Left arm Left arm Left arm   Pulse:       Resp: 16 16 16 16   Temp: 98.6  F (37  C) 98.7  F (37.1  C) 98.9  F (37.2  C) 98.9  F (37.2  C)   TempSrc: Oral Oral Oral Oral   SpO2: 93% 93% 93% 94%   Weight:       Height:         aox3 verbalized mod left incision pain controlled with scheduled tylenol and prn oxycodone q 4 hrs with some relief. Patient ambulated to bathroom x 3 with 2 episodes of loose stools. He voided spon in bedside urinal cl yellow urine. L CT draining min s/s output. G.tube open to gravity with thick green output. Remains NPO.    Addendum: Both IV Zosyn and Fluconazole infusing a/o.  IV Lasix 20 mg give a/o.

## 2018-07-27 NOTE — PLAN OF CARE
"Problem: Patient Care Overview  Goal: Individualization & Mutuality  Outcome: No Change  /84 (BP Location: Left arm)  Pulse 100  Temp 98.9  F (37.2  C) (Oral)  Resp 16  Ht 1.676 m (5' 6\")  Wt 89.1 kg (196 lb 6.4 oz)  SpO2 93%  BMI 31.7 kg/m2    AVSS, tx from 6B this evening, pain at tolerable level with current pain med regime & denies N/V. TF at goal rate in J tube & G to gravity- greenish output. Zosyn per order. CT to WS with serous minimal drainage. Up ind. A&O times 4. Continue POC.       "

## 2018-07-27 NOTE — PROGRESS NOTES
CLINICAL NUTRITION SERVICES - REASSESSMENT NOTE     Nutrition Prescription    RECOMMENDATIONS FOR MDs/PROVIDERS TO ORDER:  None at this time     Malnutrition Status:    Patient does not meet two of the above criteria necessary for diagnosing malnutrition but is at risk for malnutrition    Recommendations already ordered by Registered Dietitian (RD):  Mg++ and Phos add-ons.    Future/Additional Recommendations:  If/when plan to start cycling TF per provider discretion, recommend increase TF rate by 20 mL Q4H as tolerated to goal rate 110 mL/hr and then run x 12 hours daily (1320 mL/day, same provisions as current TF regimen)    If requires full hydration via FT/TF, pt will require additional ~750-1000 mL free water daily (or other per provider discretion pending fluid status). Pt also getting water flushes with meds given through J-tube.    If loose stools persist, consider adding 1 pkt Nutrisource fiber BID (source of soluble fiber, 3 g/packet)     EVALUATION OF THE PROGRESS TOWARD GOALS   Diet: NPO    Nutrition Support: Isosource 1.5 @ goal 55 ml/hr (1320 ml/day) to provide 1980 kcals (28 kcal/kg/day), 90 g PRO (1.3 g/kg/day), 1003 ml free H2O, 232 g CHO and 20 g Fiber daily.     Free Water: 30 mL Q4H for FT patency (does not provide full hydration)    Intake: TF started on 7/22, some delays in advancing TF to goal rate due to low phos, but reached goal rate on 7/25 and is tolerating per chart review.  Remains NPO but plan for swallow study on Monday 7/30.     NEW FINDINGS   Weight: up since admit, suspect fluid related?  Labs: K+ WNL.  Phos WNL yesterday but had been   Meds: D5 @ 10 mL/hr.  Has been receiving 1-time doses of lasix the past 3 days.  GI: c/o loose stools per RN note this afternoon an dovernight    MALNUTRITION  % Intake: Decreased intake does not meet criteria  % Weight Loss: None noted  Subcutaneous Fat Loss: None observed per RD note on 7/22  Muscle Loss: None observed per RD note on 7/22  Fluid  Accumulation/Edema: Trace (right hand) per nursing documentation, does not meet criteria  Malnutrition Diagnosis: Patient does not meet two of the above criteria necessary for diagnosing malnutrition but is at risk for malnutrition  *Pt busy with cares during attempts to visit today, obtained information from chart    Previous Goals   Total avg nutritional intake to meet a minimum of 25 kcal/kg and 1.2 g PRO/kg daily (per dosing wt 70 kg).  Evaluation: Not met    Previous Nutrition Diagnosis  Altered GI function related to esophageal perforation as evidenced by requirement of GJ tube placement to meet 100% of calorie and protein needs.    Evaluation: No change, updated    CURRENT NUTRITION DIAGNOSIS  Predicted inadequate nutrient intake (protein-energy) related to advanced to goal TF rate 2 days ago and tolerating but potential for TF interruptions    INTERVENTIONS  Implementation  Collaboration with other providers: asked provider if would like to start cycling TF today, team to discuss with attending before making that decision    Goals  Total avg nutritional intake to meet a minimum of 25 kcal/kg and 1.2 g PRO/kg daily (per dosing wt 70 kg).    Monitoring/Evaluation  Progress toward goals will be monitored and evaluated per protocol.     Tri Fernández, KATI, LD   [address]B RD Pager: [phone]

## 2018-07-27 NOTE — PLAN OF CARE
"Problem: Patient Care Overview  Goal: Plan of Care/Patient Progress Review  Outcome: No Change  Blood pressure 141/81, pulse 100, temperature 100  F (37.8  C), temperature source Oral, resp. rate 18, height 1.676 m (5' 6\"), weight 89.1 kg (196 lb 6.4 oz), SpO2 94 %.    AVSS, pt on RA. Pt is A/Ox4, denies any SOB, difficulty breathing or N/V. Pt c/o pain w/ cough and hicups on the L side, controlled w/ PRN oxy and scheduled tylenol. Pt lungs are Clear diminished at bases. CT dressing C/D/I, CT to water seal., little output, no air leak. Pt BS+, passing gas, c/o of loose stools, uses urinal w/ good output. Pt has GJtube, G tube to gravity w/ dark brown output, J tube has TF going @ 55mL/hr. Pt has 1 PIV's @ TKO. BG were controled w/ sliding scale insulin. Pt ambulated in hallways x2. Chest xray completed this AM. Will continue to monitor.       "

## 2018-07-27 NOTE — PROGRESS NOTES
"Thoracic Surgery Progress Note  07/27/2018    Subjective:  NAEON. Patient in good spirits and feels well. Ambulating well.    Objective:  /81 (BP Location: Right arm)  Pulse 100  Temp 100  F (37.8  C) (Oral)  Resp 18  Ht 1.676 m (5' 6\")  Wt 89.1 kg (196 lb 6.4 oz)  SpO2 94%  BMI 31.7 kg/m2    I/O last 3 completed shifts:  In: 1350 [I.V.:350; NG/GT:230]  Out: 4535 [Urine:3835; Emesis/NG output:450; Chest Tube:250]    A&Ox3, NAD  Breathing non-labored on low flow nasal canula  Incisions C/D/I  Left CT with serosang output, no air leak.  Soft, NTND, GJ tube in place  Distal extremities warm    Assessment/Plan:  37M now s/p esophageal perforation s/p emergent EGD, PEGJ placement, left thoracotomy with primary esophageal repair and intercostal muscle flap reinforcement, esophageal stent, bilateral chest tubes on 7/21/18.  Recovering well postop.    - L angled CT in place  - Continue NPO  - Swallow study 7/30  - Pulm toilet  - Continue antibiosis  - Will start cycling TF per nutrition recommendations   - Lasix 20mg today  - SQH ppx     Sunny Bowling MD  General Surgery PGY-1      "

## 2018-07-28 LAB
ERYTHROCYTE [DISTWIDTH] IN BLOOD BY AUTOMATED COUNT: 14.2 % (ref 10–15)
GLUCOSE BLDC GLUCOMTR-MCNC: 121 MG/DL (ref 70–99)
GLUCOSE BLDC GLUCOMTR-MCNC: 139 MG/DL (ref 70–99)
GLUCOSE BLDC GLUCOMTR-MCNC: 140 MG/DL (ref 70–99)
GLUCOSE BLDC GLUCOMTR-MCNC: 141 MG/DL (ref 70–99)
HCT VFR BLD AUTO: 38.5 % (ref 40–53)
HGB BLD-MCNC: 12.8 G/DL (ref 13.3–17.7)
MCH RBC QN AUTO: 29 PG (ref 26.5–33)
MCHC RBC AUTO-ENTMCNC: 33.2 G/DL (ref 31.5–36.5)
MCV RBC AUTO: 87 FL (ref 78–100)
PLATELET # BLD AUTO: 321 10E9/L (ref 150–450)
RBC # BLD AUTO: 4.42 10E12/L (ref 4.4–5.9)
WBC # BLD AUTO: 12.1 10E9/L (ref 4–11)

## 2018-07-28 PROCEDURE — 25000128 H RX IP 250 OP 636: Performed by: SURGERY

## 2018-07-28 PROCEDURE — 94799 UNLISTED PULMONARY SVC/PX: CPT

## 2018-07-28 PROCEDURE — 85027 COMPLETE CBC AUTOMATED: CPT | Performed by: STUDENT IN AN ORGANIZED HEALTH CARE EDUCATION/TRAINING PROGRAM

## 2018-07-28 PROCEDURE — 36415 COLL VENOUS BLD VENIPUNCTURE: CPT | Performed by: STUDENT IN AN ORGANIZED HEALTH CARE EDUCATION/TRAINING PROGRAM

## 2018-07-28 PROCEDURE — 25000125 ZZHC RX 250: Performed by: PHYSICIAN ASSISTANT

## 2018-07-28 PROCEDURE — 25000132 ZZH RX MED GY IP 250 OP 250 PS 637: Performed by: PHYSICIAN ASSISTANT

## 2018-07-28 PROCEDURE — 00000146 ZZHCL STATISTIC GLUCOSE BY METER IP

## 2018-07-28 PROCEDURE — 25000128 H RX IP 250 OP 636: Performed by: STUDENT IN AN ORGANIZED HEALTH CARE EDUCATION/TRAINING PROGRAM

## 2018-07-28 PROCEDURE — 25000132 ZZH RX MED GY IP 250 OP 250 PS 637: Performed by: THORACIC SURGERY (CARDIOTHORACIC VASCULAR SURGERY)

## 2018-07-28 PROCEDURE — 27210429 ZZH NUTRITION PRODUCT INTERMEDIATE LITER

## 2018-07-28 PROCEDURE — 12000008 ZZH R&B INTERMEDIATE UMMC

## 2018-07-28 PROCEDURE — 40000275 ZZH STATISTIC RCP TIME EA 10 MIN

## 2018-07-28 PROCEDURE — 25000128 H RX IP 250 OP 636: Performed by: THORACIC SURGERY (CARDIOTHORACIC VASCULAR SURGERY)

## 2018-07-28 PROCEDURE — 25000132 ZZH RX MED GY IP 250 OP 250 PS 637: Performed by: STUDENT IN AN ORGANIZED HEALTH CARE EDUCATION/TRAINING PROGRAM

## 2018-07-28 PROCEDURE — 94640 AIRWAY INHALATION TREATMENT: CPT

## 2018-07-28 PROCEDURE — 94640 AIRWAY INHALATION TREATMENT: CPT | Mod: 76

## 2018-07-28 RX ADMIN — ACETAMINOPHEN 650 MG: 325 SOLUTION ORAL at 00:59

## 2018-07-28 RX ADMIN — PIPERACILLIN SODIUM AND TAZOBACTAM SODIUM 3.38 G: 3; .375 INJECTION, POWDER, LYOPHILIZED, FOR SOLUTION INTRAVENOUS at 05:06

## 2018-07-28 RX ADMIN — MULTIVITAMIN 15 ML: LIQUID ORAL at 09:32

## 2018-07-28 RX ADMIN — OXYCODONE HYDROCHLORIDE 10 MG: 5 SOLUTION ORAL at 00:59

## 2018-07-28 RX ADMIN — OXYCODONE HYDROCHLORIDE 10 MG: 5 SOLUTION ORAL at 18:56

## 2018-07-28 RX ADMIN — PIPERACILLIN SODIUM AND TAZOBACTAM SODIUM 3.38 G: 3; .375 INJECTION, POWDER, LYOPHILIZED, FOR SOLUTION INTRAVENOUS at 09:30

## 2018-07-28 RX ADMIN — IPRATROPIUM BROMIDE AND ALBUTEROL SULFATE 3 ML: .5; 3 SOLUTION RESPIRATORY (INHALATION) at 21:06

## 2018-07-28 RX ADMIN — SODIUM CHLORIDE SOLN NEBU 3% 3 ML: 3 NEBU SOLN at 17:04

## 2018-07-28 RX ADMIN — OXYCODONE HYDROCHLORIDE 10 MG: 5 SOLUTION ORAL at 04:53

## 2018-07-28 RX ADMIN — ACETAMINOPHEN 650 MG: 325 SOLUTION ORAL at 04:53

## 2018-07-28 RX ADMIN — IPRATROPIUM BROMIDE AND ALBUTEROL SULFATE 3 ML: .5; 3 SOLUTION RESPIRATORY (INHALATION) at 12:04

## 2018-07-28 RX ADMIN — OXYCODONE HYDROCHLORIDE 10 MG: 5 SOLUTION ORAL at 22:50

## 2018-07-28 RX ADMIN — SODIUM CHLORIDE SOLN NEBU 3% 3 ML: 3 NEBU SOLN at 12:04

## 2018-07-28 RX ADMIN — SODIUM CHLORIDE SOLN NEBU 3% 3 ML: 3 NEBU SOLN at 21:06

## 2018-07-28 RX ADMIN — PIPERACILLIN SODIUM AND TAZOBACTAM SODIUM 3.38 G: 3; .375 INJECTION, POWDER, LYOPHILIZED, FOR SOLUTION INTRAVENOUS at 16:11

## 2018-07-28 RX ADMIN — IPRATROPIUM BROMIDE AND ALBUTEROL SULFATE 3 ML: .5; 3 SOLUTION RESPIRATORY (INHALATION) at 17:04

## 2018-07-28 RX ADMIN — FLUCONAZOLE IN SODIUM CHLORIDE 400 MG: 2 INJECTION, SOLUTION INTRAVENOUS at 03:08

## 2018-07-28 RX ADMIN — IPRATROPIUM BROMIDE AND ALBUTEROL SULFATE 3 ML: .5; 3 SOLUTION RESPIRATORY (INHALATION) at 08:03

## 2018-07-28 RX ADMIN — SODIUM CHLORIDE SOLN NEBU 3% 3 ML: 3 NEBU SOLN at 08:03

## 2018-07-28 RX ADMIN — ACETAMINOPHEN 650 MG: 325 SOLUTION ORAL at 14:42

## 2018-07-28 RX ADMIN — ACETAMINOPHEN 650 MG: 325 SOLUTION ORAL at 09:32

## 2018-07-28 RX ADMIN — PIPERACILLIN SODIUM AND TAZOBACTAM SODIUM 3.38 G: 3; .375 INJECTION, POWDER, LYOPHILIZED, FOR SOLUTION INTRAVENOUS at 22:50

## 2018-07-28 RX ADMIN — ACETAMINOPHEN 650 MG: 325 SOLUTION ORAL at 18:56

## 2018-07-28 RX ADMIN — OXYCODONE HYDROCHLORIDE 10 MG: 5 SOLUTION ORAL at 09:31

## 2018-07-28 RX ADMIN — OXYCODONE HYDROCHLORIDE 10 MG: 5 SOLUTION ORAL at 14:43

## 2018-07-28 RX ADMIN — PANTOPRAZOLE SODIUM 40 MG: 40 TABLET, DELAYED RELEASE ORAL at 09:33

## 2018-07-28 RX ADMIN — ACETAMINOPHEN 650 MG: 325 SOLUTION ORAL at 22:50

## 2018-07-28 RX ADMIN — ENOXAPARIN SODIUM 40 MG: 100 INJECTION SUBCUTANEOUS at 09:31

## 2018-07-28 ASSESSMENT — ACTIVITIES OF DAILY LIVING (ADL)
ADLS_ACUITY_SCORE: 11

## 2018-07-28 NOTE — PLAN OF CARE
"Problem: Patient Care Overview  Goal: Individualization & Mutuality  Outcome: Improving  /75 (BP Location: Right arm)  Pulse 100  Temp 97.9  F (36.6  C) (Oral)  Resp 18  Ht 1.676 m (5' 6\")  Wt 89.1 kg (196 lb 6.4 oz)  SpO2 93%  BMI 31.7 kg/m2    AVSS, pain managed well with current regime & denies N/V. Strict NPO with TF at 55 ml/hr. Last . CT to water seal with small serous output. G tube to gravity & J with TF +meds. Zosyn per order. Up in hallways independently. Ind with IS. Reported flatus & BM's. Voiding adequately. A&O times 4. Continue POC.       "

## 2018-07-28 NOTE — PLAN OF CARE
Problem: Patient Care Overview  Goal: Plan of Care/Patient Progress Review  Outcome: Improving  Vital signs:  Temp: 98.3  F (36.8  C) Temp src: Oral BP: 123/73   Heart Rate: 79 Resp: 20 SpO2: 91 % O2 Device: None (Room air)   VSS. C/O pain around chest tube, oxycodone and tylenol given q4h for pain relief. Denies pain. Chest tube to water seal with no new output. G-tube to gravity with green drainage. J-tube with continuous tube feeding at 55 mL/hr. 0200 . Up independently in reyna and rooms. Appears to rest between cares.

## 2018-07-28 NOTE — PROGRESS NOTES
"Thoracic Surgery Progress Note  07/28/2018    Subjective:  NAEON. Patient in good spirits and feels well. Ambulating well.    Objective:  /67 (BP Location: Right arm)  Pulse 100  Temp 99.6  F (37.6  C) (Oral)  Resp 20  Ht 1.676 m (5' 6\")  Wt 89.1 kg (196 lb 6.4 oz)  SpO2 93%  BMI 31.7 kg/m2    I/O last 3 completed shifts:  In: 1000 [I.V.:200; NG/GT:30]  Out: 3825 [Urine:3600; Emesis/NG output:225]    A&Ox3, NAD  Breathing non-labored on room air  Incisions C/D/I  Left CT with serosang output, no air leak.  Soft, NTND, GJ tube in place  Distal extremities warm    Assessment/Plan:  37M now s/p esophageal perforation s/p emergent EGD, PEGJ placement, left thoracotomy with primary esophageal repair and intercostal muscle flap reinforcement, esophageal stent, bilateral chest tubes on 7/21/18.  Recovering well postop.    - L angled CT in place  - Continue NPO  - Swallow study 7/30  - Pulm toilet  - Continue antibiosis: Zosyn/Fluconazole  - Will start cycling TF per nutrition recommendations   - SQH ppx     Esau Emerson MD  PGY-3 General Surgery    "

## 2018-07-28 NOTE — PLAN OF CARE
"Problem: Patient Care Overview  Goal: Plan of Care/Patient Progress Review  /67 (BP Location: Right arm)  Pulse 100  Temp 99.6  F (37.6  C) (Oral)  Resp 20  Ht 1.676 m (5' 6\")  Wt 89.1 kg (196 lb 6.4 oz)  SpO2 93%  BMI 31.7 kg/m2    Pt has JG tube. G tube to gravity. J tube to feedings at 55mL/hr. Medicine through J tube. Tolerated without discomfort. Flushed with 30mL after meds. Voiding independently. No BM but passing flatus. Pain managed with analgesics. L chest tube with water seal. Moves independently ad karo. Patient able to reposition independently and sleeps/stays in chair most of the time stating \"the bed isn't comfy and I cant find a comfortable position\". Patient has pillows in chair to adjust as needed for comfort. Will continue to monitor and continue with POC      "

## 2018-07-29 LAB
GLUCOSE BLDC GLUCOMTR-MCNC: 119 MG/DL (ref 70–99)
GLUCOSE BLDC GLUCOMTR-MCNC: 136 MG/DL (ref 70–99)
GLUCOSE BLDC GLUCOMTR-MCNC: 142 MG/DL (ref 70–99)
GLUCOSE BLDC GLUCOMTR-MCNC: 148 MG/DL (ref 70–99)
GLUCOSE BLDC GLUCOMTR-MCNC: 149 MG/DL (ref 70–99)
GLUCOSE BLDC GLUCOMTR-MCNC: 172 MG/DL (ref 70–99)

## 2018-07-29 PROCEDURE — 25800025 ZZH RX 258: Performed by: STUDENT IN AN ORGANIZED HEALTH CARE EDUCATION/TRAINING PROGRAM

## 2018-07-29 PROCEDURE — 25000128 H RX IP 250 OP 636: Performed by: STUDENT IN AN ORGANIZED HEALTH CARE EDUCATION/TRAINING PROGRAM

## 2018-07-29 PROCEDURE — 25000128 H RX IP 250 OP 636: Performed by: SURGERY

## 2018-07-29 PROCEDURE — 25000132 ZZH RX MED GY IP 250 OP 250 PS 637: Performed by: THORACIC SURGERY (CARDIOTHORACIC VASCULAR SURGERY)

## 2018-07-29 PROCEDURE — 25000125 ZZHC RX 250: Performed by: PHYSICIAN ASSISTANT

## 2018-07-29 PROCEDURE — 12000008 ZZH R&B INTERMEDIATE UMMC

## 2018-07-29 PROCEDURE — 25000132 ZZH RX MED GY IP 250 OP 250 PS 637: Performed by: PHYSICIAN ASSISTANT

## 2018-07-29 PROCEDURE — 94640 AIRWAY INHALATION TREATMENT: CPT | Mod: 76

## 2018-07-29 PROCEDURE — 94640 AIRWAY INHALATION TREATMENT: CPT

## 2018-07-29 PROCEDURE — 25000132 ZZH RX MED GY IP 250 OP 250 PS 637: Performed by: STUDENT IN AN ORGANIZED HEALTH CARE EDUCATION/TRAINING PROGRAM

## 2018-07-29 PROCEDURE — 27210429 ZZH NUTRITION PRODUCT INTERMEDIATE LITER

## 2018-07-29 PROCEDURE — 00000146 ZZHCL STATISTIC GLUCOSE BY METER IP

## 2018-07-29 PROCEDURE — 40000275 ZZH STATISTIC RCP TIME EA 10 MIN

## 2018-07-29 PROCEDURE — 25000128 H RX IP 250 OP 636: Performed by: THORACIC SURGERY (CARDIOTHORACIC VASCULAR SURGERY)

## 2018-07-29 RX ADMIN — SODIUM CHLORIDE SOLN NEBU 3% 3 ML: 3 NEBU SOLN at 23:50

## 2018-07-29 RX ADMIN — PIPERACILLIN SODIUM AND TAZOBACTAM SODIUM 3.38 G: 3; .375 INJECTION, POWDER, LYOPHILIZED, FOR SOLUTION INTRAVENOUS at 15:55

## 2018-07-29 RX ADMIN — SODIUM CHLORIDE SOLN NEBU 3% 3 ML: 3 NEBU SOLN at 16:15

## 2018-07-29 RX ADMIN — FLUCONAZOLE IN SODIUM CHLORIDE 400 MG: 2 INJECTION, SOLUTION INTRAVENOUS at 02:55

## 2018-07-29 RX ADMIN — PIPERACILLIN SODIUM AND TAZOBACTAM SODIUM 3.38 G: 3; .375 INJECTION, POWDER, LYOPHILIZED, FOR SOLUTION INTRAVENOUS at 10:26

## 2018-07-29 RX ADMIN — ACETAMINOPHEN 650 MG: 325 SOLUTION ORAL at 22:56

## 2018-07-29 RX ADMIN — ACETAMINOPHEN 650 MG: 325 SOLUTION ORAL at 19:02

## 2018-07-29 RX ADMIN — IPRATROPIUM BROMIDE AND ALBUTEROL SULFATE 3 ML: .5; 3 SOLUTION RESPIRATORY (INHALATION) at 19:32

## 2018-07-29 RX ADMIN — IPRATROPIUM BROMIDE AND ALBUTEROL SULFATE 3 ML: .5; 3 SOLUTION RESPIRATORY (INHALATION) at 16:15

## 2018-07-29 RX ADMIN — OXYCODONE HYDROCHLORIDE 10 MG: 5 SOLUTION ORAL at 22:57

## 2018-07-29 RX ADMIN — OXYCODONE HYDROCHLORIDE 10 MG: 5 SOLUTION ORAL at 06:58

## 2018-07-29 RX ADMIN — MULTIVITAMIN 15 ML: LIQUID ORAL at 07:48

## 2018-07-29 RX ADMIN — PANTOPRAZOLE SODIUM 40 MG: 40 TABLET, DELAYED RELEASE ORAL at 07:48

## 2018-07-29 RX ADMIN — OXYCODONE HYDROCHLORIDE 10 MG: 5 SOLUTION ORAL at 10:56

## 2018-07-29 RX ADMIN — PIPERACILLIN SODIUM AND TAZOBACTAM SODIUM 3.38 G: 3; .375 INJECTION, POWDER, LYOPHILIZED, FOR SOLUTION INTRAVENOUS at 22:06

## 2018-07-29 RX ADMIN — IPRATROPIUM BROMIDE AND ALBUTEROL SULFATE 3 ML: .5; 3 SOLUTION RESPIRATORY (INHALATION) at 09:08

## 2018-07-29 RX ADMIN — OXYCODONE HYDROCHLORIDE 10 MG: 5 SOLUTION ORAL at 02:56

## 2018-07-29 RX ADMIN — ACETAMINOPHEN 650 MG: 325 SOLUTION ORAL at 06:58

## 2018-07-29 RX ADMIN — ACETAMINOPHEN 650 MG: 325 SOLUTION ORAL at 10:56

## 2018-07-29 RX ADMIN — SODIUM CHLORIDE SOLN NEBU 3% 3 ML: 3 NEBU SOLN at 19:32

## 2018-07-29 RX ADMIN — ACETAMINOPHEN 650 MG: 325 SOLUTION ORAL at 15:00

## 2018-07-29 RX ADMIN — ACETAMINOPHEN 650 MG: 325 SOLUTION ORAL at 02:56

## 2018-07-29 RX ADMIN — PIPERACILLIN SODIUM AND TAZOBACTAM SODIUM 3.38 G: 3; .375 INJECTION, POWDER, LYOPHILIZED, FOR SOLUTION INTRAVENOUS at 04:11

## 2018-07-29 RX ADMIN — SODIUM CHLORIDE SOLN NEBU 3% 3 ML: 3 NEBU SOLN at 09:08

## 2018-07-29 RX ADMIN — IPRATROPIUM BROMIDE AND ALBUTEROL SULFATE 3 ML: .5; 3 SOLUTION RESPIRATORY (INHALATION) at 23:50

## 2018-07-29 RX ADMIN — ENOXAPARIN SODIUM 40 MG: 100 INJECTION SUBCUTANEOUS at 07:51

## 2018-07-29 RX ADMIN — DEXTROSE AND SODIUM CHLORIDE: 5; 450 INJECTION, SOLUTION INTRAVENOUS at 15:19

## 2018-07-29 RX ADMIN — OXYCODONE HYDROCHLORIDE 10 MG: 5 SOLUTION ORAL at 19:02

## 2018-07-29 RX ADMIN — OXYCODONE HYDROCHLORIDE 10 MG: 5 SOLUTION ORAL at 15:01

## 2018-07-29 ASSESSMENT — ACTIVITIES OF DAILY LIVING (ADL)
ADLS_ACUITY_SCORE: 11

## 2018-07-29 NOTE — PROGRESS NOTES
"Thoracic Surgery Progress Note  07/29/2018    Subjective:  NAEON. Feels well overall. Ambulating well.    Objective:  /72  Pulse 88  Temp 98.7  F (37.1  C) (Oral)  Resp 20  Ht 1.676 m (5' 6\")  Wt 82.3 kg (181 lb 6.4 oz)  SpO2 96%  BMI 29.28 kg/m2    I/O last 3 completed shifts:  In: 1460 [I.V.:400; NG/GT:180]  Out: 2975 [Urine:2775; Emesis/NG output:200]    A&Ox3, NAD  Breathing non-labored on room air  Incisions C/D/I  Left CT with serosang output, no air leak.  Soft, NTND, GJ tube in place  Distal extremities warm    Assessment/Plan:  37M now s/p esophageal perforation s/p emergent EGD, PEGJ placement, left thoracotomy with primary esophageal repair and intercostal muscle flap reinforcement, esophageal stent, bilateral chest tubes on 7/21/18.  Recovering well postop.    - L angled CT in place  - Continue NPO  - Swallow study 7/30  - Pulm toilet  - Continue antibiosis: Zosyn/Fluconazole  - Cycle TFs per nutrition recommendations   - SQH ppx     Esau Emerson MD  PGY-3 General Surgery    "

## 2018-07-29 NOTE — PLAN OF CARE
Problem: Patient Care Overview  Goal: Plan of Care/Patient Progress Review  Outcome: No Change  Vital signs:  Temp: 98.7  F (37.1  C) Temp src: Oral BP: 113/60   Heart Rate: 91 Resp: 20 SpO2: 95 % O2 Device: None (Room air)   VSS. C/O abdominal pain, oxycodone and tylenol given q4h for pain relief via J-tube. Denies nausea. L chest tube to water seal, serosanguinous output. G-tube to gravity with green/brown output. J-tube with continuous tube feedings at 55 mL/hr. Up independently in room and halls. Voiding adequate amounts. Appears to rest intermittently between cares.

## 2018-07-29 NOTE — PLAN OF CARE
"Problem: Patient Care Overview  Goal: Plan of Care/Patient Progress Review  Outcome: No Change  /65 (BP Location: Right arm)  Pulse 100  Temp 99.1  F (37.3  C) (Oral)  Resp 20  Ht 1.676 m (5' 6\")  Wt 82.3 kg (181 lb 6.4 oz)  SpO2 91%  BMI 29.28 kg/m2  Pt reports pain is well controlled with scheduled tylenol and prn oxycodone.  NPO, denies n/v.  G tube to gravity with 200 ml of brown drainage.  Continuous TF at 55 ml/hr via J tube.  Meds given via J tube.  Voiding adequate UOP.  +BS, + flatus, no BM during shift.  Ambulated in reyna independently.  L CT to water seal pulling 22 ml serosang drainage.  Denies having any SOB.  Continue POC.         "

## 2018-07-29 NOTE — PLAN OF CARE
Problem: Patient Care Overview  Goal: Plan of Care/Patient Progress Review  Patient requested Oxycodone/tylenol with relief to pain.return demo for medication insertion via J-tube with accurate technique.Attained learning center visit.Initiated advancing t-fdgs at 1000.Currently ay 95ml/hour without apparent GI upset.Chest tube in place without measurable output.Ad karo in reyna,gait steady.Continue to monitor.

## 2018-07-29 NOTE — PLAN OF CARE
Problem: Patient Care Overview  Goal: Discharge Needs Assessment  07/29/18 8797     Micki Singh-Registered Nurse (Nursing)  Patient and parents seen at bedside for G-J feeding cares. RD correctly on model with all cares including site cares, anchoring tube, flushing, medication administration and use of Valeriy infusion pump. Mom and pt asked many relevant questions. Answered all teach back questions appropriately. Received written material related to all content taught. State they are overwhelmed with all information and will appreciate home care support for review. Was encouraged to practice all skills with nursing staff before discharge, if appropriate.

## 2018-07-30 ENCOUNTER — APPOINTMENT (OUTPATIENT)
Dept: GENERAL RADIOLOGY | Facility: CLINIC | Age: 38
End: 2018-07-30
Attending: STUDENT IN AN ORGANIZED HEALTH CARE EDUCATION/TRAINING PROGRAM
Payer: COMMERCIAL

## 2018-07-30 ENCOUNTER — APPOINTMENT (OUTPATIENT)
Dept: SPEECH THERAPY | Facility: CLINIC | Age: 38
End: 2018-07-30
Attending: THORACIC SURGERY (CARDIOTHORACIC VASCULAR SURGERY)
Payer: COMMERCIAL

## 2018-07-30 LAB
GLUCOSE BLDC GLUCOMTR-MCNC: 101 MG/DL (ref 70–99)
GLUCOSE BLDC GLUCOMTR-MCNC: 111 MG/DL (ref 70–99)
GLUCOSE BLDC GLUCOMTR-MCNC: 137 MG/DL (ref 70–99)
GLUCOSE BLDC GLUCOMTR-MCNC: 146 MG/DL (ref 70–99)
GLUCOSE BLDC GLUCOMTR-MCNC: 161 MG/DL (ref 70–99)
PLATELET # BLD AUTO: 404 10E9/L (ref 150–450)

## 2018-07-30 PROCEDURE — 94640 AIRWAY INHALATION TREATMENT: CPT | Mod: 76

## 2018-07-30 PROCEDURE — 40000225 ZZH STATISTIC SLP WARD VISIT

## 2018-07-30 PROCEDURE — 27210429 ZZH NUTRITION PRODUCT INTERMEDIATE LITER

## 2018-07-30 PROCEDURE — 92611 MOTION FLUOROSCOPY/SWALLOW: CPT | Mod: GN

## 2018-07-30 PROCEDURE — 25000128 H RX IP 250 OP 636: Performed by: THORACIC SURGERY (CARDIOTHORACIC VASCULAR SURGERY)

## 2018-07-30 PROCEDURE — 00000146 ZZHCL STATISTIC GLUCOSE BY METER IP

## 2018-07-30 PROCEDURE — 25000132 ZZH RX MED GY IP 250 OP 250 PS 637: Performed by: THORACIC SURGERY (CARDIOTHORACIC VASCULAR SURGERY)

## 2018-07-30 PROCEDURE — 25000132 ZZH RX MED GY IP 250 OP 250 PS 637: Performed by: STUDENT IN AN ORGANIZED HEALTH CARE EDUCATION/TRAINING PROGRAM

## 2018-07-30 PROCEDURE — 36415 COLL VENOUS BLD VENIPUNCTURE: CPT | Performed by: THORACIC SURGERY (CARDIOTHORACIC VASCULAR SURGERY)

## 2018-07-30 PROCEDURE — 71046 X-RAY EXAM CHEST 2 VIEWS: CPT

## 2018-07-30 PROCEDURE — 25000125 ZZHC RX 250: Performed by: PHYSICIAN ASSISTANT

## 2018-07-30 PROCEDURE — 12000008 ZZH R&B INTERMEDIATE UMMC

## 2018-07-30 PROCEDURE — 25000128 H RX IP 250 OP 636: Performed by: STUDENT IN AN ORGANIZED HEALTH CARE EDUCATION/TRAINING PROGRAM

## 2018-07-30 PROCEDURE — 74230 X-RAY XM SWLNG FUNCJ C+: CPT

## 2018-07-30 PROCEDURE — 25000128 H RX IP 250 OP 636: Performed by: SURGERY

## 2018-07-30 PROCEDURE — 85049 AUTOMATED PLATELET COUNT: CPT | Performed by: THORACIC SURGERY (CARDIOTHORACIC VASCULAR SURGERY)

## 2018-07-30 PROCEDURE — 40000275 ZZH STATISTIC RCP TIME EA 10 MIN

## 2018-07-30 PROCEDURE — 94640 AIRWAY INHALATION TREATMENT: CPT

## 2018-07-30 PROCEDURE — 25000132 ZZH RX MED GY IP 250 OP 250 PS 637: Performed by: PHYSICIAN ASSISTANT

## 2018-07-30 RX ORDER — POLYETHYLENE GLYCOL 3350 17 G/17G
17 POWDER, FOR SOLUTION ORAL ONCE
Status: COMPLETED | OUTPATIENT
Start: 2018-07-30 | End: 2018-07-30

## 2018-07-30 RX ORDER — POLYETHYLENE GLYCOL 3350 17 G/17G
17 POWDER, FOR SOLUTION ORAL 2 TIMES DAILY
Status: DISCONTINUED | OUTPATIENT
Start: 2018-07-30 | End: 2018-08-02 | Stop reason: HOSPADM

## 2018-07-30 RX ADMIN — ENOXAPARIN SODIUM 40 MG: 100 INJECTION SUBCUTANEOUS at 07:37

## 2018-07-30 RX ADMIN — DIATRIZOATE MEGLUMINE AND DIATRIZOATE SODIUM 120 ML: 660; 100 SOLUTION ORAL; RECTAL at 11:48

## 2018-07-30 RX ADMIN — OXYCODONE HYDROCHLORIDE 10 MG: 5 SOLUTION ORAL at 06:51

## 2018-07-30 RX ADMIN — PANTOPRAZOLE SODIUM 40 MG: 40 TABLET, DELAYED RELEASE ORAL at 07:37

## 2018-07-30 RX ADMIN — ACETAMINOPHEN 650 MG: 325 SOLUTION ORAL at 02:48

## 2018-07-30 RX ADMIN — ACETAMINOPHEN 650 MG: 325 SOLUTION ORAL at 10:37

## 2018-07-30 RX ADMIN — POLYETHYLENE GLYCOL 3350 17 G: 17 POWDER, FOR SOLUTION ORAL at 20:13

## 2018-07-30 RX ADMIN — PIPERACILLIN SODIUM AND TAZOBACTAM SODIUM 3.38 G: 3; .375 INJECTION, POWDER, LYOPHILIZED, FOR SOLUTION INTRAVENOUS at 16:02

## 2018-07-30 RX ADMIN — PIPERACILLIN SODIUM AND TAZOBACTAM SODIUM 3.38 G: 3; .375 INJECTION, POWDER, LYOPHILIZED, FOR SOLUTION INTRAVENOUS at 04:35

## 2018-07-30 RX ADMIN — IPRATROPIUM BROMIDE AND ALBUTEROL SULFATE 3 ML: .5; 3 SOLUTION RESPIRATORY (INHALATION) at 16:10

## 2018-07-30 RX ADMIN — SODIUM CHLORIDE SOLN NEBU 3% 3 ML: 3 NEBU SOLN at 13:13

## 2018-07-30 RX ADMIN — POLYETHYLENE GLYCOL 3350 17 G: 17 POWDER, FOR SOLUTION ORAL at 12:46

## 2018-07-30 RX ADMIN — OXYCODONE HYDROCHLORIDE 10 MG: 5 SOLUTION ORAL at 02:48

## 2018-07-30 RX ADMIN — FLUCONAZOLE IN SODIUM CHLORIDE 400 MG: 2 INJECTION, SOLUTION INTRAVENOUS at 02:48

## 2018-07-30 RX ADMIN — OXYCODONE HYDROCHLORIDE 10 MG: 5 SOLUTION ORAL at 16:02

## 2018-07-30 RX ADMIN — SODIUM CHLORIDE SOLN NEBU 3% 3 ML: 3 NEBU SOLN at 16:10

## 2018-07-30 RX ADMIN — IPRATROPIUM BROMIDE AND ALBUTEROL SULFATE 3 ML: .5; 3 SOLUTION RESPIRATORY (INHALATION) at 13:13

## 2018-07-30 RX ADMIN — ACETAMINOPHEN 650 MG: 325 SOLUTION ORAL at 06:50

## 2018-07-30 RX ADMIN — IPRATROPIUM BROMIDE AND ALBUTEROL SULFATE 3 ML: .5; 3 SOLUTION RESPIRATORY (INHALATION) at 20:20

## 2018-07-30 RX ADMIN — OXYCODONE HYDROCHLORIDE 10 MG: 5 SOLUTION ORAL at 20:12

## 2018-07-30 RX ADMIN — ACETAMINOPHEN 650 MG: 325 SOLUTION ORAL at 20:12

## 2018-07-30 RX ADMIN — PIPERACILLIN SODIUM AND TAZOBACTAM SODIUM 3.38 G: 3; .375 INJECTION, POWDER, LYOPHILIZED, FOR SOLUTION INTRAVENOUS at 10:21

## 2018-07-30 RX ADMIN — SODIUM CHLORIDE SOLN NEBU 3% 3 ML: 3 NEBU SOLN at 20:20

## 2018-07-30 RX ADMIN — ACETAMINOPHEN 650 MG: 325 SOLUTION ORAL at 16:02

## 2018-07-30 RX ADMIN — OXYCODONE HYDROCHLORIDE 10 MG: 5 SOLUTION ORAL at 10:37

## 2018-07-30 RX ADMIN — MULTIVITAMIN 15 ML: LIQUID ORAL at 07:37

## 2018-07-30 ASSESSMENT — ACTIVITIES OF DAILY LIVING (ADL)
ADLS_ACUITY_SCORE: 11

## 2018-07-30 NOTE — PROGRESS NOTES
CLINICAL NUTRITION SERVICES - BRIEF NOTE  *See RD note on 7/27 for last nutrition reassessment details     Nutrition Prescription  RECOMMENDATIONS FOR MDs/PROVIDERS TO ORDER:  Further adjustments to free water flushes per provider discretion pending fluid status and pt's ability to advance diet    Recommendations already ordered by Registered Dietitian (RD):  Clarified times in cycled TF orders (8 PM - 8 AM)     TF cycled 7/29 by MD per RD recs on 7/27, tolerated per chart review.  Free water flushes of 100 mL Q4H ordered by MD.      Pt seen by SLP today who report pt okay for clear liquid diet from swallowing standpoint.  Pt underwent esophogram today as well    Monitoring/Evaluation  Progress toward goals will be monitored and evaluated per protocol.     Tri Fernández, KATI, LD   7B RD Pager: [phone]

## 2018-07-30 NOTE — PLAN OF CARE
Problem: Patient Care Overview  Goal: Plan of Care/Patient Progress Review  Outcome: No Change  Vital signs:  Temp: 97.4  F (36.3  C) Temp src: Oral BP: 130/62 Pulse: 88   Resp: 20 SpO2: 94 % O2 Device: None (Room air)   VSS. L chest tube to water seal with no new output. G-tube to gravity with green drainage. J-tube with meds and tube feedings running at goal rate of 110 mL/hr will be turned off at 0600. Pt states pain is well controlled with tylenol and oxycodone. Tolerating tube feeds, denies nausea, does note sensation of abdominal fullness. Up independently in room and halls. Voiding adequate amounts. Appears to sleep intermittently throughout the shift.

## 2018-07-30 NOTE — PLAN OF CARE
Problem: Patient Care Overview  Goal: Plan of Care/Patient Progress Review  Patient requested Oxycodone/Tylenol with good relief.Attained x-ray,swallow study.Voiding,reported small loose stool.Initiated Miralax per orders.Ambulated halls,gait steady,denied dyspnea.Noted increased output with chest tube, serosanguinous.Continue to monitor.

## 2018-07-30 NOTE — PLAN OF CARE
"Problem: Patient Care Overview  Goal: Plan of Care/Patient Progress Review  Outcome: No Change  /63 (BP Location: Right arm)  Pulse 95  Temp 98.2  F (36.8  C) (Oral)  Resp 20  Ht 1.676 m (5' 6\")  Wt 79.6 kg (175 lb 6.4 oz)  SpO2 91%  BMI 28.31 kg/m2  AVSS.  Pt states pain is well controlled with PRN oxycodone and scheduled tylenol.  Tolerating TF at goal rate 110 ml/hr via J tube well, denies n/v.  G tube to gravity with no output during shift.  + BS, + flatus, had 1 BM during shift.  Voiding adequate with urinal.  L CT to water seal with no output this shift.  On RA, sating in mid 90's, without any SOB.  Ambulated in reyna independently.  Continue POC.               "

## 2018-07-30 NOTE — PLAN OF CARE
Problem: Patient Care Overview  Goal: Plan of Care/Patient Progress Review  Discharge Planner SLP   Patient plan for discharge: home  Current status: Video swallow evaluation completed per MD order. Pt presents with functional oral-pharyngeal swallow on exam. minimal pharyngeal swallow delay and minimal flash penetration observed intermittently, likely due to dry pharyngeal mucosa and limited use of pharyngeal musculature since admission, but no concern for aspiration. Ok for clear liquid diet from swallow standpoint. Will defer to Thoracic team for initiation of PO diet, pending results of esophagram, and further diet advance as medically appropriate. No ongoing ST needs indicated at this time. Will sign off  Barriers to return to prior living situation: none from ST standpoint  Recommendations for discharge: will defer to medical team  Rationale for recommendations: no ongoing ST needs       Entered by: Jasmin Fish 07/30/2018 12:30 PM

## 2018-07-30 NOTE — PROGRESS NOTES
07/30/18 1221   General Information   Onset Date 07/21/18   Start of Care Date 07/30/18   Referring Physician Sunny Bowling MD   Patient/Family Goals Statement Pt wants to be able to eat/drink   Swallowing Evaluation Videofluoroscopic evaluation   Behaviorial Observations WFL (within functional limits)   Mode of current nutrition NPO;PEG   Respiratory Status Room air   Comments Orders received for videoswallow evaluation to rule out aspiration prior to esophagram to assess for esophageal leak. Pt s/p esophageal perforation s/p emergent EGD, PEGJ placement, left thoracotomy with primary esophageal repair and intercostal muscle flap reinforcement, esophageal stent, bilateral chest tubes on 7/21/18.   Clinical Swallow Evaluation   Oral Musculature generally intact   Structural Abnormalities none present   Dentition present and adequate   Laryngeal Function Cough;Throat clear;Swallow;Voicing initiated   VFSS Evaluation   VFSS Additional Documentation Yes   VFSS Eval: Radiology   Radiologist resident   Views Taken left lateral   Physical Location of Procedure [facility] radiology dept rm 5   VFSS Eval: Thin Liquid Texture Trial   Mode of Presentation, Thin Liquid cup;spoon;straw;self-fed;fed by clinician   Order of Presentation 1, 2, 3, 4, 5   Preparatory Phase WFL   Oral Phase, Thin Liquid WFL   Pharyngeal Phase, Thin Liquid Delayed swallow reflex  (minimal swallow delay)   Rosenbek's Penetration Aspiration Scale: Thin Liquid Trial Results (minimal flash penetration)   Diagnostic Statement swallow functional for thin liquids on imagine. minimal swallow delay and mimal flash penetration, likely due to dry pharyngeal mucosa and limited use of swallow musculature since admission. no concerns for aspiration   Swallow Compensations   Swallow Compensations Pacing;Reduce amounts   Esophageal Phase of Swallow   Esophageal comments esophagram completed; see radiologists report for details   Swallow Eval: Clinical Impressions    Skilled Criteria for Therapy Intervention No problems identified which require skilled intervention   Functional Assessment Scale (FAS) 7   Dysphagia Outcome Severity Scale (CARLOTA) Level 7 - CARLOTA   Treatment Diagnosis functional oral-phayrngeal swallow   Diet texture recommendations Clear liquid  (will defer to Thoracic team for diet advance as appropriate)   Recommended Feeding/Eating Techniques maintain upright posture during/after eating for 30 mins;small sips/bites   Demonstrates Need for Referral to Another Service occupational therapy;physical therapy   Predicted Duration of Therapy Intervention (days/wks) evaluation only   Anticipated Discharge Disposition (will defer to PT/OT; no ongoing ST needs)   Risks and Benefits of Treatment have been explained. Yes   Patient, family and/or staff in agreement with Plan of Care Yes   Clinical Impression Comments Video swallow evaluation completed per MD order. Pt presents with functional oral-pharyngeal swallow on exam. minimal pharyngeal swallow delay and minimal flash penetration observed intermittently, likely due to dry pharyngeal mucosa and limited use of pharyngeal musculature since admission, but no concern for aspiration. Ok for clear liquid diet from swallow standpoint. Will defer to Thoracic team for initiation of PO diet, pending results of esophagram, and further diet advance as medically appropriate. No ongoing ST needs indicated at this time. Will sign off   Total Evaluation Time   Total Evaluation Time (Minutes) 11

## 2018-07-30 NOTE — PROGRESS NOTES
"Thoracic Surgery Progress Note  07/30/2018    Subjective:  NAEON. Feels well overall. Ambulating well.    Objective:  /73  Pulse 80  Temp 98.8  F (37.1  C)  Resp 20  Ht 1.676 m (5' 6\")  Wt 79.6 kg (175 lb 6.4 oz)  SpO2 97%  BMI 28.31 kg/m2    I/O last 3 completed shifts:  In: 2620 [I.V.:200; NG/GT:420]  Out: 2340 [Urine:2300; Emesis/NG output:40]    A&Ox3, NAD  Breathing non-labored on room air  Incisions C/D/I  Left CT with serosang output, no air leak.  Soft, NTND, GJ tube in place  Distal extremities warm    Assessment/Plan:  37M now s/p esophageal perforation s/p emergent EGD, PEGJ placement, left thoracotomy with primary esophageal repair and intercostal muscle flap reinforcement, esophageal stent, bilateral chest tubes on 7/21/18.  Recovering well postop.    - Swallow study and esophagram today. If reassuring may start PO liquids.  - Continue NPO in meanwhile  - L angled CT in place  - Pulm toilet  - Continue antibiosis: Zosyn/Fluconazole  - Cycle TFs per nutrition recommendations   - SQH ppx     Esau Emerson MD  PGY-3 General Surgery    "

## 2018-07-31 ENCOUNTER — APPOINTMENT (OUTPATIENT)
Dept: GENERAL RADIOLOGY | Facility: CLINIC | Age: 38
End: 2018-07-31
Attending: STUDENT IN AN ORGANIZED HEALTH CARE EDUCATION/TRAINING PROGRAM
Payer: COMMERCIAL

## 2018-07-31 LAB
GLUCOSE BLDC GLUCOMTR-MCNC: 144 MG/DL (ref 70–99)
GLUCOSE BLDC GLUCOMTR-MCNC: 149 MG/DL (ref 70–99)
GLUCOSE BLDC GLUCOMTR-MCNC: 177 MG/DL (ref 70–99)
GLUCOSE BLDC GLUCOMTR-MCNC: 92 MG/DL (ref 70–99)

## 2018-07-31 PROCEDURE — 94640 AIRWAY INHALATION TREATMENT: CPT

## 2018-07-31 PROCEDURE — 25000128 H RX IP 250 OP 636: Performed by: THORACIC SURGERY (CARDIOTHORACIC VASCULAR SURGERY)

## 2018-07-31 PROCEDURE — 25000132 ZZH RX MED GY IP 250 OP 250 PS 637: Performed by: PHYSICIAN ASSISTANT

## 2018-07-31 PROCEDURE — 40000141 ZZH STATISTIC PERIPHERAL IV START W/O US GUIDANCE

## 2018-07-31 PROCEDURE — 40000275 ZZH STATISTIC RCP TIME EA 10 MIN

## 2018-07-31 PROCEDURE — 25000132 ZZH RX MED GY IP 250 OP 250 PS 637: Performed by: THORACIC SURGERY (CARDIOTHORACIC VASCULAR SURGERY)

## 2018-07-31 PROCEDURE — 00000146 ZZHCL STATISTIC GLUCOSE BY METER IP

## 2018-07-31 PROCEDURE — 94640 AIRWAY INHALATION TREATMENT: CPT | Mod: 76

## 2018-07-31 PROCEDURE — 25000125 ZZHC RX 250: Performed by: PHYSICIAN ASSISTANT

## 2018-07-31 PROCEDURE — 71046 X-RAY EXAM CHEST 2 VIEWS: CPT

## 2018-07-31 PROCEDURE — 25000128 H RX IP 250 OP 636: Performed by: STUDENT IN AN ORGANIZED HEALTH CARE EDUCATION/TRAINING PROGRAM

## 2018-07-31 PROCEDURE — 25000132 ZZH RX MED GY IP 250 OP 250 PS 637: Performed by: STUDENT IN AN ORGANIZED HEALTH CARE EDUCATION/TRAINING PROGRAM

## 2018-07-31 PROCEDURE — 12000008 ZZH R&B INTERMEDIATE UMMC

## 2018-07-31 PROCEDURE — 27210429 ZZH NUTRITION PRODUCT INTERMEDIATE LITER

## 2018-07-31 RX ADMIN — ACETAMINOPHEN 650 MG: 325 SOLUTION ORAL at 04:22

## 2018-07-31 RX ADMIN — POLYETHYLENE GLYCOL 3350 17 G: 17 POWDER, FOR SOLUTION ORAL at 20:24

## 2018-07-31 RX ADMIN — ACETAMINOPHEN 650 MG: 325 SOLUTION ORAL at 09:01

## 2018-07-31 RX ADMIN — PANTOPRAZOLE SODIUM 40 MG: 40 TABLET, DELAYED RELEASE ORAL at 09:02

## 2018-07-31 RX ADMIN — IPRATROPIUM BROMIDE AND ALBUTEROL SULFATE 3 ML: .5; 3 SOLUTION RESPIRATORY (INHALATION) at 00:55

## 2018-07-31 RX ADMIN — SODIUM CHLORIDE SOLN NEBU 3% 3 ML: 3 NEBU SOLN at 16:28

## 2018-07-31 RX ADMIN — OXYCODONE HYDROCHLORIDE 10 MG: 5 SOLUTION ORAL at 04:22

## 2018-07-31 RX ADMIN — ENOXAPARIN SODIUM 40 MG: 100 INJECTION SUBCUTANEOUS at 09:02

## 2018-07-31 RX ADMIN — OXYCODONE HYDROCHLORIDE 10 MG: 5 SOLUTION ORAL at 18:15

## 2018-07-31 RX ADMIN — ACETAMINOPHEN 650 MG: 325 SOLUTION ORAL at 13:08

## 2018-07-31 RX ADMIN — ACETAMINOPHEN 650 MG: 325 SOLUTION ORAL at 00:10

## 2018-07-31 RX ADMIN — ONDANSETRON HYDROCHLORIDE 4 MG: 2 INJECTION, SOLUTION INTRAMUSCULAR; INTRAVENOUS at 22:31

## 2018-07-31 RX ADMIN — IPRATROPIUM BROMIDE AND ALBUTEROL SULFATE 3 ML: .5; 3 SOLUTION RESPIRATORY (INHALATION) at 20:06

## 2018-07-31 RX ADMIN — MULTIVITAMIN 15 ML: LIQUID ORAL at 09:02

## 2018-07-31 RX ADMIN — OXYCODONE HYDROCHLORIDE 10 MG: 5 SOLUTION ORAL at 13:08

## 2018-07-31 RX ADMIN — SODIUM CHLORIDE SOLN NEBU 3% 3 ML: 3 NEBU SOLN at 00:55

## 2018-07-31 RX ADMIN — IPRATROPIUM BROMIDE AND ALBUTEROL SULFATE 3 ML: .5; 3 SOLUTION RESPIRATORY (INHALATION) at 08:48

## 2018-07-31 RX ADMIN — SODIUM CHLORIDE SOLN NEBU 3% 3 ML: 3 NEBU SOLN at 20:06

## 2018-07-31 RX ADMIN — ACETAMINOPHEN 650 MG: 325 SOLUTION ORAL at 17:04

## 2018-07-31 RX ADMIN — IPRATROPIUM BROMIDE AND ALBUTEROL SULFATE 3 ML: .5; 3 SOLUTION RESPIRATORY (INHALATION) at 16:28

## 2018-07-31 RX ADMIN — OXYCODONE HYDROCHLORIDE 10 MG: 5 SOLUTION ORAL at 22:18

## 2018-07-31 RX ADMIN — SODIUM CHLORIDE SOLN NEBU 3% 3 ML: 3 NEBU SOLN at 08:48

## 2018-07-31 RX ADMIN — OXYCODONE HYDROCHLORIDE 10 MG: 5 SOLUTION ORAL at 00:10

## 2018-07-31 RX ADMIN — ACETAMINOPHEN 650 MG: 325 SOLUTION ORAL at 22:18

## 2018-07-31 RX ADMIN — OXYCODONE HYDROCHLORIDE 10 MG: 5 SOLUTION ORAL at 09:02

## 2018-07-31 ASSESSMENT — ACTIVITIES OF DAILY LIVING (ADL)
ADLS_ACUITY_SCORE: 11

## 2018-07-31 NOTE — PLAN OF CARE
"Problem: Patient Care Overview  Goal: Plan of Care/Patient Progress Review  Outcome: No Change  /69 (BP Location: Right arm)  Pulse 92  Temp 98.5  F (36.9  C) (Oral)  Resp 20  Ht 1.676 m (5' 6\")  Wt 79.6 kg (175 lb 6.4 oz)  SpO2 95%  BMI 28.31 kg/m2  7668-9094:  A&Ox4. Afebrile. VSS on RA. C/o Abdominal pain, distention, and bloating; oxycodone PRN and scheduled Tylenol given Q4H. Miralax administered as pt reports only small amount of liquid stool and feels constipated. G tube to gravity drainage bag. J tube used for meds and TF started as cycled from 8pm-8am. TF rate 110 ml/hr with 100 ml free water flushes Q4H. Left chest tube to water seal with no additional output; dressing CDI. Chest xray and swallow study results are back. Clear liquid diet ordered; however no order to clamp G-tube. ; HS . Up ad karo; ambulating halls independently. Continue to monitor and follow POC.       "

## 2018-07-31 NOTE — PLAN OF CARE
Problem: Surgery Nonspecified (Adult)  Goal: Signs and Symptoms of Listed Potential Problems Will be Absent, Minimized or Managed (Surgery Nonspecified)  Signs and symptoms of listed potential problems will be absent, minimized or managed by discharge/transition of care (reference Surgery Nonspecified (Adult) CPG).   Outcome: No Change  Neuro: A&Ox4.   Cardiac: VSS.  Afebrile  Respiratory: Sating 96% on RA. LS  crackles in base, Coarse L lobe  GI/: Voiding spont in urinal. BM yesterday  Diet/appetite: Clear liquid diet. Pt c/o liquids getting caught in throat. MD notified and will come assess. BG 92  Activity:  independent  Pain: At acceptable level on current regimen. Tylenol and oxycodone q4h  Skin: Incision JER, Chest tube dressing CDI.   LDA's: G and J tube clamped. Meds given through J tube. TF cycled at goal of 110 8pm-8am    Plan: Continue with POC. Notify primary team with changes.     07/31/18 1600   Surgery Nonspecified   Problems Assessed (Surgery) all   Problems Present (Surgery) pain

## 2018-07-31 NOTE — PLAN OF CARE
Problem: Patient Care Overview  Goal: Plan of Care/Patient Progress Review  Outcome: Improving  Vital signs:  Temp: 98.4  F (36.9  C) Temp src: Oral BP: 121/61 Pulse: 92 Heart Rate: 97 Resp: 18 SpO2: 94 % O2 Device: None (Room air)   VSS. L chest tube to water seal, no new output. G-tube to gravity with small green output. J-tube with tube feedings @ 110 mL/hr. Pt states pain is well controlled with oxycodone and tylenol q4h. 0200 . Up independently in room and halls. Appears to sleep between cares.

## 2018-07-31 NOTE — PLAN OF CARE
"Problem: Patient Care Overview  Goal: Plan of Care/Patient Progress Review  Outcome: Improving  ../76 (BP Location: Right arm)  Pulse 92  Temp 99.2  F (37.3  C) (Oral)  Resp 18  Ht 1.676 m (5' 6\")  Wt 79.6 kg (175 lb 6.4 oz)  SpO2 95%  BMI 28.31 kg/m2  A&O. Up ad karo and sitting in chair most of the day. Went for chest x-ray,CT to water seal, Drsg reinforced and pt stated that he was told this tube maybe removed today. Paged team with questions about IV fluids, removal of CT and that pt c/o indigestion at present and wondering if G-tube should be unclamped/vented. Pt does have left chest tube insertion site pain and incisional pain which is decreased by both tylenol and Oxy. Clear liquid diet and voiding well. Education done on S&S of infection and demo of G-J tube site cares and new drsg applied. Continue with plan of cares and medicate per orders.      "

## 2018-07-31 NOTE — PROGRESS NOTES
"Thoracic Surgery Progress Note  07/31/2018    Subjective:  Patient had gastrostomy tube clamped this morning and patient was started on a clear liquid diet. He noticed he was having difficulty swallowing with fluids getting \"stuck in his throat\". Patient has had a history of feeling like something was stuck in his throat but not from liquids. Denies any nausea or vomiting.     Objective:  /75 (BP Location: Right arm)  Pulse 92  Temp 98.9  F (37.2  C) (Oral)  Resp 18  Ht 1.676 m (5' 6\")  Wt 79.6 kg (175 lb 6.4 oz)  SpO2 96%  BMI 28.31 kg/m2    I/O last 3 completed shifts:  In: 2830 [P.O.:1200; I.V.:70; NG/GT:460]  Out: 3405 [Urine:1625; Emesis/NG output:1775; Chest Tube:5]    A&Ox3, NAD  Breathing non-labored on room air  Incisions C/D/I  Left CT with serosang output, no air leak.  Soft, NTND, GJ tube in place  Distal extremities warm    Esophagram 7/30/18: Patent esophageal stent, no evidence of leak or obstruction    Assessment/Plan:  37M now s/p esophageal perforation s/p emergent EGD, PEGJ placement, left thoracotomy with primary esophageal repair and intercostal muscle flap reinforcement, esophageal stent, bilateral chest tubes on 7/21/18.    - Patient feels like liquids get stuck in throat, most likely due to his esophageal stent, will continue to monitor.  - Esophagram: no evidence of leak or obstruction  - Patient started on CLD and G-tube was clamped  - F/U CXR in AM and possible DC left chest tube  - L angled CT in place  - Pulm toilet  - Cycle TFs per nutrition recommendations   - SQH ppx     Sunny Bowling MD  General Surgery PGY-1  "

## 2018-08-01 ENCOUNTER — APPOINTMENT (OUTPATIENT)
Dept: GENERAL RADIOLOGY | Facility: CLINIC | Age: 38
End: 2018-08-01
Attending: THORACIC SURGERY (CARDIOTHORACIC VASCULAR SURGERY)
Payer: COMMERCIAL

## 2018-08-01 ENCOUNTER — HOME INFUSION (PRE-WILLOW HOME INFUSION) (OUTPATIENT)
Dept: PHARMACY | Facility: CLINIC | Age: 38
End: 2018-08-01

## 2018-08-01 ENCOUNTER — APPOINTMENT (OUTPATIENT)
Dept: GENERAL RADIOLOGY | Facility: CLINIC | Age: 38
End: 2018-08-01
Attending: STUDENT IN AN ORGANIZED HEALTH CARE EDUCATION/TRAINING PROGRAM
Payer: COMMERCIAL

## 2018-08-01 LAB
ANION GAP SERPL CALCULATED.3IONS-SCNC: 5 MMOL/L (ref 3–14)
BUN SERPL-MCNC: 16 MG/DL (ref 7–30)
CALCIUM SERPL-MCNC: 9.4 MG/DL (ref 8.5–10.1)
CHLORIDE SERPL-SCNC: 101 MMOL/L (ref 94–109)
CO2 SERPL-SCNC: 28 MMOL/L (ref 20–32)
CREAT SERPL-MCNC: 0.94 MG/DL (ref 0.66–1.25)
ERYTHROCYTE [DISTWIDTH] IN BLOOD BY AUTOMATED COUNT: 13.1 % (ref 10–15)
GFR SERPL CREATININE-BSD FRML MDRD: 89 ML/MIN/1.7M2
GLUCOSE BLDC GLUCOMTR-MCNC: 102 MG/DL (ref 70–99)
GLUCOSE BLDC GLUCOMTR-MCNC: 102 MG/DL (ref 70–99)
GLUCOSE BLDC GLUCOMTR-MCNC: 111 MG/DL (ref 70–99)
GLUCOSE BLDC GLUCOMTR-MCNC: 124 MG/DL (ref 70–99)
GLUCOSE BLDC GLUCOMTR-MCNC: 138 MG/DL (ref 70–99)
GLUCOSE BLDC GLUCOMTR-MCNC: 99 MG/DL (ref 70–99)
GLUCOSE SERPL-MCNC: 120 MG/DL (ref 70–99)
HCT VFR BLD AUTO: 40.5 % (ref 40–53)
HGB BLD-MCNC: 13.5 G/DL (ref 13.3–17.7)
MAGNESIUM SERPL-MCNC: 2.3 MG/DL (ref 1.6–2.3)
MCH RBC QN AUTO: 29.2 PG (ref 26.5–33)
MCHC RBC AUTO-ENTMCNC: 33.3 G/DL (ref 31.5–36.5)
MCV RBC AUTO: 88 FL (ref 78–100)
PHOSPHATE SERPL-MCNC: 3 MG/DL (ref 2.5–4.5)
PLATELET # BLD AUTO: 445 10E9/L (ref 150–450)
POTASSIUM SERPL-SCNC: 4.2 MMOL/L (ref 3.4–5.3)
RBC # BLD AUTO: 4.62 10E12/L (ref 4.4–5.9)
SODIUM SERPL-SCNC: 134 MMOL/L (ref 133–144)
WBC # BLD AUTO: 11.1 10E9/L (ref 4–11)

## 2018-08-01 PROCEDURE — 71046 X-RAY EXAM CHEST 2 VIEWS: CPT | Mod: 77

## 2018-08-01 PROCEDURE — 25000132 ZZH RX MED GY IP 250 OP 250 PS 637: Performed by: STUDENT IN AN ORGANIZED HEALTH CARE EDUCATION/TRAINING PROGRAM

## 2018-08-01 PROCEDURE — 83735 ASSAY OF MAGNESIUM: CPT | Performed by: STUDENT IN AN ORGANIZED HEALTH CARE EDUCATION/TRAINING PROGRAM

## 2018-08-01 PROCEDURE — 40000274 ZZH STATISTIC RCP CONSULT EA 30 MIN

## 2018-08-01 PROCEDURE — 25000125 ZZHC RX 250: Performed by: PHYSICIAN ASSISTANT

## 2018-08-01 PROCEDURE — 27210429 ZZH NUTRITION PRODUCT INTERMEDIATE LITER

## 2018-08-01 PROCEDURE — 25000128 H RX IP 250 OP 636: Performed by: STUDENT IN AN ORGANIZED HEALTH CARE EDUCATION/TRAINING PROGRAM

## 2018-08-01 PROCEDURE — 00000146 ZZHCL STATISTIC GLUCOSE BY METER IP

## 2018-08-01 PROCEDURE — 71046 X-RAY EXAM CHEST 2 VIEWS: CPT

## 2018-08-01 PROCEDURE — 84100 ASSAY OF PHOSPHORUS: CPT | Performed by: STUDENT IN AN ORGANIZED HEALTH CARE EDUCATION/TRAINING PROGRAM

## 2018-08-01 PROCEDURE — 85027 COMPLETE CBC AUTOMATED: CPT | Performed by: STUDENT IN AN ORGANIZED HEALTH CARE EDUCATION/TRAINING PROGRAM

## 2018-08-01 PROCEDURE — 94640 AIRWAY INHALATION TREATMENT: CPT

## 2018-08-01 PROCEDURE — 36415 COLL VENOUS BLD VENIPUNCTURE: CPT | Performed by: STUDENT IN AN ORGANIZED HEALTH CARE EDUCATION/TRAINING PROGRAM

## 2018-08-01 PROCEDURE — 12000008 ZZH R&B INTERMEDIATE UMMC

## 2018-08-01 PROCEDURE — 80048 BASIC METABOLIC PNL TOTAL CA: CPT | Performed by: STUDENT IN AN ORGANIZED HEALTH CARE EDUCATION/TRAINING PROGRAM

## 2018-08-01 PROCEDURE — 25000132 ZZH RX MED GY IP 250 OP 250 PS 637: Performed by: THORACIC SURGERY (CARDIOTHORACIC VASCULAR SURGERY)

## 2018-08-01 PROCEDURE — 25000132 ZZH RX MED GY IP 250 OP 250 PS 637: Performed by: PHYSICIAN ASSISTANT

## 2018-08-01 RX ORDER — ONDANSETRON 4 MG/1
4 TABLET, ORALLY DISINTEGRATING ORAL EVERY 6 HOURS PRN
Qty: 120 TABLET | Refills: 0 | Status: SHIPPED | OUTPATIENT
Start: 2018-08-01 | End: 2019-04-04

## 2018-08-01 RX ORDER — OXYCODONE HCL 5 MG/5 ML
5-10 SOLUTION, ORAL ORAL EVERY 4 HOURS PRN
Qty: 350 ML | Refills: 0 | Status: SHIPPED | OUTPATIENT
Start: 2018-08-01 | End: 2019-04-04

## 2018-08-01 RX ORDER — PROCHLORPERAZINE MALEATE 10 MG
10 TABLET ORAL EVERY 6 HOURS PRN
Qty: 90 TABLET | Refills: 0 | Status: SHIPPED | OUTPATIENT
Start: 2018-08-01 | End: 2019-04-04

## 2018-08-01 RX ORDER — IPRATROPIUM BROMIDE AND ALBUTEROL SULFATE 2.5; .5 MG/3ML; MG/3ML
3 SOLUTION RESPIRATORY (INHALATION) EVERY 4 HOURS PRN
Status: DISCONTINUED | OUTPATIENT
Start: 2018-08-01 | End: 2018-08-02 | Stop reason: HOSPADM

## 2018-08-01 RX ORDER — AMOXICILLIN 250 MG
2 CAPSULE ORAL 2 TIMES DAILY
Qty: 120 TABLET | Refills: 1 | Status: SHIPPED | OUTPATIENT
Start: 2018-08-01 | End: 2019-04-04

## 2018-08-01 RX ADMIN — ACETAMINOPHEN 650 MG: 325 SOLUTION ORAL at 10:14

## 2018-08-01 RX ADMIN — IPRATROPIUM BROMIDE AND ALBUTEROL SULFATE 3 ML: .5; 3 SOLUTION RESPIRATORY (INHALATION) at 08:18

## 2018-08-01 RX ADMIN — OXYCODONE HYDROCHLORIDE 10 MG: 5 SOLUTION ORAL at 02:18

## 2018-08-01 RX ADMIN — OXYCODONE HYDROCHLORIDE 10 MG: 5 SOLUTION ORAL at 06:07

## 2018-08-01 RX ADMIN — ACETAMINOPHEN 650 MG: 325 SOLUTION ORAL at 02:18

## 2018-08-01 RX ADMIN — OXYCODONE HYDROCHLORIDE 10 MG: 5 SOLUTION ORAL at 14:24

## 2018-08-01 RX ADMIN — OXYCODONE HYDROCHLORIDE 10 MG: 5 SOLUTION ORAL at 10:14

## 2018-08-01 RX ADMIN — ENOXAPARIN SODIUM 40 MG: 100 INJECTION SUBCUTANEOUS at 09:17

## 2018-08-01 RX ADMIN — ACETAMINOPHEN 650 MG: 325 SOLUTION ORAL at 14:24

## 2018-08-01 RX ADMIN — ACETAMINOPHEN 650 MG: 325 SOLUTION ORAL at 19:33

## 2018-08-01 RX ADMIN — SODIUM CHLORIDE SOLN NEBU 3% 3 ML: 3 NEBU SOLN at 08:18

## 2018-08-01 RX ADMIN — OXYCODONE HYDROCHLORIDE 10 MG: 5 SOLUTION ORAL at 19:33

## 2018-08-01 RX ADMIN — PANTOPRAZOLE SODIUM 40 MG: 40 TABLET, DELAYED RELEASE ORAL at 09:17

## 2018-08-01 RX ADMIN — POLYETHYLENE GLYCOL 3350 17 G: 17 POWDER, FOR SOLUTION ORAL at 09:17

## 2018-08-01 RX ADMIN — ACETAMINOPHEN 650 MG: 325 SOLUTION ORAL at 06:07

## 2018-08-01 RX ADMIN — POLYETHYLENE GLYCOL 3350 17 G: 17 POWDER, FOR SOLUTION ORAL at 20:23

## 2018-08-01 RX ADMIN — MULTIVITAMIN 15 ML: LIQUID ORAL at 09:17

## 2018-08-01 ASSESSMENT — ACTIVITIES OF DAILY LIVING (ADL)
ADLS_ACUITY_SCORE: 11

## 2018-08-01 NOTE — PLAN OF CARE
Problem: Patient Care Overview  Goal: Plan of Care/Patient Progress Review  Outcome: Improving  VSS.   GI/: Pt voiding, small BM this AM  Incisions/Drains: L ch incision CDI JER. CT removed at 1345, Ch XR complete. LS diminished. Pt has blisters on L chest from tape irritation and R chest scab. G tube clamped. J tube flushed with meds. TF disconnected this AM.   IV: PIV SL.   Activity: up ad karo  PRN meds: oxy and tylenol given for pain together. Pt c/o chest pressure with inhalation this AM, MD notified. Resolved. Pt c/o dry mouth with thick saliva. Yonker suction.

## 2018-08-01 NOTE — DISCHARGE SUMMARY
NAME: Tate Constantino   MRN: [id number]   : 1980     DATE OF ADMISSION: 2018     PRE/POSTOPERATIVE DIAGNOSES: Esophageal Perforation    PROCEDURES PERFORMED: EGD, PEGJ placement, esophageal stent, left thoracotomy with bilateral chest tubes    PATHOLOGY RESULTS: None    CULTURE RESULTS: None     INTRAOPERATIVE COMPLICATIONS: None     POSTOPERATIVE COMPLICATIONS: None     DRAINS/TUBES PRESENT AT DISCHARGE: GJ tube    DATE OF DISCHARGE:  2018     HOSPITAL COURSE: Tate Constantino is a 37 year old male who on 2018 underwent the above-named procedures.  He tolerated the operation well and postoperatively was transferred to the general post-surgical unit.  The remainder of his course was essentially uncomplicated.  Prior to discharge, his pain was controlled well, he was able to perform ADLs and ambulate independently without difficulty, and had full return of bowel and bladder function.  On 2018, he was discharged to home in stable condition with GJ tube in place.    DISCHARGE EXAM:   A&O, NAD  Resp non-labored  Distal extremities warm    Incisions c/d/i     DISCHARGE INSTRUCTIONS:    Discharge Procedure Orders  Home infusion referral     Follow Up and recommended labs and tests   Order Comments: 1.) Follow up with primary care physician, No Ref-Primary, Physician, in 1-2 weeks.  2.) Follow up with a thoracic surgery Clinical Nurse Specialist in Thoracic Surgery clinic in 2 weeks in the OR with Dr Copeland with EGD and possible stent replacement.     Discharge Instructions   Order Comments: THORACIC SURGERY DISCHARGE INSTRUCTIONS    DIET: Full liquids, advance as tolerated    If your plans upon discharge include prolonged periods of sitting (i.e a lengthy car or plane ride), it is highly beneficial to get up and walk at least once per hour to help prevent swelling and blood clots.     You may remove chest tube dressing 48 hours after tube removal and bandage the site at your own discretion  "thereafter.  Small amounts of leakage are normal for 2-3 days after removal.  Feel free to call with questions.    You may get incision wet 2 days after operation. Do not submerge, soak, or scrub incision or swim until seen in follow-up.    Take incentive spirometer home for continued frequent use    Activity as tolerated, no strenous activity until seen in follow-up, no lifting greater than 20 pounds for the next 2 weeks.    Stay hydrated. Take over the counter fiber (metamucil or benefiber) and stool softeners (Miralax, docusate or senna) if becoming constipated.     Call for fever greater than 101.5, chills, increased size of incision, red skin around incision, vision changes, muscle strength changes, sensation changes, shortness of breath, or other concerns.    No driving while taking narcotic pain medication.    Transition to ibuprofen or tylenol/acetaminophen for pain control. Do not take tylenol/acetaminophen and acetaminophen containing narcotic (e.g., percocet or vicodin) at the same time. If you have known ulcer problems, or kidney trouble (elevated creatinine) do not take the ibuprofen.    In emergencies, call 911    For other Questions or Concerns;  A.) During weekday working hours (Monday through Friday 8am to 4:30pm)  call [phone] and ask to speak to a clinical nurse specialist.    B.) At nights (after 4:30pm), on weekends, or if urgent call [phone] and  tell the  \"I would like to page job code [phone], the thoracic surgery  fellow on call, please.\"     Tubes   Order Comments: PEG TUBE INSTRUCTIONS: G tube portion    Venting:  -You should vent or temporarily put your tube to gravity bag (or basin) drainage if you experience nausea or bloating.  This is important as it will help to protect the hernia repair.   If you are uncertain how to do this, please ask your nurse for instruction.    Skin care:  -Change the gauze dressing around your PEG tube daily.  -Check for redness and " swelling in the area where the tube goes into your skin.   -Keep the skin around your tube dry and with a split gauze under the flange. If the hole where the tube enters your body is draining fluid, you may need to put barrier cream or antibiotic cream on the skin around your tube after you are done cleaning it. Cover the area with bandages, and call your caregiver.   -If there are no stitches holding your PEG tube in place on your skin, gently turn the tube. This may decrease pressure on your skin, and help prevent an infection. Ask your caregiver if you should turn your PEG tube, and how to do it correctly.     Flushes:  -Flush your feeding tube with 30cc of water twice daily to ensure it does not become plugged  -If administering medications or tube feedings via your tube, make sure to flush with water immediately after use to prevent the tube from plugging     Tubes   Order Comments: J TUBE INSTRUCTIONS: for tube feeds    Flush your feeding tube with 30cc of water;  1. After medication administration through the feeding tube  2. Before and after tube feeds  3. Every 8 hours while awake if you have not used the tube during that time     Adult Formula Drip Feeding   Order Comments: Isosource 1.5 @ goal 110 ml/hr x 12 hours         DISCHARGE MEDICATIONS:   Current Discharge Medication List      START taking these medications    Details   acetaminophen (TYLENOL) 32 mg/mL solution 20.3 mLs (650 mg) by Per J Tube route every 4 hours  Qty: 400 mL, Refills: 1    Associated Diagnoses: Esophageal perforation      ondansetron (ZOFRAN-ODT) 4 MG ODT tab Take 1 tablet (4 mg) by mouth every 6 hours as needed for nausea or vomiting  Qty: 120 tablet, Refills: 0    Associated Diagnoses: Esophageal perforation      oxyCODONE (ROXICODONE) 5 MG/5ML solution 5-10 mLs (5-10 mg) by Per J Tube route every 4 hours as needed for moderate to severe pain  Qty: 350 mL, Refills: 0    Associated Diagnoses: Esophageal perforation       pantoprazole (PROTONIX) 2 mg/mL SUSP suspension 20 mLs (40 mg) by Per Feeding Tube route daily  Qty: 100 mL, Refills: 1    Associated Diagnoses: Esophageal perforation      prochlorperazine (COMPAZINE) 10 MG tablet Take 1 tablet (10 mg) by mouth every 6 hours as needed for nausea or vomiting  Qty: 90 tablet, Refills: 0    Associated Diagnoses: Esophageal perforation      senna-docusate (SENOKOT-S;PERICOLACE) 8.6-50 MG per tablet Take 2 tablets by mouth 2 times daily  Qty: 120 tablet, Refills: 1    Associated Diagnoses: Bowel habit changes         CONTINUE these medications which have NOT CHANGED    Details   ePHEDrine-GuaiFENesin (BRONKAID PO) Take 12.524 mg by mouth as needed (for phlegm)      Multiple Vitamin (MULTIVITAMIN  S) CAPS Take 1 capsule by mouth daily         STOP taking these medications       caffeine (NO-DOZE) 200 MG TABS tablet Comments:   Reason for Stopping:

## 2018-08-01 NOTE — PROGRESS NOTES
"Thoracic Surgery Progress Note  08/01/2018    Subjective:  NAEON. Patient doing well. Tolerating CLD. BM overnight. Looking forward to going home soon.    Objective:  /64 (BP Location: Right arm)  Pulse 92  Temp 97.8  F (36.6  C) (Oral)  Resp 18  Ht 1.676 m (5' 6\")  Wt 79.4 kg (175 lb)  SpO2 97%  BMI 28.25 kg/m2    I/O last 3 completed shifts:  In: 1690 [P.O.:200; NG/GT:390]  Out: 2040 [Urine:1875; Emesis/NG output:150; Chest Tube:15]    A&Ox3, NAD  Breathing non-labored on room air  Incisions C/D/I  Left CT to water seal  Soft, NTND, GJ tube in place  Distal extremities warm    Esophagram 7/30/18: Patent esophageal stent, no evidence of leak or obstruction    Assessment/Plan:  37M now s/p esophageal perforation s/p emergent EGD, PEGJ placement, left thoracotomy with primary esophageal repair and intercostal muscle flap reinforcement, esophageal stent, bilateral chest tubes on 7/21/18.      - Patient started on CLD and G-tube was clamped  - F/U CXR and DC left chest tube  - Plan to DC possibly 8/2 or 8/3  - Esophagram: no evidence of leak or obstruction  - Pulm toilet  - Cycle TFs per nutrition recommendations   - SQH ppx     Sunny Bowling MD  General Surgery PGY-1  "

## 2018-08-01 NOTE — PROGRESS NOTES
Home Infusion  Tate is expected to dc within a few days and will be going home on cycled enteral feeds.  He has never done home enteral therapy before however he and hisparents have had some initial teaching by [facility] nurse.  Cookie Subramanian will be providing home nursing services.  Met with Tate at bedside and provided him with information about [facility] services.  Explained about administration method via the Valeriy pump and use of backpack for mobility as well as arrangements for [facility] to provide nursing services.  If they are unable to see Tate on day of discharge I will provide additional teaching necessary for him to be independent.   Informed him about supplies and delivery of supplies, storage of formula, plan for SNV and 24/7 availability of I staff while on enteral therapy.   If possible, depending on timing of discharge, [facility] would like to deliver some formula and supplies to the hospital on day of discharge for Tate to take home.    Tate verbalized understanding of all information given.  He is willing and able to learn and manage home enteral therapy and asked relevant questions.   Will continue to follow and revisit pt once dc plans are confirmed.    Tamika Bourgeois Home Infusion Liaison  [phone] pager

## 2018-08-01 NOTE — PROGRESS NOTES
CLINICAL NUTRITION SERVICES - REASSESSMENT NOTE     Nutrition Prescription    RECOMMENDATIONS FOR MDs/PROVIDERS TO ORDER:  1. Consider redrawing Phos, Mg, and K+ as labs have not been drawn since 7/27 and patient is on TFs.     Malnutrition Status:    Patient does not meet two of the above criteria necessary for diagnosing malnutrition    Recommendations already ordered by Registered Dietitian (RD):  None at this time.     Future/Additional Recommendations:  1. Consider offering patient Boost Breeze while on Clear Liquids for aid in PO intake.        EVALUATION OF THE PROGRESS TOWARD GOALS   Diet: Clear Liquids     Nutrition Support: Isosource 1.5 @ goal 110 ml/hr x 12 hours (1320 ml/day) to provide 1980 kcals (28 kcal/kg/day), 90 g PRO (1.3 g/kg/day), 1003 ml free H2O, 232 g CHO and 20 g Fiber daily.    Free Flush Water: 100 ml q4h      Per I/Os: 7-day TF infusion average of 1056 ml daily providing 1584 kcals (23 kcal/kg/day) and 72 g PRO (1.02 g/kg/day). Meeting 85-90% of est energy/protein needs.   *Suspect I/Os may be inaccurate as bedside RN notes reporting TFs running at goal for past 5-7 days.       NEW FINDINGS   Weight: 8% wt loss since admit date on 7/21 (1.5 weeks), suspect fluid related, at least in part.     GI:  1-2 loose stools/day since 7/28.     Labs: No new labs since 7/27.    MALNUTRITION  % Intake: No decreased intake noted  % Weight Loss: > 2% in 1 week (severe)  Subcutaneous Fat Loss: Unable to assess-- pt at CT (RD note on 7/22 states none observed)  Muscle Loss: Unable to assess--pt at CT (RD note on 7/22 states none observed)  Fluid Accumulation/Edema: Trace   Malnutrition Diagnosis: Patient does not meet two of the above criteria necessary for diagnosing malnutrition    Previous Goals   Total avg nutritional intake to meet a minimum of 25 kcal/kg and 1.2 g PRO/kg daily (per dosing wt 70 kg).  Evaluation: Met    Previous Nutrition Diagnosis  Predicted inadequate nutrient intake  (protein-energy) related to advanced to goal TF rate 2 days ago and tolerating but potential for TF interruptions  Evaluation: No change, updated below.     CURRENT NUTRITION DIAGNOSIS  Predicted inadequate nutrient intake (protein-energy) related to pt reliant on TFs to meet at least 75% of estimated needs with slowly advancing diet, but potential for interruptions to TFs.      INTERVENTIONS  Implementation  Unable to provide education due to patient at CT scan.     Goals  Total avg nutritional intake to meet a minimum of 25 kcal/kg and 1.2 g PRO/kg daily (per dosing wt 70 kg).    Monitoring/Evaluation  Progress toward goals will be monitored and evaluated per protocol.    Lashae Turcios, RD, LD   7B floor pager [phone]

## 2018-08-01 NOTE — PLAN OF CARE
Problem: Patient Care Overview  Goal: Plan of Care/Patient Progress Review  Outcome: No Change  Vital signs:  Temp: 97.8  F (36.6  C) Temp src: Oral BP: 120/64   Heart Rate: 84 Resp: 18 SpO2: 97 % O2 Device: None (Room air)     8594-3368. VSS on RA. Left PIV SL. Left CT to waterseal with 15 mL output. Left chest incision with liquid bandage, JER, no drainage. Gtube clamped. Jtube with TF @ goal 110mL/hr, runs from 8p-8a. Pt c/o pain, oxy given x3 along with scheduled tylenol. Pt c/o nausea, aromatherapy utilized, zofran given x1 with some relief.  and 124. Up independently. Voiding adequate amounts via urinal. +BS, passing flatus, no BM overnight. Sleeping between cares. Plan to possibly remove left CT today.

## 2018-08-02 ENCOUNTER — APPOINTMENT (OUTPATIENT)
Dept: SPEECH THERAPY | Facility: CLINIC | Age: 38
End: 2018-08-02
Attending: STUDENT IN AN ORGANIZED HEALTH CARE EDUCATION/TRAINING PROGRAM
Payer: COMMERCIAL

## 2018-08-02 ENCOUNTER — APPOINTMENT (OUTPATIENT)
Dept: GENERAL RADIOLOGY | Facility: CLINIC | Age: 38
End: 2018-08-02
Attending: STUDENT IN AN ORGANIZED HEALTH CARE EDUCATION/TRAINING PROGRAM
Payer: COMMERCIAL

## 2018-08-02 ENCOUNTER — HOME INFUSION (PRE-WILLOW HOME INFUSION) (OUTPATIENT)
Dept: PHARMACY | Facility: CLINIC | Age: 38
End: 2018-08-02

## 2018-08-02 VITALS
BODY MASS INDEX: 28.12 KG/M2 | TEMPERATURE: 98.4 F | WEIGHT: 175 LBS | SYSTOLIC BLOOD PRESSURE: 139 MMHG | HEART RATE: 86 BPM | DIASTOLIC BLOOD PRESSURE: 69 MMHG | HEIGHT: 66 IN | OXYGEN SATURATION: 96 % | RESPIRATION RATE: 18 BRPM

## 2018-08-02 LAB
GLUCOSE BLDC GLUCOMTR-MCNC: 109 MG/DL (ref 70–99)
GLUCOSE BLDC GLUCOMTR-MCNC: 138 MG/DL (ref 70–99)
GLUCOSE BLDC GLUCOMTR-MCNC: 154 MG/DL (ref 70–99)
PLATELET # BLD AUTO: 487 10E9/L (ref 150–450)

## 2018-08-02 PROCEDURE — 92610 EVALUATE SWALLOWING FUNCTION: CPT | Mod: GN | Performed by: SPEECH-LANGUAGE PATHOLOGIST

## 2018-08-02 PROCEDURE — 36415 COLL VENOUS BLD VENIPUNCTURE: CPT | Performed by: THORACIC SURGERY (CARDIOTHORACIC VASCULAR SURGERY)

## 2018-08-02 PROCEDURE — 25000128 H RX IP 250 OP 636: Performed by: STUDENT IN AN ORGANIZED HEALTH CARE EDUCATION/TRAINING PROGRAM

## 2018-08-02 PROCEDURE — 25000132 ZZH RX MED GY IP 250 OP 250 PS 637: Performed by: THORACIC SURGERY (CARDIOTHORACIC VASCULAR SURGERY)

## 2018-08-02 PROCEDURE — 27210429 ZZH NUTRITION PRODUCT INTERMEDIATE LITER

## 2018-08-02 PROCEDURE — 40000225 ZZH STATISTIC SLP WARD VISIT: Performed by: SPEECH-LANGUAGE PATHOLOGIST

## 2018-08-02 PROCEDURE — 85049 AUTOMATED PLATELET COUNT: CPT | Performed by: THORACIC SURGERY (CARDIOTHORACIC VASCULAR SURGERY)

## 2018-08-02 PROCEDURE — 00000146 ZZHCL STATISTIC GLUCOSE BY METER IP

## 2018-08-02 PROCEDURE — 25000132 ZZH RX MED GY IP 250 OP 250 PS 637: Performed by: STUDENT IN AN ORGANIZED HEALTH CARE EDUCATION/TRAINING PROGRAM

## 2018-08-02 PROCEDURE — 71046 X-RAY EXAM CHEST 2 VIEWS: CPT

## 2018-08-02 RX ADMIN — ENOXAPARIN SODIUM 40 MG: 100 INJECTION SUBCUTANEOUS at 08:29

## 2018-08-02 RX ADMIN — MULTIVITAMIN 15 ML: LIQUID ORAL at 08:29

## 2018-08-02 RX ADMIN — ACETAMINOPHEN 650 MG: 325 SOLUTION ORAL at 00:24

## 2018-08-02 RX ADMIN — ACETAMINOPHEN 650 MG: 325 SOLUTION ORAL at 13:02

## 2018-08-02 RX ADMIN — ACETAMINOPHEN 650 MG: 325 SOLUTION ORAL at 08:29

## 2018-08-02 RX ADMIN — OXYCODONE HYDROCHLORIDE 10 MG: 5 SOLUTION ORAL at 00:24

## 2018-08-02 RX ADMIN — OXYCODONE HYDROCHLORIDE 10 MG: 5 SOLUTION ORAL at 13:02

## 2018-08-02 RX ADMIN — PANTOPRAZOLE SODIUM 40 MG: 40 TABLET, DELAYED RELEASE ORAL at 08:29

## 2018-08-02 RX ADMIN — OXYCODONE HYDROCHLORIDE 10 MG: 5 SOLUTION ORAL at 04:33

## 2018-08-02 RX ADMIN — OXYCODONE HYDROCHLORIDE 10 MG: 5 SOLUTION ORAL at 08:29

## 2018-08-02 RX ADMIN — ACETAMINOPHEN 650 MG: 325 SOLUTION ORAL at 04:34

## 2018-08-02 ASSESSMENT — ACTIVITIES OF DAILY LIVING (ADL)
ADLS_ACUITY_SCORE: 11

## 2018-08-02 NOTE — PLAN OF CARE
Problem: Patient Care Overview  Goal: Plan of Care/Patient Progress Review  Outcome: Improving  VSS.   GI/: Pt voiding, 2 BM this AM  Incisions/Drains: L ch incision CDI JER. CT removal site CDI. Ch XR complete. LS diminished. Pt has blisters on L chest from tape irritation and R chest scab. Mepilex applied and supplies with pt for home. G tube clamped. J tube flushed with meds. TF disconnected this AM.   IV: PIV SL.   Activity: up ad karo  PRN meds: oxy and tylenol given for pain together. Pt c/o dry mouth with thick saliva. Yonker suction.   Pt to dc at 9830-6613 when supplies arrives from home infusion. Pt parents will assist pt to dc. Meds ready at dc pharm. AVS signed and printed.

## 2018-08-02 NOTE — PROGRESS NOTES
08/02/18 1322   General Information   Onset Date 07/21/18   Start of Care Date 08/02/18   Referring Physician Sunny Bowlign MD    Patient Profile Review/OT: Additional Occupational Profile Info See Profile for full history and prior level of function   Patient/Family Goals Statement Goals not stated at the time of evaluation.     Swallowing Evaluation Bedside swallow evaluation   Behaviorial Observations WFL (within functional limits)   Mode of current nutrition Oral diet;PEG   Type of oral diet Other (Comment)  (Clear liquids )   Respiratory Status Room air   Comments Tate Cosntantino is a 37 year old male who on 7/21/2018 underwent the above-named procedures.  He tolerated the operation well and postoperatively was transferred to the general post-surgical unit.  The remainder of his course was essentially uncomplicated.  Prior to discharge, his pain was controlled well, he was able to perform ADLs and ambulate independently without difficulty, and had full return of bowel and bladder function.  Video swallow study completed 7/31/2018 with no deficits noted in oropharyngeal function.  Swallow evaluation ordered as pt with reduced tolerance of intake as well as reduced secretion management.  Pt describes baseline swallow issues indicated by solid foods getting stuck which occurs every few weeks.  Pt attributes this to poor mastication and fast rate of intake.  Thoracic MD team requesting evaluation due to increased difficulty.  Trials limited to clears per Thoracic team.  Pt reports no difficulty with swallowing upon initial diet advancement but increased difficulty after sleeping lying flat.     Clinical Swallow Evaluation   Oral Musculature generally intact   Structural Abnormalities none present   Dentition present and adequate   Secretion Management problems swallowing secretions  (Intermittently spitting secretions into cup at bedside )   Mucosal Quality adequate   Mandibular Strength and Mobility intact   Oral  Labial Strength and Mobility WFL   Lingual Strength and Mobility WFL   Laryngeal Function Cough;Throat clear;Swallow;Voicing initiated;Dry swallow palpated   Oral Musculature Comments WFL   Additional Documentation Yes   Clinical Swallow Eval: Thin Liquid Texture Trial   Mode of Presentation, Thin Liquids cup;spoon;straw;self-fed;fed by clinician   Volume of Liquid or Food Presented 6 oz water    Oral Phase of Swallow WFL   Pharyngeal Phase of Swallow intact;throat clearing   Diagnostic Statement Pt demonstrated timely pharyngeal swallow response with adequate laryngeal elevation.  Throat clearing observed x1 with pt intermittent describing sensation of items remaining in throat.  Burping noted persistently with pt reporting increase in burping since clamping of G-tube.     Esophageal Phase of Swallow   Patient reports or presents with symptoms of esophageal dysphagia Yes  (Excessive burping )   Swallow Eval: Clinical Impressions   Skilled Criteria for Therapy Intervention No problems identified which require skilled intervention   Functional Assessment Scale (FAS) 7   Dysphagia Outcome Severity Scale (CARLOTA) Level 7 - CARLOTA   Treatment Diagnosis functional oral-phayrngeal swallow   Diet texture recommendations Clear liquid  (ADAT per Thoracic MD team )   Recommended Feeding/Eating Techniques maintain upright posture during/after eating for 30 mins;small sips/bites   Predicted Duration of Therapy Intervention (days/wks) evaluation only   Anticipated Discharge Disposition home w/ assist   Risks and Benefits of Treatment have been explained. Yes   Patient, family and/or staff in agreement with Plan of Care Yes   Clinical Impression Comments Pt seen bedside for swallow evaluation per Thoracic MD team given onset of swallowing difficulty.  Pt reported ease of swallow until clamping of G-tube and lying flat overnight and currently c/o phelgm in throat and sensation of items remaining in throat after swallow.  Pt noted to  be intermittently spitting secretions in bedside cup.  Pt demonstrated functional oropharyngeal skills upon participation in bedside swallow evaluation.  Throat clearing observed x1 but no other overt s/s of aspiration observed.  Oral skills WNL and pharyngeal swallow response timely and strong.  Persistent burping noted after trials which pt reporting as happening since clamping of G-tube.  Given position of stent and report of lying flat overnight, question reflux impacting performance but concern for aspiration remains low.  Recommend continue to ADAT per thoracic MD team.  Pt should be upright for intake and remain upright following intake.  ST to sign off without further needs identified.     Total Evaluation Time   Total Evaluation Time (Minutes) 16

## 2018-08-02 NOTE — PLAN OF CARE
Problem: Patient Care Overview  Goal: Plan of Care/Patient Progress Review  Outcome: Declining  Vital signs:  Temp: 99.4  F (37.4  C) Temp src: Oral BP: 132/71   Heart Rate: 90 Resp: 18 SpO2: 95 % O2 Device: None (Room air)   Tmax 99.4, other VSS. Denies SOB. Patient C/O pain in abdomen and near old chest tube site, pt states it is controlled with oxycodone and tylenol q4h via J-tube. C/O nausea, G-tube vented for 4 hours with relief, green output from tube. TF's cycled infusing at 110 mL/hr via J-tube. Up independently, voiding adequate amounts, patient reports 2 loose BMs. Appears to sleep in recliner throughout the shift.

## 2018-08-02 NOTE — PLAN OF CARE
Problem: Patient Care Overview  Goal: Plan of Care/Patient Progress Review  Discharge Planner SLP   Patient plan for discharge: Home   Current status: Pt seen bedside for swallow evaluation per Thoracic MD team given onset of swallowing difficulty.  Pt reported ease of swallow until clamping of G-tube and lying flat overnight and currently c/o phelgm in throat and sensation of items remaining in throat after swallow.  Pt noted to be intermittently spitting secretions in bedside cup.  Pt demonstrated functional oropharyngeal skills upon participation in bedside swallow evaluation.  Throat clearing observed x1 but no other overt s/s of aspiration observed.  Oral skills WNL and pharyngeal swallow response timely and strong.  Persistent burping noted after trials which pt reporting as happening since clamping of G-tube.  Given position of stent and report of lying flat overnight, question reflux impacting performance but concern for aspiration remains low.  Recommend continue to ADAT per thoracic MD team.  Pt should be upright for intake and remain upright following intake.  ST to sign off without further needs identified.    Barriers to return to prior living situation: None   Recommendations for discharge: Home   Rationale for recommendations: Swallow function not impacting discharge disposition        Entered by: Treasure Ibarra 08/02/2018 1:37 PM

## 2018-08-03 ENCOUNTER — CARE COORDINATION (OUTPATIENT)
Dept: CARE COORDINATION | Facility: CLINIC | Age: 38
End: 2018-08-03

## 2018-08-03 NOTE — PROGRESS NOTES
This is a recent snapshot of the patient's [facility] medical record.  For current drug dose and complete information and questions, call [phone] or In Basket pool, fv home infusion ([id number])  CSN Number:  [id number]

## 2018-08-03 NOTE — PROGRESS NOTES
This is a recent snapshot of the patient's [facility] medical record.  For current drug dose and complete information and questions, call [phone] or In Basket pool, fv home infusion ([id number])  CSN Number:  [id number]

## 2018-08-06 ENCOUNTER — HOME INFUSION (PRE-WILLOW HOME INFUSION) (OUTPATIENT)
Dept: PHARMACY | Facility: CLINIC | Age: 38
End: 2018-08-06

## 2018-08-07 NOTE — PROGRESS NOTES
This is a recent snapshot of the patient's [facility] medical record.  For current drug dose and complete information and questions, call [phone] or In Basket pool, fv home infusion ([id number])  CSN Number:  [id number]

## 2018-08-14 ENCOUNTER — ANESTHESIA EVENT (OUTPATIENT)
Dept: SURGERY | Facility: CLINIC | Age: 38
End: 2018-08-14
Payer: COMMERCIAL

## 2018-08-14 NOTE — ANESTHESIA PREPROCEDURE EVALUATION
Anesthesia Evaluation     . Pt has had prior anesthetic. Type: General    No history of anesthetic complications          ROS/MED HX    ENT/Pulmonary:  - neg pulmonary ROS     Neurologic:  - neg neurologic ROS     Cardiovascular:  - neg cardiovascular ROS       METS/Exercise Tolerance:  >4 METS   Hematologic:  - neg hematologic  ROS       Musculoskeletal:  - neg musculoskeletal ROS       GI/Hepatic:     (+) Other GI/Hepatic Patient had esophageal perforation on 7/21/2018; s/p esophageal primary repair, esophageal stent placement, PEGJ placement.       Renal/Genitourinary:         Endo:  - neg endo ROS       Psychiatric: Comment: Denies recent drug/alcohol use - neg psychiatric ROS       Infectious Disease:  - neg infectious disease ROS       Malignancy:      - no malignancy   Other:    (+) No chance of pregnancy C-spine cleared: Yes, no H/O Chronic Pain,no other significant disability                    Physical Exam  Normal systems: cardiovascular and pulmonary    Airway   Mallampati: I  TM distance: >3 FB  Neck ROM: full    Dental   (+) chipped  Comment: Chipped upper and lower front teeth. No missing or loose teeth.    Cardiovascular       Pulmonary                     Anesthesia Plan      History & Physical Review  History and physical reviewed and following examination; no interval change.    ASA Status:  2 .    NPO Status:  > 8 hours    Plan for General, ETT and RSI with Intravenous and Propofol induction. Maintenance will be Balanced.    PONV prophylaxis:  Ondansetron (or other 5HT-3) and Dexamethasone or Solumedrol  Additional equipment: Videolaryngoscope CMAC due to patient having difficulty swallowing pre-op recently and feels unable to clear saliva. To observe if stent has migrated out.      Postoperative Care  Postoperative pain management:  IV analgesics.      Consents  Anesthetic plan, risks, benefits and alternatives discussed with:  Patient.  Use of blood products discussed: Yes.   Consented to  blood products.  .                  History and physical assessed; Patient examined. I have reviewed and agree with this pre-op assessment and anesthetic plan with addendums as necessary.     Risks and alternatives presented and discussed. Patient and family agree. All questions answered.      Jagdish Mayer MD  Staff Anesthesiologist  *[phone]

## 2018-08-15 ENCOUNTER — HOSPITAL ENCOUNTER (OUTPATIENT)
Facility: CLINIC | Age: 38
Discharge: HOME | End: 2018-08-15
Attending: STUDENT IN AN ORGANIZED HEALTH CARE EDUCATION/TRAINING PROGRAM | Admitting: STUDENT IN AN ORGANIZED HEALTH CARE EDUCATION/TRAINING PROGRAM
Payer: COMMERCIAL

## 2018-08-15 ENCOUNTER — APPOINTMENT (OUTPATIENT)
Dept: GENERAL RADIOLOGY | Facility: CLINIC | Age: 38
End: 2018-08-15
Attending: STUDENT IN AN ORGANIZED HEALTH CARE EDUCATION/TRAINING PROGRAM
Payer: COMMERCIAL

## 2018-08-15 ENCOUNTER — ANESTHESIA (OUTPATIENT)
Dept: SURGERY | Facility: CLINIC | Age: 38
End: 2018-08-15
Payer: COMMERCIAL

## 2018-08-15 VITALS
WEIGHT: 171.96 LBS | BODY MASS INDEX: 27.64 KG/M2 | HEIGHT: 66 IN | TEMPERATURE: 98.4 F | DIASTOLIC BLOOD PRESSURE: 69 MMHG | OXYGEN SATURATION: 96 % | RESPIRATION RATE: 18 BRPM | SYSTOLIC BLOOD PRESSURE: 117 MMHG

## 2018-08-15 DIAGNOSIS — K22.3 ESOPHAGEAL PERFORATION: Primary | ICD-10-CM

## 2018-08-15 LAB — GLUCOSE BLDC GLUCOMTR-MCNC: 105 MG/DL (ref 70–99)

## 2018-08-15 PROCEDURE — 25000566 ZZH SEVOFLURANE, EA 15 MIN: Performed by: STUDENT IN AN ORGANIZED HEALTH CARE EDUCATION/TRAINING PROGRAM

## 2018-08-15 PROCEDURE — 25000128 H RX IP 250 OP 636: Performed by: STUDENT IN AN ORGANIZED HEALTH CARE EDUCATION/TRAINING PROGRAM

## 2018-08-15 PROCEDURE — C1769 GUIDE WIRE: HCPCS | Performed by: STUDENT IN AN ORGANIZED HEALTH CARE EDUCATION/TRAINING PROGRAM

## 2018-08-15 PROCEDURE — 82962 GLUCOSE BLOOD TEST: CPT

## 2018-08-15 PROCEDURE — 71000027 ZZH RECOVERY PHASE 2 EACH 15 MINS: Performed by: STUDENT IN AN ORGANIZED HEALTH CARE EDUCATION/TRAINING PROGRAM

## 2018-08-15 PROCEDURE — 25000128 H RX IP 250 OP 636

## 2018-08-15 PROCEDURE — 27210794 ZZH OR GENERAL SUPPLY STERILE: Performed by: STUDENT IN AN ORGANIZED HEALTH CARE EDUCATION/TRAINING PROGRAM

## 2018-08-15 PROCEDURE — C9399 UNCLASSIFIED DRUGS OR BIOLOG: HCPCS

## 2018-08-15 PROCEDURE — 71000015 ZZH RECOVERY PHASE 1 LEVEL 2 EA ADDTL HR: Performed by: STUDENT IN AN ORGANIZED HEALTH CARE EDUCATION/TRAINING PROGRAM

## 2018-08-15 PROCEDURE — 40000278 XR SURGERY CARM FLUORO LESS THAN 5 MIN: Mod: TC

## 2018-08-15 PROCEDURE — 36000055 ZZH SURGERY LEVEL 2 W FLUORO 1ST 30 MIN - UMMC: Performed by: STUDENT IN AN ORGANIZED HEALTH CARE EDUCATION/TRAINING PROGRAM

## 2018-08-15 PROCEDURE — 36000053 ZZH SURGERY LEVEL 2 EA 15 ADDTL MIN - UMMC: Performed by: STUDENT IN AN ORGANIZED HEALTH CARE EDUCATION/TRAINING PROGRAM

## 2018-08-15 PROCEDURE — 25000125 ZZHC RX 250

## 2018-08-15 PROCEDURE — 71000014 ZZH RECOVERY PHASE 1 LEVEL 2 FIRST HR: Performed by: STUDENT IN AN ORGANIZED HEALTH CARE EDUCATION/TRAINING PROGRAM

## 2018-08-15 PROCEDURE — 37000008 ZZH ANESTHESIA TECHNICAL FEE, 1ST 30 MIN: Performed by: STUDENT IN AN ORGANIZED HEALTH CARE EDUCATION/TRAINING PROGRAM

## 2018-08-15 PROCEDURE — 37000009 ZZH ANESTHESIA TECHNICAL FEE, EACH ADDTL 15 MIN: Performed by: STUDENT IN AN ORGANIZED HEALTH CARE EDUCATION/TRAINING PROGRAM

## 2018-08-15 PROCEDURE — 40000170 ZZH STATISTIC PRE-PROCEDURE ASSESSMENT II: Performed by: STUDENT IN AN ORGANIZED HEALTH CARE EDUCATION/TRAINING PROGRAM

## 2018-08-15 PROCEDURE — 25000132 ZZH RX MED GY IP 250 OP 250 PS 637: Performed by: STUDENT IN AN ORGANIZED HEALTH CARE EDUCATION/TRAINING PROGRAM

## 2018-08-15 RX ORDER — OXYCODONE HYDROCHLORIDE 5 MG/1
5 TABLET ORAL
Status: DISCONTINUED | OUTPATIENT
Start: 2018-08-15 | End: 2018-08-15 | Stop reason: HOSPADM

## 2018-08-15 RX ORDER — LIDOCAINE HYDROCHLORIDE 20 MG/ML
INJECTION, SOLUTION INFILTRATION; PERINEURAL PRN
Status: DISCONTINUED | OUTPATIENT
Start: 2018-08-15 | End: 2018-08-15

## 2018-08-15 RX ORDER — GLYCOPYRROLATE 0.2 MG/ML
INJECTION, SOLUTION INTRAMUSCULAR; INTRAVENOUS PRN
Status: DISCONTINUED | OUTPATIENT
Start: 2018-08-15 | End: 2018-08-15

## 2018-08-15 RX ORDER — LABETALOL HYDROCHLORIDE 5 MG/ML
10 INJECTION, SOLUTION INTRAVENOUS
Status: DISCONTINUED | OUTPATIENT
Start: 2018-08-15 | End: 2018-08-15 | Stop reason: HOSPADM

## 2018-08-15 RX ORDER — IOPAMIDOL 755 MG/ML
INJECTION, SOLUTION INTRAVASCULAR PRN
Status: DISCONTINUED | OUTPATIENT
Start: 2018-08-15 | End: 2018-08-15 | Stop reason: HOSPADM

## 2018-08-15 RX ORDER — SODIUM CHLORIDE, SODIUM LACTATE, POTASSIUM CHLORIDE, CALCIUM CHLORIDE 600; 310; 30; 20 MG/100ML; MG/100ML; MG/100ML; MG/100ML
INJECTION, SOLUTION INTRAVENOUS CONTINUOUS
Status: DISCONTINUED | OUTPATIENT
Start: 2018-08-15 | End: 2018-08-15 | Stop reason: HOSPADM

## 2018-08-15 RX ORDER — PROPOFOL 10 MG/ML
INJECTION, EMULSION INTRAVENOUS PRN
Status: DISCONTINUED | OUTPATIENT
Start: 2018-08-15 | End: 2018-08-15

## 2018-08-15 RX ORDER — FENTANYL CITRATE 50 UG/ML
25-50 INJECTION, SOLUTION INTRAMUSCULAR; INTRAVENOUS
Status: DISCONTINUED | OUTPATIENT
Start: 2018-08-15 | End: 2018-08-15 | Stop reason: HOSPADM

## 2018-08-15 RX ORDER — ONDANSETRON 2 MG/ML
4 INJECTION INTRAMUSCULAR; INTRAVENOUS EVERY 30 MIN PRN
Status: DISCONTINUED | OUTPATIENT
Start: 2018-08-15 | End: 2018-08-15 | Stop reason: HOSPADM

## 2018-08-15 RX ORDER — OXYCODONE HCL 5 MG/5 ML
5 SOLUTION, ORAL ORAL ONCE
Status: DISCONTINUED | OUTPATIENT
Start: 2018-08-15 | End: 2018-08-15

## 2018-08-15 RX ORDER — ACETAMINOPHEN 325 MG/1
650 TABLET ORAL
Status: DISCONTINUED | OUTPATIENT
Start: 2018-08-15 | End: 2018-08-15 | Stop reason: HOSPADM

## 2018-08-15 RX ORDER — SODIUM CHLORIDE, SODIUM LACTATE, POTASSIUM CHLORIDE, CALCIUM CHLORIDE 600; 310; 30; 20 MG/100ML; MG/100ML; MG/100ML; MG/100ML
INJECTION, SOLUTION INTRAVENOUS CONTINUOUS PRN
Status: DISCONTINUED | OUTPATIENT
Start: 2018-08-15 | End: 2018-08-15

## 2018-08-15 RX ORDER — ONDANSETRON 2 MG/ML
INJECTION INTRAMUSCULAR; INTRAVENOUS PRN
Status: DISCONTINUED | OUTPATIENT
Start: 2018-08-15 | End: 2018-08-15

## 2018-08-15 RX ORDER — ONDANSETRON 4 MG/1
4 TABLET, ORALLY DISINTEGRATING ORAL EVERY 30 MIN PRN
Status: DISCONTINUED | OUTPATIENT
Start: 2018-08-15 | End: 2018-08-15 | Stop reason: HOSPADM

## 2018-08-15 RX ORDER — OXYCODONE HCL 5 MG/5 ML
5 SOLUTION, ORAL ORAL ONCE
Status: COMPLETED | OUTPATIENT
Start: 2018-08-15 | End: 2018-08-15

## 2018-08-15 RX ORDER — FENTANYL CITRATE 50 UG/ML
INJECTION, SOLUTION INTRAMUSCULAR; INTRAVENOUS PRN
Status: DISCONTINUED | OUTPATIENT
Start: 2018-08-15 | End: 2018-08-15

## 2018-08-15 RX ORDER — MEPERIDINE HYDROCHLORIDE 50 MG/ML
12.5 INJECTION INTRAMUSCULAR; INTRAVENOUS; SUBCUTANEOUS
Status: DISCONTINUED | OUTPATIENT
Start: 2018-08-15 | End: 2018-08-15 | Stop reason: HOSPADM

## 2018-08-15 RX ORDER — NALOXONE HYDROCHLORIDE 0.4 MG/ML
.1-.4 INJECTION, SOLUTION INTRAMUSCULAR; INTRAVENOUS; SUBCUTANEOUS
Status: DISCONTINUED | OUTPATIENT
Start: 2018-08-15 | End: 2018-08-15 | Stop reason: HOSPADM

## 2018-08-15 RX ORDER — DEXAMETHASONE SODIUM PHOSPHATE 4 MG/ML
INJECTION, SOLUTION INTRA-ARTICULAR; INTRALESIONAL; INTRAMUSCULAR; INTRAVENOUS; SOFT TISSUE PRN
Status: DISCONTINUED | OUTPATIENT
Start: 2018-08-15 | End: 2018-08-15

## 2018-08-15 RX ADMIN — FENTANYL CITRATE 25 MCG: 50 INJECTION, SOLUTION INTRAMUSCULAR; INTRAVENOUS at 11:08

## 2018-08-15 RX ADMIN — OXYCODONE HYDROCHLORIDE 5 MG: 5 SOLUTION ORAL at 13:02

## 2018-08-15 RX ADMIN — PROPOFOL 50 MG: 10 INJECTION, EMULSION INTRAVENOUS at 11:11

## 2018-08-15 RX ADMIN — ACETAMINOPHEN 975 MG: 325 SOLUTION ORAL at 12:57

## 2018-08-15 RX ADMIN — SODIUM CHLORIDE, POTASSIUM CHLORIDE, SODIUM LACTATE AND CALCIUM CHLORIDE: 600; 310; 30; 20 INJECTION, SOLUTION INTRAVENOUS at 11:01

## 2018-08-15 RX ADMIN — PROPOFOL 120 MG: 10 INJECTION, EMULSION INTRAVENOUS at 11:08

## 2018-08-15 RX ADMIN — FENTANYL CITRATE 50 MCG: 50 INJECTION, SOLUTION INTRAMUSCULAR; INTRAVENOUS at 11:19

## 2018-08-15 RX ADMIN — LIDOCAINE HYDROCHLORIDE 100 MG: 20 INJECTION, SOLUTION INFILTRATION; PERINEURAL at 11:08

## 2018-08-15 RX ADMIN — GLYCOPYRROLATE 0.2 MG: 0.2 INJECTION, SOLUTION INTRAMUSCULAR; INTRAVENOUS at 10:59

## 2018-08-15 RX ADMIN — FENTANYL CITRATE 25 MCG: 50 INJECTION, SOLUTION INTRAMUSCULAR; INTRAVENOUS at 12:47

## 2018-08-15 RX ADMIN — ONDANSETRON 4 MG: 2 INJECTION INTRAMUSCULAR; INTRAVENOUS at 11:50

## 2018-08-15 RX ADMIN — ROCURONIUM BROMIDE 70 MG: 10 INJECTION INTRAVENOUS at 11:08

## 2018-08-15 RX ADMIN — DEXAMETHASONE SODIUM PHOSPHATE 4 MG: 4 INJECTION, SOLUTION INTRA-ARTICULAR; INTRALESIONAL; INTRAMUSCULAR; INTRAVENOUS; SOFT TISSUE at 11:08

## 2018-08-15 RX ADMIN — PHENYLEPHRINE HYDROCHLORIDE 100 MCG: 10 INJECTION, SOLUTION INTRAMUSCULAR; INTRAVENOUS; SUBCUTANEOUS at 11:25

## 2018-08-15 RX ADMIN — SUGAMMADEX 320 MG: 100 INJECTION, SOLUTION INTRAVENOUS at 11:56

## 2018-08-15 ASSESSMENT — PAIN DESCRIPTION - DESCRIPTORS
DESCRIPTORS: DISCOMFORT
DESCRIPTORS: SORE

## 2018-08-15 NOTE — DISCHARGE INSTRUCTIONS
[facility]  Same-Day Surgery   Adult Discharge Orders & Instructions     For 24 hours after surgery    1. Get plenty of rest.  A responsible adult must stay with you for at least 24 hours after you leave the hospital.   2. Do not drive or use heavy equipment.  If you have weakness or tingling, don't drive or use heavy equipment until this feeling goes away.  3. Do not drink alcohol.  4. Avoid strenuous or risky activities.  Ask for help when climbing stairs.   5. You may feel lightheaded.  IF so, sit for a few minutes before standing.  Have someone help you get up.   6. If you have nausea (feel sick to your stomach): Drink only clear liquids such as apple juice, ginger ale, broth or 7-Up.  Rest may also help.  Be sure to drink enough fluids.  Move to a regular diet as you feel able.  7. You may have a slight fever. Call the doctor if your fever is over 100.5 F (38 C) (taken under the tongue) or lasts longer than 24 hours.  8. You may have a dry mouth, a sore throat, muscle aches or trouble sleeping.  These should go away after 24 hours.  9. Do not make important or legal decisions.   Call your doctor for any of the followin.  Signs of infection (fever, growing tenderness at the surgery site, a large amount of drainage or bleeding, severe pain, foul-smelling drainage, redness, swelling).    2. It has been over 8 to 10 hours since surgery and you are still not able to urinate (pass water).    3.  Headache for over 24 hours.      To contact a doctor, call Dr. Allyn Copeland @ [phone] (clinic) or [phone] (office) or:        [phone] and ask for the resident on call for Thoracic Surgery (answered 24 hours a day)      Emergency Department:    [facility]: [phone]       (TTY for hearing impaired: [phone])    Take it easy when you get home.  Remember, same day surgery DOES NOT MEAN SAME DAY RECOVERY!  Healing is a gradual process.  You will need some  time to recover - you may be more tired than you realize at first.  Rest and relax for at least the first 24 hours at home.  You'll feel better and heal faster if you take good care of yourself.

## 2018-08-15 NOTE — OR NURSING
Room now available on 3C for phase 2. Pt reports that his throat is more sore than on his admission today. Pt. Has taken some ice chips which cause him some burping.  Pt. Has declined additional pain medication and he has reported throat discomfort as tolerable.

## 2018-08-15 NOTE — BRIEF OP NOTE
[facility], [place]    Brief Operative Note    Pre-operative diagnosis: Esophageal Perforation   Post-operative diagnosis same  Procedure: Procedure(s):  esophagogastroduodenoscopy, stent removal - Wound Class: II-Clean Contaminated  Surgeon: Surgeon(s) and Role:     * Allyn Copeland MD - Primary     * Esau Emerson MD - Resident - Assisting  Anesthesia: General   Estimated blood loss: None  Drains: None  Specimens: * No specimens in log *  Findings:   no extravasation noted. No residual perforation noted. Stent removed..  Complications: None.  Implants: None.      - clear liquid diet  - DC from PACU  - follow up 2 weeks with CNS    Esau Emerson MD  PGY-3 General Surgery

## 2018-08-15 NOTE — IP AVS SNAPSHOT
MRN:[id number]                      After Visit Summary   8/15/2018    Tate Constantino    MRN: [id number]           Thank you!     Thank you for choosing [facility] for your care. Our goal is always to provide you with excellent care. Hearing back from our patients is one way we can continue to improve our services. Please take a few minutes to complete the written survey that you may receive in the mail after you visit with us. Thank you!        Patient Information     Date Of Birth          1980        About your hospital stay     You were admitted on:  August 15, 2018 You last received care in the:  Same Day Surgery [facility]    You were discharged on:  August 15, 2018       Who to Call     For medical emergencies, please call 911.  For non-urgent questions about your medical care, please call your primary care provider or clinic, [phone]  For questions related to your surgery, please call your surgery clinic        Attending Provider     Provider Specialty    Allyn Copeland MD Thoracic Diseases       Primary Care Provider Office Phone # Fax #    Ant Mo [phone]      After Care Instructions     Discharge Instructions       THORACIC SURGERY DISCHARGE INSTRUCTIONS    DIET: Clear liquids    If your plans upon discharge include prolonged periods of sitting (i.e a lengthy car or plane ride), it is highly beneficial to get up and walk at least once per hour to help prevent swelling and blood clots.     Take incentive spirometer home for continued frequent use    Activity as tolerated, no strenous activity until seen in follow-up.    Stay hydrated. Take over the counter fiber (metamucil or benefiber) and stool softeners (Miralax, docusate or senna) if becoming constipated.     Call for fever greater than 101.5, chills, increased size of incision, red skin around incision, vision changes, muscle strength changes, sensation changes, shortness of breath, or other  "concerns.    No driving while taking narcotic pain medication.    Transition to ibuprofen or tylenol/acetaminophen for pain control. Do not take tylenol/acetaminophen and acetaminophen containing narcotic (e.g., percocet or vicodin) at the same time. If you have known ulcer problems, or kidney trouble (elevated creatinine) do not take the ibuprofen.    In emergencies, call 911    For other Questions or Concerns;   A.) During weekday working hours (Monday through Friday 8am to 4:30pm)   call [phone] and ask to speak to a clinical nurse specialist.     B.) At nights (after 4:30pm), on weekends, or if urgent call [phone] and   tell the  \"I would like to page job code [phone], the thoracic surgery   fellow on call, please.\"            Discharge Instructions       Follow up with a thoracic surgery Clinical Nurse Specialist in Thoracic Surgery clinic in 2 weeks, prior to which a chest x-ray should be performed.                  Future tests that were ordered for you     XR Chest 2 Views                 Further instructions from your care team       [facility], [place]  Same-Day Surgery   Adult Discharge Orders & Instructions     For 24 hours after surgery    1. Get plenty of rest.  A responsible adult must stay with you for at least 24 hours after you leave the hospital.   2. Do not drive or use heavy equipment.  If you have weakness or tingling, don't drive or use heavy equipment until this feeling goes away.  3. Do not drink alcohol.  4. Avoid strenuous or risky activities.  Ask for help when climbing stairs.   5. You may feel lightheaded.  IF so, sit for a few minutes before standing.  Have someone help you get up.   6. If you have nausea (feel sick to your stomach): Drink only clear liquids such as apple juice, ginger ale, broth or 7-Up.  Rest may also help.  Be sure to drink enough fluids.  Move to a regular diet as you feel able.  7. You may have a slight fever. " "Call the doctor if your fever is over 100.5 F (38 C) (taken under the tongue) or lasts longer than 24 hours.  8. You may have a dry mouth, a sore throat, muscle aches or trouble sleeping.  These should go away after 24 hours.  9. Do not make important or legal decisions.   Call your doctor for any of the followin.  Signs of infection (fever, growing tenderness at the surgery site, a large amount of drainage or bleeding, severe pain, foul-smelling drainage, redness, swelling).    2. It has been over 8 to 10 hours since surgery and you are still not able to urinate (pass water).    3.  Headache for over 24 hours.      To contact a doctor, call Dr. Allyn Copeland @ [phone] (clinic) or [phone] (office) or:        [phone] and ask for the resident on call for Thoracic Surgery (answered 24 hours a day)      Emergency Department:    [facility]: [phone]       (TTY for hearing impaired: [phone])    Take it easy when you get home.  Remember, same day surgery DOES NOT MEAN SAME DAY RECOVERY!  Healing is a gradual process.  You will need some time to recover - you may be more tired than you realize at first.  Rest and relax for at least the first 24 hours at home.  You'll feel better and heal faster if you take good care of yourself.        Pending Results     No orders found from 2018 to 2018.            Admission Information     Date & Time Provider Department Dept. Phone    8/15/2018 Allyn Copeland MD Same Day Surgery [facility] [phone]      Your Vitals Were     Blood Pressure Temperature Respirations Height Weight Pulse Oximetry    114/74 98.2  F (36.8  C) (Oral) 20 1.676 m (5' 6\") 78 kg (171 lb 15.3 oz) 96%    BMI (Body Mass Index)                   27.75 kg/m2           My[employer] Information     [employer] lets you send messages to your doctor, view your test results, renew your prescriptions, schedule appointments and more. To sign up, go to [email or web address] " "Click on \"Log in\" on the left side of the screen, which will take you to the Welcome page. Then click on \"Sign up Now\" on the right side of the page.     You will be asked to enter the access code listed below, as well as some personal information. Please follow the directions to create your username and password.     Your access code is: [email or web address]  Expires: 10/30/2018  3:32 PM     Your access code will  in 90 days. If you need help or a new code, please call your [place] clinic or [phone].        Care EveryWhere ID     This is your Care EveryWhere ID. This could be used by other organizations to access your [place] medical records  [id number]        Equal Access to Services     TUTU GALLEGOS : Lucas Reich, washaron landeros, terence delvallealpineda gonzalez, nayeli gresham. So [facility] [phone].    ATENCIÓN: Si habla español, tiene a page disposición servicios gratuitos de asistencia lingüística. Llame al [phone].    We comply with applicable federal civil rights laws and [place] laws. We do not discriminate on the basis of race, color, national origin, age, disability, sex, sexual orientation, or gender identity.               Review of your medicines      CONTINUE these medicines which have NOT CHANGED        Dose / Directions    acetaminophen 32 mg/mL solution   Commonly known as:  TYLENOL   Used for:  Esophageal perforation        Dose:  650 mg   20.3 mLs (650 mg) by Per J Tube route every 4 hours   Quantity:  400 mL   Refills:  1       BRONKAID PO        Dose:  12.524 mg   Take 12.524 mg by mouth as needed (for phlegm)   Refills:  0       MULTIvitamin  S Caps        Dose:  1 capsule   Take 1 capsule by mouth daily   Refills:  0       ondansetron 4 MG ODT tab   Commonly known as:  ZOFRAN-ODT   Used for:  Esophageal perforation        Dose:  4 mg   Take 1 tablet (4 mg) by mouth every 6 hours as needed for nausea or vomiting   Quantity:  120 tablet "   Refills:  0       oxyCODONE 5 MG/5ML solution   Commonly known as:  ROXICODONE   Used for:  Esophageal perforation        Dose:  5-10 mg   5-10 mLs (5-10 mg) by Per J Tube route every 4 hours as needed for moderate to severe pain   Quantity:  350 mL   Refills:  0       pantoprazole 2 mg/mL Susp suspension   Commonly known as:  PROTONIX   Used for:  Esophageal perforation        Dose:  40 mg   20 mLs (40 mg) by Per Feeding Tube route daily   Quantity:  100 mL   Refills:  1       prochlorperazine 10 MG tablet   Commonly known as:  COMPAZINE   Used for:  Esophageal perforation        Dose:  10 mg   Take 1 tablet (10 mg) by mouth every 6 hours as needed for nausea or vomiting   Quantity:  90 tablet   Refills:  0       senna-docusate 8.6-50 MG per tablet   Commonly known as:  SENOKOT-S;PERICOLACE   Used for:  Bowel habit changes        Dose:  2 tablet   Take 2 tablets by mouth 2 times daily   Quantity:  120 tablet   Refills:  1                Protect others around you: Learn how to safely use, store and throw away your medicines at www.disposemymeds.org.             Medication List: This is a list of all your medications and when to take them. Check marks below indicate your daily home schedule. Keep this list as a reference.      Medications           Morning Afternoon Evening Bedtime As Needed    acetaminophen 32 mg/mL solution   Commonly known as:  TYLENOL   20.3 mLs (650 mg) by Per J Tube route every 4 hours   Last time this was given:  975 mg on 8/15/2018 12:57 PM                                BRONKAID PO   Take 12.524 mg by mouth as needed (for phlegm)                                MULTIvitamin  S Caps   Take 1 capsule by mouth daily                                ondansetron 4 MG ODT tab   Commonly known as:  ZOFRAN-ODT   Take 1 tablet (4 mg) by mouth every 6 hours as needed for nausea or vomiting                                oxyCODONE 5 MG/5ML solution   Commonly known as:  ROXICODONE   5-10 mLs (5-10 mg)  by Per J Tube route every 4 hours as needed for moderate to severe pain   Last time this was given:  5 mg on 8/15/2018  1:02 PM                                pantoprazole 2 mg/mL Susp suspension   Commonly known as:  PROTONIX   20 mLs (40 mg) by Per Feeding Tube route daily                                prochlorperazine 10 MG tablet   Commonly known as:  COMPAZINE   Take 1 tablet (10 mg) by mouth every 6 hours as needed for nausea or vomiting                                senna-docusate 8.6-50 MG per tablet   Commonly known as:  SENOKOT-S;PERICOLACE   Take 2 tablets by mouth 2 times daily

## 2018-08-15 NOTE — OR NURSING
Paged Dr Copeland for orders at 0840, call from Dr Copeland in OR to page Melissa Hilton, her pager is off, Dr Copeland states to page Kami Camargo, still awaiting orders.

## 2018-08-15 NOTE — ANESTHESIA CARE TRANSFER NOTE
Patient: Tate Constantino    Procedure(s):  esophagogastroduodenoscopy, stent removal - Wound Class: II-Clean Contaminated    Diagnosis: Esophageal Perforation   Diagnosis Additional Information: No value filed.    Anesthesia Type:   General, ETT     Note:  Airway :Face Mask  Patient transferred to:PACU  Comments: Airway :Face Mask  Patient transferred to:PACU  Comments: Prior to extubation, patient was breathing spontaneously with appropriate respiratory rate and tidal volume. The patient was following commands, warm and demonstrated adequate strength. Patient was suctioned and extubated without complication.   Transported to PACU on 6L O2 via face mask.   VSS upon arrival to PACU.  Patient denies nausea or pain at this time.   Care transfer plan communicated and patient care transferred to PACU ANDRE Pereyra  CA-1  [facility]   Handoff Report: Identifed the Patient, Identified the Reponsible Provider, Reviewed the pertinent medical history, Discussed the surgical course, Reviewed Intra-OP anesthesia mangement and issues during anesthesia, Set expectations for post-procedure period and Allowed opportunity for questions and acknowledgement of understanding      Vitals: (Last set prior to Anesthesia Care Transfer)    CRNA VITALS  8/15/2018 1130 - 8/15/2018 1211      8/15/2018             Pulse: 104    Ht Rate: 103    SpO2: 100 %    Resp Rate (observed): 16                Electronically Signed By: Jose Eduardo Pereyra MD  August 15, 2018  12:11 PM

## 2018-08-15 NOTE — IP AVS SNAPSHOT
Same Day Surgery [address] [address]    Phone:  [phone]                                       After Visit Summary   8/15/2018    Tate Constantino    MRN: [id number]           After Visit Summary Signature Page     I have received my discharge instructions, and my questions have been answered. I have discussed any challenges I see with this plan with the nurse or doctor.    ..........................................................................................................................................  Patient/Patient Representative Signature      ..........................................................................................................................................  Patient Representative Print Name and Relationship to Patient    ..................................................               ................................................  Date                                            Time    ..........................................................................................................................................  Reviewed by Signature/Title    ...................................................              ..............................................  Date                                                            Time

## 2018-08-15 NOTE — OR NURSING
Room on 3C not available at 1330.  Continue to wait for room availablilty.  Dr. Mayer here to see Pt at 1344.

## 2018-08-15 NOTE — ANESTHESIA POSTPROCEDURE EVALUATION
Patient: Tate Constantino    Procedure(s):  esophagogastroduodenoscopy, stent removal - Wound Class: II-Clean Contaminated    Diagnosis:Esophageal Perforation   Diagnosis Additional Information: No value filed.    Anesthesia Type:  General, ETT    Note:  Anesthesia Post Evaluation    Patient location during evaluation: PACU  Patient participation: Able to fully participate in evaluation  Level of consciousness: awake and alert  Pain management: adequate  Airway patency: patent  Cardiovascular status: acceptable  Respiratory status: acceptable  Hydration status: acceptable  PONV: none     Anesthetic complications: None          Last vitals:  Vitals:    08/15/18 1245 08/15/18 1300 08/15/18 1315   BP: 125/81     Resp: 22 20 20   Temp:      SpO2: 98%           Electronically Signed By: Jagdish Mayer MD  August 15, 2018  1:58 PM

## 2018-08-16 ENCOUNTER — HOME INFUSION (PRE-WILLOW HOME INFUSION) (OUTPATIENT)
Dept: PHARMACY | Facility: CLINIC | Age: 38
End: 2018-08-16

## 2018-08-17 NOTE — PROGRESS NOTES
This is a recent snapshot of the patient's [facility] medical record.  For current drug dose and complete information and questions, call [phone] or In Basket pool, [facility] home infusion ([id number])  CSN Number:  [id number]

## 2018-08-28 DIAGNOSIS — K22.3 ESOPHAGEAL PERFORATION: ICD-10-CM

## 2018-08-28 NOTE — TELEPHONE ENCOUNTER
I spoke with the pt's mother. She requested a refill of his PPI, which was sent to his local pharmacy.   They also picked up an OTC gas reliever/antacid (liquid form) in case they were unable to get the PPI.  Pt's mom states he is on a clear liquid diet--tolerating without complication.  He does tend to get bloated/belches after drinking.  He has an appt with Melissa MOORE next Tuesday. They will discuss on going questions with her at that time.  Camelia Tomas, CNP

## 2018-08-28 NOTE — OP NOTE
Procedure Date: 08/15/2018      PREOPERATIVE DIAGNOSIS:  Esophageal perforation, status post repair and stent insertion.      POSTOPERATIVE DIAGNOSIS:  Esophageal perforation, status post repair and stent insertion.      PROCEDURE PERFORMED:  Upper endoscopy and stent removal.      OPERATING SURGEON:  Marietta Rooney MD      ASSISTANT SURGEON:  Esau Emerson MD      ANESTHESIA:  General.      OPERATIVE FINDINGS:  On removal of the stent, no obvious perforation or leakage of contrast noted.      DESCRIPTION OF PROCEDURE:  After obtaining informed consent, the patient was brought to the operating room and laid supine on the operating table.  After induction of general anesthesia, and introduction of an endotracheal tube, an adult gastroscope was passed per orally.  The stent was visualized.  This was removed using a rat-tooth forceps and fluoroscopic guidance.  After removal of the stent, the esophagus and the stomach were thoroughly examined.  There were no obvious perforations that was noted.  After this, we injected contrast through the gastroscope with fluoroscopic visualization.  There was no leakage of contrast noted from the lumen of the esophagus.  At this point, we decided to leave the stent out.  The stomach was desufflated.  The patient tolerated the procedure well.         MARIETTA ROONEY MD             D: 2018   T: 2018   MT: ARIAS      Name:     CHRISTIANA BUSTAMANTE   MRN:      [id number]        Account:        [id number]   :      1980           Procedure Date: 08/15/2018      Document: [id number]

## 2018-09-04 ENCOUNTER — OFFICE VISIT (OUTPATIENT)
Dept: SURGERY | Facility: CLINIC | Age: 38
End: 2018-09-04
Attending: CLINICAL NURSE SPECIALIST
Payer: COMMERCIAL

## 2018-09-04 ENCOUNTER — RADIANT APPOINTMENT (OUTPATIENT)
Dept: GENERAL RADIOLOGY | Facility: CLINIC | Age: 38
End: 2018-09-04
Payer: COMMERCIAL

## 2018-09-04 VITALS
TEMPERATURE: 96.7 F | DIASTOLIC BLOOD PRESSURE: 72 MMHG | WEIGHT: 169.3 LBS | BODY MASS INDEX: 27.21 KG/M2 | HEART RATE: 73 BPM | OXYGEN SATURATION: 96 % | HEIGHT: 66 IN | SYSTOLIC BLOOD PRESSURE: 106 MMHG | RESPIRATION RATE: 18 BRPM

## 2018-09-04 DIAGNOSIS — K22.3 ESOPHAGEAL PERFORATION: ICD-10-CM

## 2018-09-04 DIAGNOSIS — K22.3 ESOPHAGEAL PERFORATION: Primary | ICD-10-CM

## 2018-09-04 PROCEDURE — G0463 HOSPITAL OUTPT CLINIC VISIT: HCPCS | Mod: ZF

## 2018-09-04 RX ORDER — CLINDAMYCIN PALMITATE HYDROCHLORIDE 75 MG/5ML
300 SOLUTION ORAL
COMMUNITY
Start: 2018-08-29 | End: 2018-09-08

## 2018-09-04 ASSESSMENT — PAIN SCALES - GENERAL: PAINLEVEL: MILD PAIN (3)

## 2018-09-04 NOTE — LETTER
9/4/2018       RE: Tate Constantino  [address] [place] [address]     Dear Colleague,    Thank you for referring your patient, Tate Constantino, to the [facility]. Please see a copy of my visit note below.    THORACIC SURGERY FOLLOW UP VISIT    Dear Dr. Mo,    I saw Mr. Tate Constantino in follow-up today. The clinical summary follows:     PREOP DIAGNOSIS   Esophageal perforation  PROCEDURE   Upper endoscopy and stent removal  DATE OF PROCEDURE  08/15/2018    COMPLICATIONS  None    INTERVAL STUDIES  CXR-final read pending    ETOH None  TOB Never Smoker  BMI 27.8    SUBJECTIVE  Tate reports a lot of gas pain and burping. This occurs after he swallows anything and if he bends over. He states he does cough every time he drinks something.    From a personal perspective, he was recently in the Emergency Department at his local hospital where he was found to have aspiration pneumonia. He was started on antibiotics.    IMPRESSION (K22.3) Esophageal perforation  (primary encounter diagnosis)  Comment: Not cleared to advance diet at this time  Plan: Speech therapy consult with video swallow study    34 year-old male with an esophageal perforation status post upper endoscopy and stent removal.    PLAN  I spent a total of 25 minutes with Mr. Tate Constantino, more than 50% of which were spent in counseling, coordination of care, and face-to-face time. I reviewed the plan as follows:  Given that he is aspirating every time he drinks something, we will not advance his diet at this time. I would like him to see speech therapy with a video swallow study.  Necessary Tests & Appointments: Speech therapy with video swallow study  Pain Control Plan: N/A  Anticoagulation Plan: N/A  Smoking Cessation: N/A  All questions were answered and the patient and present family were in agreement with the plan.  I appreciate the opportunity to participate in the care of your patient and will keep you  updated.  Sincerely,        Melissa Hilton, APRN CNS

## 2018-09-04 NOTE — NURSING NOTE
"Oncology Rooming Note    September 4, 2018 9:17 AM   Tate Constantino is a 37 year old male who presents for:    Chief Complaint   Patient presents with     Oncology Clinic Visit     Return visit related to Esophageal Perforation     Initial Vitals: /72 (BP Location: Left arm, Patient Position: Sitting, Cuff Size: Adult Regular)  Pulse 73  Temp 96.7  F (35.9  C) (Tympanic)  Resp 18  Ht 1.676 m (5' 5.98\")  Wt 76.8 kg (169 lb 4.8 oz)  SpO2 96%  BMI 27.34 kg/m2 Estimated body mass index is 27.34 kg/(m^2) as calculated from the following:    Height as of this encounter: 1.676 m (5' 5.98\").    Weight as of this encounter: 76.8 kg (169 lb 4.8 oz). Body surface area is 1.89 meters squared.  Mild Pain (3) Comment: Data Unavailable   No LMP for male patient.  Allergies reviewed: Yes  Medications reviewed: Yes    Medications: Medication refills not needed today.  Pharmacy name entered into [facility]: [facility] [address] [place] [address] - [address]    Clinical concerns: No new concerns. Provider was notified.    10 minutes for nursing intake (face to face time)     Marlene Raya LPN            "

## 2018-09-04 NOTE — MR AVS SNAPSHOT
After Visit Summary   9/4/2018    Tate Constantino    MRN: [id number]           Patient Information     Date Of Birth          1980        Visit Information        Provider Department      9/4/2018 10:40 AM Melissa Hilton APRN CNS [facility]        Today's Diagnoses     Esophageal perforation    -  1       Follow-ups after your visit        Additional Services     Speech Therapy Referral       *This order will print in the [facility] Central Scheduling Office*    [facility] provides Speech Therapy evaluation and treatment and many specialty services across the [facility] system.  If requesting a specialty program, please choose from the list below.    Call [phone] to schedule [facility] at all locations, with the exception of [facility], please call [phone].     Treatment: Evaluation & Treatment  Speech Treatment Diagnosis: Dysphagia  Special Instructions: trying to advance diet-recently in hospital for aspiration pneumonia  Special Programs: Video Swallow Study    Please be aware that coverage of these services is subject to the terms and limitations of your health insurance plan.  Call member services at your health plan with any benefit or coverage questions.      **Note to Provider** To refer patients to therapy outside of the location list, change the order class to External Referral in the order composer.                  Your next 10 appointments already scheduled     Sep 04, 2018 10:40 AM CDT   (Arrive by 10:25 AM)   Return Visit with STEPHEN James   [facility] ([facility])    [address] [place] [address]  [address]  [facility] [id number] [phone]            Sep 18, 2018  8:00 AM CDT   XR VIDEO SPEECH EVALUATION WITH ESOPHAGRAM with UCXR2, UC GIGU RAD   [facility] ([facility]  [place])    [address]  1st Floor  [facility] [address] [phone]           Please bring a list of your current medicines to your exam. (Include vitamins, minerals and over-the-counter medicines.) Leave your valuables at home.  Tell the doctor if there is a chance you could be pregnant.  Do not eat for 4 hours before the exam. Keep drinking clear liquids until 2 hours before the exam.  You may take pain medicine (with a sip of water) up to 4 hours before the exam.  Do not swallow any other medicines unless your doctor tells you to. Talk to your doctor to be sure it s safe to stop your medicines.  Please call the Imaging Department at your exam site with any questions.            Sep 18, 2018  8:00 AM CDT   Video Swallow with DAVID Fletcher   [facility] ([facility])    [address] [place] [address]  [address] [facility] [address] [phone]           Please check in for this visit in Imaging on the 1st floor.              Future tests that were ordered for you today     Open Future Orders        Priority Expected Expires Ordered    XR Video Swallow w Esophagram - Order with Speech Therapy Referral Routine 9/4/2018 9/4/2019 9/4/2018            Who to contact     If you have questions or need follow up information about today's clinic visit or your schedule please contact [facility] directly at [phone].  Normal or non-critical lab and imaging results will be communicated to you by MyChart, letter or phone within 4 business days after the clinic has received the results. If you do not hear from us within 7 days, please contact the clinic through MyChart or phone. If you have a critical or abnormal lab result, we will notify you by phone as soon as possible.  Submit refill requests through [employer] or call your pharmacy and they will forward the refill request to us. Please allow 3 business days for your refill to be completed.           "Additional Information About Your Visit        MyChart Information     [employer] gives you secure access to your electronic health record. If you see a primary care provider, you can also send messages to your care team and make appointments. If you have questions, please call your primary care clinic.  If you do not have a primary care provider, please call [phone] and they will assist you.        Care EveryWhere ID     This is your Care EveryWhere ID. This could be used by other organizations to access your [place] medical records  [id number]        Your Vitals Were     Pulse Temperature Respirations Height Pulse Oximetry BMI (Body Mass Index)    73 96.7  F (35.9  C) (Tympanic) 18 1.676 m (5' 5.98\") 96% 27.34 kg/m2       Blood Pressure from Last 3 Encounters:   09/04/18 106/72   08/15/18 117/69   08/02/18 139/69    Weight from Last 3 Encounters:   09/04/18 76.8 kg (169 lb 4.8 oz)   08/15/18 78 kg (171 lb 15.3 oz)   07/31/18 79.4 kg (175 lb)              We Performed the Following     Speech Therapy Referral        Primary Care Provider Office Phone # Fax #    Ant Mo [phone]       [facility] [address]  [facility] [address]        Equal Access to Services     [facility] : Hadii aad ku hadasho Soomaali, waaxda luqadaha, qaybta kaalmada adeegyada, nayeli tobias . So [facility] [phone].    ATENCIÓN: Si habla español, tiene a page disposición servicios gratuitos de asistencia lingüística. Llame al [phone].    We comply with applicable federal civil rights laws and [place] laws. We do not discriminate on the basis of race, color, national origin, age, disability, sex, sexual orientation, or gender identity.            Thank you!     Thank you for choosing [facility]  for your care. Our goal is always to provide you with excellent care. Hearing back from our patients is one way we can continue to improve our " services. Please take a few minutes to complete the written survey that you may receive in the mail after your visit with us. Thank you!             Your Updated Medication List - Protect others around you: Learn how to safely use, store and throw away your medicines at www.disposemymeds.org.          This list is accurate as of 9/4/18 10:26 AM.  Always use your most recent med list.                   Brand Name Dispense Instructions for use Diagnosis    acetaminophen 32 mg/mL solution    TYLENOL    400 mL    20.3 mLs (650 mg) by Per J Tube route every 4 hours    Esophageal perforation       BRONKAID PO      Take 12.524 mg by mouth as needed (for phlegm)        clindamycin 75 MG/5ML solution    CLEOCIN     Take 300 mg by mouth        MULTIvitamin  S Caps      Take 1 capsule by mouth daily        ondansetron 4 MG ODT tab    ZOFRAN-ODT    120 tablet    Take 1 tablet (4 mg) by mouth every 6 hours as needed for nausea or vomiting    Esophageal perforation       oxyCODONE 5 MG/5ML solution    ROXICODONE    350 mL    5-10 mLs (5-10 mg) by Per J Tube route every 4 hours as needed for moderate to severe pain    Esophageal perforation       pantoprazole 2 mg/mL Susp suspension    PROTONIX    100 mL    20 mLs (40 mg) by Per Feeding Tube route daily    Esophageal perforation       prochlorperazine 10 MG tablet    COMPAZINE    90 tablet    Take 1 tablet (10 mg) by mouth every 6 hours as needed for nausea or vomiting    Esophageal perforation       senna-docusate 8.6-50 MG per tablet    SENOKOT-S;PERICOLACE    120 tablet    Take 2 tablets by mouth 2 times daily    Bowel habit changes

## 2018-09-04 NOTE — Clinical Note
Hey,  He needs a speech path eval with video swallow study as soon as possible please. He is in room 28 waiting to schedule.  Thanks H

## 2018-09-04 NOTE — PROGRESS NOTES
THORACIC SURGERY FOLLOW UP VISIT    Dear Dr. Mo,    I saw Mr. Tate Constantino in follow-up today. The clinical summary follows:     PREOP DIAGNOSIS   Esophageal perforation  PROCEDURE   Upper endoscopy and stent removal  DATE OF PROCEDURE  08/15/2018    COMPLICATIONS  None    INTERVAL STUDIES  CXR-final read pending    ETOH None  TOB Never Smoker  BMI 27.8    SUBJECTIVE  Tate reports a lot of gas pain and burping. This occurs after he swallows anything and if he bends over. He states he does cough every time he drinks something.    From a personal perspective, he was recently in the Emergency Department at his local hospital where he was found to have aspiration pneumonia. He was started on antibiotics.    IMPRESSION (K22.3) Esophageal perforation  (primary encounter diagnosis)  Comment: Not cleared to advance diet at this time  Plan: Speech therapy consult with video swallow study    34 year-old male with an esophageal perforation status post upper endoscopy and stent removal.    PLAN  I spent a total of 25 minutes with Mr. Tate Constantino, more than 50% of which were spent in counseling, coordination of care, and face-to-face time. I reviewed the plan as follows:  Given that he is aspirating every time he drinks something, we will not advance his diet at this time. I would like him to see speech therapy with a video swallow study.  Necessary Tests & Appointments: Speech therapy with video swallow study  Pain Control Plan: N/A  Anticoagulation Plan: N/A  Smoking Cessation: N/A  All questions were answered and the patient and present family were in agreement with the plan.  I appreciate the opportunity to participate in the care of your patient and will keep you updated.  Sincerely,

## 2018-09-07 ENCOUNTER — HOME INFUSION (PRE-WILLOW HOME INFUSION) (OUTPATIENT)
Dept: PHARMACY | Facility: CLINIC | Age: 38
End: 2018-09-07

## 2018-09-10 NOTE — PROGRESS NOTES
This is a recent snapshot of the patient's [facility] medical record.  For current drug dose and complete information and questions, call [phone] or In Basket pool, [facility] home infusion ([id number])  CSN Number:  [id number]

## 2018-09-17 ENCOUNTER — HOME INFUSION (PRE-WILLOW HOME INFUSION) (OUTPATIENT)
Dept: PHARMACY | Facility: CLINIC | Age: 38
End: 2018-09-17

## 2018-09-18 ENCOUNTER — OFFICE VISIT (OUTPATIENT)
Dept: SURGERY | Facility: CLINIC | Age: 38
End: 2018-09-18
Attending: CLINICAL NURSE SPECIALIST
Payer: COMMERCIAL

## 2018-09-18 ENCOUNTER — THERAPY VISIT (OUTPATIENT)
Dept: SPEECH THERAPY | Facility: CLINIC | Age: 38
End: 2018-09-18
Attending: CLINICAL NURSE SPECIALIST
Payer: COMMERCIAL

## 2018-09-18 ENCOUNTER — RADIANT APPOINTMENT (OUTPATIENT)
Dept: GENERAL RADIOLOGY | Facility: CLINIC | Age: 38
End: 2018-09-18
Attending: CLINICAL NURSE SPECIALIST
Payer: COMMERCIAL

## 2018-09-18 VITALS
TEMPERATURE: 98.4 F | WEIGHT: 163 LBS | HEIGHT: 65 IN | SYSTOLIC BLOOD PRESSURE: 129 MMHG | HEART RATE: 65 BPM | OXYGEN SATURATION: 99 % | DIASTOLIC BLOOD PRESSURE: 84 MMHG | BODY MASS INDEX: 27.16 KG/M2 | RESPIRATION RATE: 16 BRPM

## 2018-09-18 DIAGNOSIS — R13.14 PHARYNGOESOPHAGEAL DYSPHAGIA: Primary | ICD-10-CM

## 2018-09-18 DIAGNOSIS — K22.3 ESOPHAGEAL PERFORATION: ICD-10-CM

## 2018-09-18 DIAGNOSIS — K22.3 ESOPHAGEAL PERFORATION: Primary | ICD-10-CM

## 2018-09-18 PROCEDURE — G0463 HOSPITAL OUTPT CLINIC VISIT: HCPCS | Mod: ZF

## 2018-09-18 RX ORDER — BARIUM SULFATE 400 MG/ML
SUSPENSION ORAL ONCE
Status: COMPLETED | OUTPATIENT
Start: 2018-09-18 | End: 2018-09-18

## 2018-09-18 RX ORDER — BUDESONIDE AND FORMOTEROL FUMARATE DIHYDRATE 160; 4.5 UG/1; UG/1
2 AEROSOL RESPIRATORY (INHALATION)
COMMUNITY
Start: 2018-09-10 | End: 2019-04-04

## 2018-09-18 RX ADMIN — BARIUM SULFATE: 400 SUSPENSION ORAL at 08:29

## 2018-09-18 ASSESSMENT — PAIN SCALES - GENERAL: PAINLEVEL: MODERATE PAIN (4)

## 2018-09-18 NOTE — PROGRESS NOTES
THORACIC SURGERY FOLLOW UP VISIT    Dear Dr. Wells,  I saw Mr. Tate Constantino in follow-up today. The clinical summary follows:     PREOP DIAGNOSIS   Esophageal perforation  PROCEDURE   1.  Esophagogastroduodenoscopy.   2.  Percutaneous endoscopic gastrostomy tube placement.   3.  Gastrojejunostomy feeding tube placement.   4.  Fluoroscopy with interpretation of images.   5.  Right tube thoracostomy.   6.  Left thoracotomy with primary repair of esophageal perforation and intercostal muscle flap reinforcement.   7.  Esophageal stent placement (16 x 120 mm, fully covered).   8.  Flexible bronchoscopyUpper endoscopy and stent removal  Removal of esophageal stent (8/15/18)  DATE OF PROCEDURE  07/21/18    COMPLICATIONS  None    INTERVAL STUDIES  Video swallow study/esophagram with speech path. Esophagram does not show any aspiration. This exam is normal. Still awaiting final report from speech therapy.    ETOH None  TOB Never Smoker  BMI 27.1    SUBJECTIVE  Tate is doing well. Not coughing every time he drinks anything. He does still cough-feels like he has a lot of secretions built up and needs to cough. Saw his primary care for follow up from his aspiration pneumonia and was given a prescription for Symbicort. Has follow up with his primary on October 1 to re-evaluate his cough.    Says speech therapist told him increasing to full liquids would be fine based on his video swallow study.    He does still have rib tenderness when he bends over.    From a personal perspective, he is here alone and is eager to advance his diet.    IMPRESSION (K22.3) Esophageal perforation  (primary encounter diagnosis)  Comment: Advance diet to fulls  Plan: I will call him in 1 week for an update.    38 year-old male with history of esophageal perforation.    PLAN  I spent a total of 25 minutes with Mr. Tate Constantino, more than 50% of which were spent in counseling, coordination of care, and face-to-face time. I reviewed the plan as  follows:  Continue with inhaler prescribed to him by primary.  Advance diet to fulls. No change to tube feeding amount. Given handout on full liquid diet.  Ribs will continue to improve over time. Not uncommon for him to still be tender after the procedures he has had.  Necessary Tests & Appointments: will call for an update in 1 week  Pain Control Plan: N/A  Anticoagulation Plan: N/A  Smoking Cessation: N/A  All questions were answered and the patient and present family were in agreement with the plan.  I appreciate the opportunity to participate in the care of your patient and will keep you updated.  Sincerely,

## 2018-09-18 NOTE — MR AVS SNAPSHOT
After Visit Summary   9/18/2018    Tate Constantino    MRN: [id number]           Patient Information     Date Of Birth          1980        Visit Information        Provider Department      9/18/2018 8:00 AM Sangeetha Quintero SLP [facility]        Today's Diagnoses     Pharyngoesophageal dysphagia    -  1       Follow-ups after your visit        Who to contact     Please call your clinic at [phone] to:    Ask questions about your health    Make or cancel appointments    Discuss your medicines    Learn about your test results    Speak to your doctor            Additional Information About Your Visit        MyChart Information     [employer] gives you secure access to your electronic health record. If you see a primary care provider, you can also send messages to your care team and make appointments. If you have questions, please call your primary care clinic.  If you do not have a primary care provider, please call [phone] and they will assist you.      [employer] is an electronic gateway that provides easy, online access to your medical records. With [employer], you can request a clinic appointment, read your test results, renew a prescription or communicate with your care team.     To access your existing account, please contact your [facility] or call [phone] for assistance.        Care EveryWhere ID     This is your Care EveryWhere ID. This could be used by other organizations to access your [facility] medical records  [id number]         Blood Pressure from Last 3 Encounters:   09/18/18 129/84   09/04/18 106/72   08/15/18 117/69    Weight from Last 3 Encounters:   09/18/18 73.9 kg (163 lb)   09/04/18 76.8 kg (169 lb 4.8 oz)   08/15/18 78 kg (171 lb 15.3 oz)              Today, you had the following     No orders found for display       Primary Care Provider Office Phone # Fax #    Ant Mo [phone]       [facility]  [address]  [place] [address]        Equal Access to Services     [facility]: Hadii aad ku hadcurto Solillyali, waaxda luqadaha, qaybta kaalmada adedemarcusyada, nayeli humphreyin hayaarichi cejademarcus cohen laTrevoralem . So [facility] [phone].    ATENCIÓN: Si habla español, tiene a page disposición servicios gratuitos de asistencia lingüística. Llame al [phone].    We comply with applicable federal civil rights laws and [place] laws. We do not discriminate on the basis of race, color, national origin, age, disability, sex, sexual orientation, or gender identity.            Thank you!     Thank you for choosing [facility]  for your care. Our goal is always to provide you with excellent care. Hearing back from our patients is one way we can continue to improve our services. Please take a few minutes to complete the written survey that you may receive in the mail after your visit with us. Thank you!             Your Updated Medication List - Protect others around you: Learn how to safely use, store and throw away your medicines at www.disposemymeds.org.          This list is accurate as of 9/18/18 11:59 PM.  Always use your most recent med list.                   Brand Name Dispense Instructions for use Diagnosis    acetaminophen 32 mg/mL solution    TYLENOL    400 mL    20.3 mLs (650 mg) by Per J Tube route every 4 hours    Esophageal perforation       BRONKAID PO      Take 12.524 mg by mouth as needed (for phlegm)        GuaiFENesin 200 MG/5ML Liqd      200 mg by Nasogastric route        MULTIvitamin  S Caps      Take 1 capsule by mouth daily        ondansetron 4 MG ODT tab    ZOFRAN-ODT    120 tablet    Take 1 tablet (4 mg) by mouth every 6 hours as needed for nausea or vomiting    Esophageal perforation       oxyCODONE 5 MG/5ML solution    ROXICODONE    350 mL    5-10 mLs (5-10 mg) by Per J Tube route every 4 hours as needed for moderate to severe pain    Esophageal perforation       prochlorperazine 10 MG tablet     COMPAZINE    90 tablet    Take 1 tablet (10 mg) by mouth every 6 hours as needed for nausea or vomiting    Esophageal perforation       senna-docusate 8.6-50 MG per tablet    SENOKOT-S;PERICOLACE    120 tablet    Take 2 tablets by mouth 2 times daily    Bowel habit changes       SYMBICORT 160-4.5 MCG/ACT Inhaler   Generic drug:  budesonide-formoterol      Inhale 2 puffs into the lungs

## 2018-09-18 NOTE — NURSING NOTE
"Oncology Rooming Note    September 18, 2018 9:51 AM   Tate Constantino is a 38 year old male who presents for:    Chief Complaint   Patient presents with     Oncology Clinic Visit     Esophageal perforation     Initial Vitals: /84  Pulse 65  Temp 98.4  F (36.9  C) (Oral)  Resp 16  Ht 1.651 m (5' 5\")  Wt 73.9 kg (163 lb)  SpO2 99%  BMI 27.12 kg/m2 Estimated body mass index is 27.12 kg/(m^2) as calculated from the following:    Height as of this encounter: 1.651 m (5' 5\").    Weight as of this encounter: 73.9 kg (163 lb). Body surface area is 1.84 meters squared.  Moderate Pain (4) Comment: Ribs   No LMP for male patient.  Allergies reviewed: Yes  Medications reviewed: Yes    Medications: Medication refills not needed today.  Pharmacy name entered into [employer]: [facility] [address] [place] [address], [place] - [address]    Clinical concerns: No New Concerns    5 minutes for nursing intake (face to face time)     DARLENE Carmona      "

## 2018-09-18 NOTE — PROGRESS NOTES
This is a recent snapshot of the patient's [facility] medical record.  For current drug dose and complete information and questions, call [phone] or In Basket pool, [facility]v home infusion ([id number])  CSN Number:  [id number]

## 2018-09-18 NOTE — MR AVS SNAPSHOT
"              After Visit Summary   9/18/2018    Tate Constantino    MRN: [id number]           Patient Information     Date Of Birth          1980        Visit Information        Provider Department      9/18/2018 10:00 AM Melissa Hilton APRN CNS [facility]        Today's Diagnoses     Esophageal perforation    -  1       Follow-ups after your visit        Who to contact     If you have questions or need follow up information about today's clinic visit or your schedule please contact [facility] directly at [phone].  Normal or non-critical lab and imaging results will be communicated to you by [employer], letter or phone within 4 business days after the clinic has received the results. If you do not hear from us within 7 days, please contact the clinic through [employer] or phone. If you have a critical or abnormal lab result, we will notify you by phone as soon as possible.  Submit refill requests through [employer] or call your pharmacy and they will forward the refill request to us. Please allow 3 business days for your refill to be completed.          Additional Information About Your Visit        MyChart Information     [employer] gives you secure access to your electronic health record. If you see a primary care provider, you can also send messages to your care team and make appointments. If you have questions, please call your primary care clinic.  If you do not have a primary care provider, please call [phone] and they will assist you.        Care EveryWhere ID     This is your Care EveryWhere ID. This could be used by other organizations to access your [place] medical records  [id number]        Your Vitals Were     Pulse Temperature Respirations Height Pulse Oximetry BMI (Body Mass Index)    65 98.4  F (36.9  C) (Oral) 16 1.651 m (5' 5\") 99% 27.12 kg/m2       Blood Pressure from Last 3 Encounters:   09/18/18 129/84   09/04/18 106/72   08/15/18 117/69    " Weight from Last 3 Encounters:   09/18/18 73.9 kg (163 lb)   09/04/18 76.8 kg (169 lb 4.8 oz)   08/15/18 78 kg (171 lb 15.3 oz)              Today, you had the following     No orders found for display       Primary Care Provider Office Phone # Fax #    Ant Mo [phone]       [facility] [place] [facility] [address]  [facility] [address]        Equal Access to Services     [employer] [facility] : Hadii aad ku hadasho Soomaali, waaxda luqadaha, qaybta kaalmada adeegyada, waxay idiin hayaan adeeg kharash lacherrie . So [facility] [phone].    ATENCIÓN: Si habla español, tiene a page disposición servicios gratuitos de asistencia lingüística. Llame al [phone].    We comply with applicable federal civil rights laws and [place] laws. We do not discriminate on the basis of race, color, national origin, age, disability, sex, sexual orientation, or gender identity.            Thank you!     Thank you for choosing [facility]  for your care. Our goal is always to provide you with excellent care. Hearing back from our patients is one way we can continue to improve our services. Please take a few minutes to complete the written survey that you may receive in the mail after your visit with us. Thank you!             Your Updated Medication List - Protect others around you: Learn how to safely use, store and throw away your medicines at www.disposemymeds.org.          This list is accurate as of 9/18/18 10:29 AM.  Always use your most recent med list.                   Brand Name Dispense Instructions for use Diagnosis    acetaminophen 32 mg/mL solution    TYLENOL    400 mL    20.3 mLs (650 mg) by Per J Tube route every 4 hours    Esophageal perforation       BRONKAID PO      Take 12.524 mg by mouth as needed (for phlegm)        GuaiFENesin 200 MG/5ML Liqd      200 mg by Nasogastric route        MULTIvitamin  S Caps      Take 1 capsule by mouth daily        ondansetron 4 MG ODT tab     ZOFRAN-ODT    120 tablet    Take 1 tablet (4 mg) by mouth every 6 hours as needed for nausea or vomiting    Esophageal perforation       oxyCODONE 5 MG/5ML solution    ROXICODONE    350 mL    5-10 mLs (5-10 mg) by Per J Tube route every 4 hours as needed for moderate to severe pain    Esophageal perforation       pantoprazole 2 mg/mL Susp suspension    PROTONIX    100 mL    20 mLs (40 mg) by Per Feeding Tube route daily    Esophageal perforation       prochlorperazine 10 MG tablet    COMPAZINE    90 tablet    Take 1 tablet (10 mg) by mouth every 6 hours as needed for nausea or vomiting    Esophageal perforation       senna-docusate 8.6-50 MG per tablet    SENOKOT-S;PERICOLACE    120 tablet    Take 2 tablets by mouth 2 times daily    Bowel habit changes       SYMBICORT 160-4.5 MCG/ACT Inhaler   Generic drug:  budesonide-formoterol      Inhale 2 puffs into the lungs

## 2018-09-18 NOTE — LETTER
9/18/2018       RE: Tate Constantino  [address] [place] [address]     Dear Colleague,    Thank you for referring your patient, Tate Constantino, to the [facility]. Please see a copy of my visit note below.    THORACIC SURGERY FOLLOW UP VISIT    Dear Dr. Wells,  I saw Mr. Tate Constantino in follow-up today. The clinical summary follows:     PREOP DIAGNOSIS   Esophageal perforation  PROCEDURE   1.  Esophagogastroduodenoscopy.   2.  Percutaneous endoscopic gastrostomy tube placement.   3.  Gastrojejunostomy feeding tube placement.   4.  Fluoroscopy with interpretation of images.   5.  Right tube thoracostomy.   6.  Left thoracotomy with primary repair of esophageal perforation and intercostal muscle flap reinforcement.   7.  Esophageal stent placement (16 x 120 mm, fully covered).   8.  Flexible bronchoscopyUpper endoscopy and stent removal  Removal of esophageal stent (8/15/18)  DATE OF PROCEDURE  07/21/18    COMPLICATIONS  None    INTERVAL STUDIES  Video swallow study/esophagram with speech path. Esophagram does not show any aspiration. This exam is normal. Still awaiting final report from speech therapy.    ETOH None  TOB Never Smoker  BMI 27.1    SUBJECTIVE  Tate is doing well. Not coughing every time he drinks anything. He does still cough-feels like he has a lot of secretions built up and needs to cough. Saw his primary care for follow up from his aspiration pneumonia and was given a prescription for Symbicort. Has follow up with his primary on October 1 to re-evaluate his cough.    Says speech therapist told him increasing to full liquids would be fine based on his video swallow study.    He does still have rib tenderness when he bends over.    From a personal perspective, he is here alone and is eager to advance his diet.    IMPRESSION (K22.3) Esophageal perforation  (primary encounter diagnosis)  Comment: Advance diet to fulls  Plan: I will call him in 1 week for an  update.    38 year-old male with history of esophageal perforation.    PLAN  I spent a total of 25 minutes with Mr. Tate Constantino, more than 50% of which were spent in counseling, coordination of care, and face-to-face time. I reviewed the plan as follows:  Continue with inhaler prescribed to him by primary.  Advance diet to fulls. No change to tube feeding amount. Given handout on full liquid diet.  Ribs will continue to improve over time. Not uncommon for him to still be tender after the procedures he has had.  Necessary Tests & Appointments: will call for an update in 1 week  Pain Control Plan: N/A  Anticoagulation Plan: N/A  Smoking Cessation: N/A  All questions were answered and the patient and present family were in agreement with the plan.  I appreciate the opportunity to participate in the care of your patient and will keep you updated.  Sincerely,        STEPHEN James CNS

## 2018-09-25 ENCOUNTER — TELEPHONE (OUTPATIENT)
Dept: SURGERY | Facility: CLINIC | Age: 38
End: 2018-09-25

## 2018-09-25 NOTE — TELEPHONE ENCOUNTER
"Call to Tate to see how things have been going with full liquids. His cough is much improved and does not occur with eating/drinking. His weight is 169.6 which is up from last week. He is using 7 cans of tube feedings per day and feels \"pretty full.\" I advised him to decrease this to 5 cans per day and continue to take more intake by mouth. He can have a soft food diet-things the consistency of scramble eggs. He will continue to monitor his weight. I will call again in 1 week to see how things are coming along.     He may return to work for up to 4 hours a day. Still no lifting greater than 15# and no handling of pallet jacks.    Tate agrees with this plan and does not have any questions or concerns at this time.  "

## 2018-10-02 ENCOUNTER — TELEPHONE (OUTPATIENT)
Dept: SURGERY | Facility: CLINIC | Age: 38
End: 2018-10-02

## 2018-10-02 DIAGNOSIS — K22.3 ESOPHAGEAL PERFORATION: ICD-10-CM

## 2018-10-02 NOTE — TELEPHONE ENCOUNTER
Call to Tate to see how things have been going since we decreased him to 5 cans of tube feeding. He is doing well-taking food orally without any coughing or dysphagia.He lost 1 pound but has also been increasing his activity over the last week.     He needs a refill on the Protonix. He also needs an updated letter for him to give to his employer. We will increase the amount of time he can be at work each day to 6 hours.    I instructed Tate to decrease the tube feedings to 4 cans. If things go well, when I call him next week we can consider going to 2 cans a day.    Tate is in agreement with this and has no questions or concerns.

## 2018-10-10 ENCOUNTER — TELEPHONE (OUTPATIENT)
Dept: SURGERY | Facility: CLINIC | Age: 38
End: 2018-10-10

## 2018-10-10 NOTE — TELEPHONE ENCOUNTER
Call to Tate to see how his eating is going. He is eating well and not coughing when eating or drinking. He does still have an occasional cough but not with oral intake.    His weight goes between 167-169. I will reduce him to 2 cans of tube feedings per day and he will work on increasing his oral intake. He has been walking around at work a lot and would like to increase to 9 hours per day.    I will draft a new work letter and will call him next week.    Tate is in agreement with this plan.

## 2018-10-18 ENCOUNTER — TELEPHONE (OUTPATIENT)
Dept: SURGERY | Facility: CLINIC | Age: 38
End: 2018-10-18

## 2018-10-18 NOTE — TELEPHONE ENCOUNTER
Call to Tate to see how his eating has been going. He is eating well-no coughing episodes when eating or drinking. He does still have an intermittent cough but not when eating or drinking. He is taking pantoprazole. I advised him to follow up with his local provider for ongoing evaluation of this cough.    He is maintaining his weight and feels ready to stop the tube feedings. We will stop the tube feedings and if he is able to maintain his weight and nutrition orally and not need to use the tube for a month, the tube can come out at that point, I instructed him to continue to flush the tube daily as instructed. He will ask his local provider if he can remove the feeding tube in a month rather than come here which is an all day event with to and from commute.     He would like to increase his work hour to 12 hours per day which he says are his normal work hours. I will send an updated work letter to him via My Chart.    I will touch base with Tate in a couple of weeks to see how things are going. Tate is in agreement with this plan.

## 2018-10-24 NOTE — PROGRESS NOTES
09/18/18 0800   General Information   Type Of Visit Initial   Start Of Care Date 09/18/18   Referring Physician STEPHEN Cotton CNS   Orders Evaluate And Treat   Orders Comment Trying to advance diet-recently in hospital for aspiration pneumonia   Medical Diagnosis Esophageal perforation   Onset Of Illness/injury Or Date Of Surgery 07/20/18   Precautions/limitations Swallowing Precautions   Hearing Adequate for conversation   Pertinent History of Current Problem/OT: Additional Occupational Profile Info Tate Constantino is a 38-year-old male with a PMH significant for esophageal perforation on 7/20/18 s/p left thoracotomy with primary esophageal repair and intercostal muscle flap reinforcement, esophageal stent, bilateral chest tubes, and PEG-J placement. Stent was removed on 8/15/18. Per chart, pt completed a VFSS on 7/30/18 and a subsequent clinical swallow evaluation on 8/2/18 which both demonstrated functional oral and pharyngeal phases of swallowing. Pt exhibited intermittent flash penetration of thin liquids during VFSS. He was started on a clear liquid diet and recommended to advance diet as tolerated per thoracic team. Near the end of 8/2018, pt was found to have a left lower lobe pneumonia concerning for aspiration pneumonia. Pt was referred for a repeat VFSS due to concern for aspiration given recent pneumonia. Upon clinical interview today, pt reported he continues to drink a clear liquid diet at this time. Reported he continues to have frequent coughing and burping with and without PO intake; endorsed burping seems to increase when swallowing liquids but coughing does not seem to increase with liquid intake. Stated he questions if coughing is related to his saliva building up over time. Stated he recently had a pneumonia at the end of August. He endorsed prior swallowing difficulties before his esophageal perforation where he previously felt like foods got stuck in his throat at times and he had to  wash them down with sips of liquid.    Respiratory Status Room air   Prior Level Of Function Swallowing   Prior Level Of Function Comment Clear liquids   General Observations Pt pleasant and cooperative   Patient/family Goals To advance diet to full liquids   Clinical Swallow Evaluation   Oral Musculature generally intact   Structural Abnormalities none present   Dentition present and adequate   Mucosal Quality adequate   Mandibular Strength and Mobility intact   Oral Labial Strength and Mobility WFL   Lingual Strength and Mobility WFL   Velar Elevation intact   Laryngeal Function Swallow;Voicing initiated;Cough   Oral Musculature Comments Occasional coughing present at baseline prior to any PO trials.    VFSS Evaluation   VFSS Additional Documentation Yes   VFSS Eval: Radiology   Radiologist Resident   Views Taken left lateral;A/P   Physical Location of Procedure [facility]   VFSS Eval: Thin Liquid Texture Trial   Mode of Presentation, Thin Liquid spoon;cup;straw;self-fed   Order of Presentation 1, 2, 3, 4, 5, 6, 13  (13-AP view)   Preparatory Phase WFL   Oral Phase, Thin Liquid WFL   Pharyngeal Phase, Thin Liquid WFL   Rosenbek's Penetration Aspiration Scale: Thin Liquid Trial Results 2 - contrast enters airway, remains above the vocal cords, no residue remains (penetration)   Diagnostic Statement Pt demonstrates prompt swallow response with generally effective airway protection. 1x shallow flash penetration present with serial sips of thin liquids. No aspiration present.   Swallow Compensations   Swallow Compensations No compensations were used   Educational Assessment   Barriers to Learning No barriers   Esophageal Phase of Swallow   Patient reports or presents with symptoms of esophageal dysphagia Yes   Esophageal sweep performed during today s vidofluoroscopic exam  Yes;Please refer to radiologist's report for details   Esophageal comments Esophagram completed after VFSS. Please refer to radiology report.  During swallow of thin liquid in AP view (see image #13), question narrowing near cervical esophagus.    Swallow Eval: Clinical Impressions   Skilled Criteria for Therapy Intervention No problems identified which require skilled intervention   Dysphagia Outcome Severity Scale (CARLOTA) Level 6 - CARLOTA   Treatment Diagnosis Oral and pharyngeal swallow WFL   Diet texture recommendations Clear liquid  (Advance diet as tolerated per pt's medical team)   Recommended Feeding/Eating Techniques small sips/bites   Rehab Potential good, to achieve stated therapy goals   Anticipated Discharge Disposition home   Risks and Benefits of Treatment have been explained. Yes   Patient, family and/or staff in agreement with Plan of Care Yes   Clinical Impression Comments Pt presents with functional oral and pharyngeal swallow. Oral phase is WNL and characterized by adequate strength, ROM, and coordination of oral musculature resulting in adequate bolus control and oral clearance. Pharyngeal phase is marked by prompt swallow response with generally effective airway protection. 1x shallow flash penetration present with serial sips of thin liquids; no aspiration present during study. Question narrowing in cervical esophagus upon AP view of swallow (see image #13). Esophagram completed after VFSS; please refer to radiology report for complete details.     Given results of today's VFSS, pt is at low risk of aspiration. Pt demonstrates baseline coughing without PO intake and coughing does not appear to be related to swallowing function given results of today's study. Recommend ongoing clear liquid diet with diet to be advanced as tolerated per pt's medical team. Pt participated in education re: results/recommendations of VFSS. Recommend small sips of thin liquids to ensure most effective airway protection. No additional SLP services are warranted at this time.    Total Session Time   Total Session Time 40   Total Evaluation Time 40     Thank you  for the referral of Tate Constantino. If you have any questions about this report, please contact me using the information below.     Sangeetha Quintero MA, CCC-SLP  Speech-Language Pathologist   [facility]  Department of Otolaryngology/D&T - 4th floor  Pager: [phone]  Phone: [phone]  Email: [email or web address]

## 2018-11-11 ASSESSMENT — ENCOUNTER SYMPTOMS
BLOATING: 1
DIARRHEA: 0
HOARSE VOICE: 0
SNORES LOUDLY: 0
COUGH DISTURBING SLEEP: 1
DYSPNEA ON EXERTION: 0
BOWEL INCONTINENCE: 0
HEMOPTYSIS: 0
TROUBLE SWALLOWING: 0
CONSTIPATION: 0
ABDOMINAL PAIN: 0
SHORTNESS OF BREATH: 0
SORE THROAT: 1
TASTE DISTURBANCE: 0
JAUNDICE: 0
SPUTUM PRODUCTION: 0
NECK MASS: 0
HEARTBURN: 1
SMELL DISTURBANCE: 0
SINUS PAIN: 0
WHEEZING: 0
BLOOD IN STOOL: 0
COUGH: 1
SINUS CONGESTION: 1
RECTAL PAIN: 0
NAUSEA: 1
VOMITING: 0

## 2018-11-14 ENCOUNTER — OFFICE VISIT (OUTPATIENT)
Dept: SURGERY | Facility: CLINIC | Age: 38
End: 2018-11-14
Attending: CLINICAL NURSE SPECIALIST
Payer: COMMERCIAL

## 2018-11-14 VITALS
WEIGHT: 177 LBS | TEMPERATURE: 98.8 F | OXYGEN SATURATION: 99 % | SYSTOLIC BLOOD PRESSURE: 140 MMHG | BODY MASS INDEX: 29.45 KG/M2 | HEART RATE: 61 BPM | DIASTOLIC BLOOD PRESSURE: 89 MMHG

## 2018-11-14 DIAGNOSIS — K22.3 ESOPHAGEAL PERFORATION: ICD-10-CM

## 2018-11-14 DIAGNOSIS — K22.4 ESOPHAGEAL DYSMOTILITY: ICD-10-CM

## 2018-11-14 DIAGNOSIS — K22.3 ESOPHAGEAL PERFORATION: Primary | ICD-10-CM

## 2018-11-14 PROCEDURE — G0463 HOSPITAL OUTPT CLINIC VISIT: HCPCS | Mod: ZF

## 2018-11-14 ASSESSMENT — PAIN SCALES - GENERAL: PAINLEVEL: NO PAIN (0)

## 2018-11-14 NOTE — LETTER
11/14/2018       RE: Tate Constantino  [address] [place] [address]     Dear Colleague,    Thank you for referring your patient, Tate Constantino, to the [facility]. Please see a copy of my visit note below.    REASON FOR VISIT:  Removal of G-J tube    PROCEDURES PERFORMED:   7/21/18  PROCEDURES PERFORMED:   1.  Esophagogastroduodenoscopy.   2.  Percutaneous endoscopic gastrostomy tube placement.   3.  Gastrojejunostomy feeding tube placement.   4.  Fluoroscopy with interpretation of images.   5.  Right tube thoracostomy.   6.  Left thoracotomy with primary repair of esophageal perforation and intercostal muscle flap reinforcement.   7.  Esophageal stent placement (16 x 120 mm, fully covered).   8.  Flexible bronchoscopy.     8/15/18  PROCEDURE PERFORMED:  Upper endoscopy and stent removal    SURGEON:  Dr. Allyn Copeland    SPEECH THERAPY SWALLOW STUDY 9/18/18  Pt presents with functional oral and pharyngeal swallow. Oral phase is WNL and characterized by adequate strength, ROM, and coordination of oral musculature resulting in adequate bolus control and oral clearance. Pharyngeal phase is marked by prompt swallow response with generally effective airway protection. 1x shallow flash penetration present with serial sips of thin liquids; no aspiration present during study. Question narrowing in cervical esophagus upon AP view of swallow (see image #13). Esophagram completed after VFSS; please refer to radiology report for complete details.      Given results of today's VFSS, pt is at low risk of aspiration. Pt demonstrates baseline coughing without PO intake and coughing does not appear to be related to swallowing function given results of today's study. Recommend ongoing clear liquid diet with diet to be advanced as tolerated per pt's medical team. Pt participated in education re: results/recommendations of VFSS. Recommend small sips of thin liquids to ensure most effective airway  "protection. No additional SLP services are warranted at this time.    XRAY VIDEO SWALLOW WITH ESOPHAGRAM:    IMPRESSION:   1. Flash penetration without aspiration of thin liquid barium. Please  see speech pathologist report for further details.  2. Normal esophagram.  History of Present Illness:    Assessment:  Tate is a 37 yo male who experienced an esophageal perforation in July and was ultimately transferred from an [facility] to our facility for definitive care and management.  He reports that he is only using his feeding tube for liquid omeprazole and vitamins which he can start taking orally.   He eats a regular diet including hamburgers and pizza, but states he is unable to swallow tablets \"unless I can chew them\".   He has an occasional cough but this does not specifically happen when eating.   He denies food sticking or regurgitation.   He does experience some heartburn even while on omeprazole.  He was vague when questioned about when he takes the omeprazole in relation to meals, is inconsistent and sometimes takes it in the morning or late in the day.  His weight has been stable.    After explaining the removal process for the G-J tube, it was removed without incident.  Upon inspection, the entire tube was intact.   A gauze dressing was applied and after care reviewed with him.        Plan:   Instructed him to take omeprazole at least 30 minutes before eating.  In addition, I told him he may want to try taking it at night, as well, for better control of his heartburn.   He was advised to not eat within 2 hours of bedtime.     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     Return PRN    Total time:  30 minutes    STEPHEN Cunningham, OSCAR          "

## 2018-11-14 NOTE — MR AVS SNAPSHOT
After Visit Summary   11/14/2018    Tate Constantino    MRN: [id number]           Patient Information     Date Of Birth          1980        Visit Information        Provider Department      11/14/2018 10:00 AM Kami Foote APRN CNS [facility]        Today's Diagnoses     Esophageal perforation    -  1       Follow-ups after your visit        Follow-up notes from your care team     Return if symptoms worsen or fail to improve.      Who to contact     If you have questions or need follow up information about today's clinic visit or your schedule please contact [facility] directly at [phone].  Normal or non-critical lab and imaging results will be communicated to you by [employer], letter or phone within 4 business days after the clinic has received the results. If you do not hear from us within 7 days, please contact the clinic through [employer] or phone. If you have a critical or abnormal lab result, we will notify you by phone as soon as possible.  Submit refill requests through [employer] or call your pharmacy and they will forward the refill request to us. Please allow 3 business days for your refill to be completed.          Additional Information About Your Visit        MyChart Information     [employer] gives you secure access to your electronic health record. If you see a primary care provider, you can also send messages to your care team and make appointments. If you have questions, please call your primary care clinic.  If you do not have a primary care provider, please call [phone] and they will assist you.        Care EveryWhere ID     This is your Care EveryWhere ID. This could be used by other organizations to access your [place] medical records  [id number]        Your Vitals Were     Pulse Temperature Pulse Oximetry BMI (Body Mass Index)          61 98.8  F (37.1  C) (Oral) 99% 29.45 kg/m2         Blood Pressure from Last 3 Encounters:    11/14/18 140/89   09/18/18 129/84   09/04/18 106/72    Weight from Last 3 Encounters:   11/14/18 80.3 kg (177 lb)   09/18/18 73.9 kg (163 lb)   09/04/18 76.8 kg (169 lb 4.8 oz)              Today, you had the following     No orders found for display       Primary Care Provider Office Phone # Fax #    Ant Mo [phone]       [facility] [address]  [facility] [address]        Equal Access to Services     [facility] Hospital[facility]ALEN : Hadii aad ku hadasho Soomaali, waaxda luqadaha, qaybta kaalmada adeegyada, nayeli tobias . So [facility] [phone].    ATENCIÓN: Si habla español, tiene a page disposición servicios gratuitos de asistencia lingüística. [facility] [phone].    We comply with applicable federal civil rights laws and [place] laws. We do not discriminate on the basis of race, color, national origin, age, disability, sex, sexual orientation, or gender identity.            Thank you!     Thank you for choosing [facility]  for your care. Our goal is always to provide you with excellent care. Hearing back from our patients is one way we can continue to improve our services. Please take a few minutes to complete the written survey that you may receive in the mail after your visit with us. Thank you!             Your Updated Medication List - Protect others around you: Learn how to safely use, store and throw away your medicines at www.disposemymeds.org.          This list is accurate as of 11/14/18 11:59 PM.  Always use your most recent med list.                   Brand Name Dispense Instructions for use Diagnosis    acetaminophen 32 mg/mL solution    TYLENOL    400 mL    20.3 mLs (650 mg) by Per J Tube route every 4 hours    Esophageal perforation       BRONKAID PO      Take 12.524 mg by mouth as needed (for phlegm)        GuaiFENesin 200 MG/5ML Liqd      200 mg by Nasogastric route        MULTIvitamin  S Caps      Take 1 capsule  by mouth daily        ondansetron 4 MG ODT tab    ZOFRAN-ODT    120 tablet    Take 1 tablet (4 mg) by mouth every 6 hours as needed for nausea or vomiting    Esophageal perforation       oxyCODONE 5 MG/5ML solution    ROXICODONE    350 mL    5-10 mLs (5-10 mg) by Per J Tube route every 4 hours as needed for moderate to severe pain    Esophageal perforation       pantoprazole 2 mg/mL Susp suspension    PROTONIX    100 mL    20 mLs (40 mg) by Per Feeding Tube route daily    Esophageal perforation       prochlorperazine 10 MG tablet    COMPAZINE    90 tablet    Take 1 tablet (10 mg) by mouth every 6 hours as needed for nausea or vomiting    Esophageal perforation       senna-docusate 8.6-50 MG per tablet    SENOKOT-S;PERICOLACE    120 tablet    Take 2 tablets by mouth 2 times daily    Bowel habit changes       SYMBICORT 160-4.5 MCG/ACT Inhaler   Generic drug:  budesonide-formoterol      Inhale 2 puffs into the lungs

## 2018-11-14 NOTE — NURSING NOTE
"Oncology Rooming Note    November 14, 2018 9:58 AM   Tate Constantino is a 38 year old male who presents for:    Chief Complaint   Patient presents with     Oncology Clinic Visit     Feeding Tube removal     Initial Vitals: /89  Pulse 61  Temp 98.8  F (37.1  C) (Oral)  Wt 80.3 kg (177 lb)  SpO2 99%  BMI 29.45 kg/m2 Estimated body mass index is 29.45 kg/(m^2) as calculated from the following:    Height as of 9/18/18: 1.651 m (5' 5\").    Weight as of this encounter: 80.3 kg (177 lb). Body surface area is 1.92 meters squared.  No Pain (0) Comment: Data Unavailable   No LMP for male patient.  Allergies reviewed: Yes  Medications reviewed: Yes    Medications: Medication refills not needed today.  Pharmacy name entered into [employer]:    [facility] [address] [place] [address] - [address]  [facility] - [place], [place] - [address]    Clinical concerns: Pt has list to discuss  Kami  was notified.    8 minutes for nursing intake (face to face time)     Livia Castillo CMA              "

## 2018-11-15 NOTE — PROGRESS NOTES
REASON FOR VISIT:  Removal of G-J tube    PROCEDURES PERFORMED:   7/21/18  PROCEDURES PERFORMED:   1.  Esophagogastroduodenoscopy.   2.  Percutaneous endoscopic gastrostomy tube placement.   3.  Gastrojejunostomy feeding tube placement.   4.  Fluoroscopy with interpretation of images.   5.  Right tube thoracostomy.   6.  Left thoracotomy with primary repair of esophageal perforation and intercostal muscle flap reinforcement.   7.  Esophageal stent placement (16 x 120 mm, fully covered).   8.  Flexible bronchoscopy.     8/15/18  PROCEDURE PERFORMED:  Upper endoscopy and stent removal    SURGEON:  Dr. Allyn Copeland    SPEECH THERAPY SWALLOW STUDY 9/18/18  Pt presents with functional oral and pharyngeal swallow. Oral phase is WNL and characterized by adequate strength, ROM, and coordination of oral musculature resulting in adequate bolus control and oral clearance. Pharyngeal phase is marked by prompt swallow response with generally effective airway protection. 1x shallow flash penetration present with serial sips of thin liquids; no aspiration present during study. Question narrowing in cervical esophagus upon AP view of swallow (see image #13). Esophagram completed after VFSS; please refer to radiology report for complete details.      Given results of today's VFSS, pt is at low risk of aspiration. Pt demonstrates baseline coughing without PO intake and coughing does not appear to be related to swallowing function given results of today's study. Recommend ongoing clear liquid diet with diet to be advanced as tolerated per pt's medical team. Pt participated in education re: results/recommendations of VFSS. Recommend small sips of thin liquids to ensure most effective airway protection. No additional SLP services are warranted at this time.    XRAY VIDEO SWALLOW WITH ESOPHAGRAM:    IMPRESSION:   1. Flash penetration without aspiration of thin liquid barium. Please  see speech pathologist report for further details.  2.  "Normal esophagram.  History of Present Illness:    Assessment:  Tate is a 37 yo male who experienced an esophageal perforation in July and was ultimately transferred from an [facility] to our facility for definitive care and management.  He reports that he is only using his feeding tube for liquid omeprazole and vitamins which he can start taking orally.   He eats a regular diet including hamburgers and pizza, but states he is unable to swallow tablets \"unless I can chew them\".   He has an occasional cough but this does not specifically happen when eating.   He denies food sticking or regurgitation.   He does experience some heartburn even while on omeprazole.  He was vague when questioned about when he takes the omeprazole in relation to meals, is inconsistent and sometimes takes it in the morning or late in the day.  His weight has been stable.    After explaining the removal process for the G-J tube, it was removed without incident.  Upon inspection, the entire tube was intact.   A gauze dressing was applied and after care reviewed with him.        Plan:   Instructed him to take omeprazole at least 30 minutes before eating.  In addition, I told him he may want to try taking it at night, as well, for better control of his heartburn.   He was advised to not eat within 2 hours of bedtime.     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     Return PRN    Total time:  30 minutes    STEPHEN Cunningham, CNS  Answers for HPI/ROS submitted by the patient on 11/11/2018   General Symptoms: No  Skin Symptoms: No  HENT Symptoms: Yes  EYE SYMPTOMS: No  HEART SYMPTOMS: No  LUNG SYMPTOMS: Yes  INTESTINAL SYMPTOMS: Yes  URINARY SYMPTOMS: No  REPRODUCTIVE SYMPTOMS: No  SKELETAL SYMPTOMS: No  BLOOD SYMPTOMS: No  NERVOUS SYSTEM SYMPTOMS: No  MENTAL HEALTH SYMPTOMS: No  Ear pain: No  Ear discharge: No  Hearing loss: No  Tinnitus: No  Nosebleeds: No  Congestion: Yes  Sinus pain: " No  Trouble swallowing: No   Voice hoarseness: No  Mouth sores: No  Sore throat: Yes  Tooth pain: No  Gum tenderness: No  Bleeding gums: No  Change in taste: No  Change in sense of smell: No  Dry mouth: No  Hearing aid used: No  Neck lump: No  Cough: Yes  Sputum or phlegm: No  Coughing up blood: No  Difficulty breating or shortness of breath: No  Snoring: No  Wheezing: No  Difficulty breathing on exertion: No  Nighttime Cough: Yes  Heart burn or indigestion: Yes  Nausea: Yes  Vomiting: No  Abdominal pain: No  Bloating: Yes  Constipation: No  Diarrhea: No  Blood in stool: No  Black stools: No  Rectal or Anal pain: No  Fecal incontinence: No  Yellowing of skin or eyes: No  Vomit with blood: No  Change in stools: No

## 2019-03-08 ENCOUNTER — TRANSFERRED RECORDS (OUTPATIENT)
Dept: HEALTH INFORMATION MANAGEMENT | Facility: CLINIC | Age: 39
End: 2019-03-08

## 2019-03-15 ENCOUNTER — TELEPHONE (OUTPATIENT)
Dept: SURGERY | Facility: CLINIC | Age: 39
End: 2019-03-15

## 2019-03-18 ENCOUNTER — TELEPHONE (OUTPATIENT)
Dept: SURGERY | Facility: CLINIC | Age: 39
End: 2019-03-18

## 2019-03-18 ENCOUNTER — PATIENT OUTREACH (OUTPATIENT)
Dept: SURGERY | Facility: CLINIC | Age: 39
End: 2019-03-18

## 2019-03-18 DIAGNOSIS — R93.89 ABNORMAL CHEST CT: Primary | ICD-10-CM

## 2019-03-18 NOTE — PROGRESS NOTES
Tate Constantino was scheduled for a consultation with Dr. Castro 4/5 for evaluation of a FB in left lung.  The patient was incorrectly scheduled with General Surgery and the appointment was cancelled.    Kami LORENZO in Thoracic has reached out to the patient after evaluating his CT scans at the Lung Nodule Conference.    Attempted to reach patient to make him aware that appointment with Dr. Castro on 4/5 was cancelled.  Follow up should take place with Thoracic if needed.  No answer- LM on VM with above information and contact number for the Thoracic Team.

## 2019-03-18 NOTE — TELEPHONE ENCOUNTER
I attempted to reach patient to discuss the referral from [facility] regarding a possible retained left chest tube seen on recent imaging.   See our Team Conference note for further details/consensus.    I asked him to call me to discuss.   Will await his call back.    Addendum:  3/18/19   Re-attempt made to reach Tate, left another VM with my direct # and asked him to call me.

## 2019-03-18 NOTE — TELEPHONE ENCOUNTER
"Tate called me back to say he had been getting evaluated by a GI doctor for persistent belching, had tried Gas-X which diminished the belching but made him feel uncomfortable and bloated because \"then I couldn't burp\".   He said he has pain on left abdomen above previous PEG site.    He is not experiencing any lung pain/issues related to what Eddie felt could be a retained left chest tube.    He would like to see Dr. Copeland again; I will order a chest/abd CT scan and UGI xray prior.   Will arrange on 4/4 to see Dr. Copeland with imaging prior.    "

## 2019-04-02 NOTE — PROGRESS NOTES
THORACIC SURGERY FOLLOW UP VISIT    Dear Dr. Wells,  I saw Mr. Tate Constantino in follow-up today. The clinical summary follows:    PREOP DIAGNOSIS   Esophageal perforation with mediastinal abscess  PROCEDURE   7/21/2018 -- EGD, PEG-J placement, 16mm alimaxx esophageal stent placement, left thoracotomy with esophageal repair and intercostal muscle flap, bilateral chest tube placement, bronchoscopy  8/15/2018 -- EGD with stent removal  COMPLICATIONS  None    INTERVAL STUDIES  4/4/2019 -- Esophagram  No leak noted    4/4/2019 -- CT chest abdomen pelvis  No significant changes from prior  Small hiatal hernia  No acute pathology at G-J site      3/8/2019 -- CT chest abdomen pelvis ([facility])  Constipation with stool burden noted  Area of left lower lobe linear opacity near the EJ junction      ETOH Infrequently (last drink was ~1 yr ago)  TOB None  BMI 29.45      SUBJECTIVE  Tate returns today after his GJ tube was removed 11/2018 with increased belching and pain at his G tube site. He states the pain has been constant, non-radiating and is more of an annoyance than being overbearing. He is also have some pain with palpation at his lower left ribs near his incision but not at rest. 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    IMPRESSION   38 year-old male with concern for pain at G-J tube site.     PLAN  I spent a total of 30 minutes with Mr. Tate Constantino, more than 50% of which were spent in counseling, coordination of care, and face-to-face time. I reviewed the plan as follows:  Necessary Tests & Appointments: Follow up with GI for reflux and follow up of dysphagia  No further thoracic surgery follow up needed at this point  All questions were answered and the patient and present family were in agreement with the plan.  I appreciate the opportunity  to participate in the care of your patient and will keep you updated.  Sincerely,

## 2019-04-04 ENCOUNTER — ANCILLARY PROCEDURE (OUTPATIENT)
Dept: CT IMAGING | Facility: CLINIC | Age: 39
End: 2019-04-04
Attending: CLINICAL NURSE SPECIALIST
Payer: COMMERCIAL

## 2019-04-04 ENCOUNTER — OFFICE VISIT (OUTPATIENT)
Dept: SURGERY | Facility: CLINIC | Age: 39
End: 2019-04-04
Attending: STUDENT IN AN ORGANIZED HEALTH CARE EDUCATION/TRAINING PROGRAM
Payer: COMMERCIAL

## 2019-04-04 ENCOUNTER — ANCILLARY PROCEDURE (OUTPATIENT)
Dept: GENERAL RADIOLOGY | Facility: CLINIC | Age: 39
End: 2019-04-04
Attending: CLINICAL NURSE SPECIALIST
Payer: COMMERCIAL

## 2019-04-04 VITALS
WEIGHT: 177 LBS | OXYGEN SATURATION: 97 % | BODY MASS INDEX: 29.49 KG/M2 | HEART RATE: 52 BPM | SYSTOLIC BLOOD PRESSURE: 132 MMHG | DIASTOLIC BLOOD PRESSURE: 83 MMHG | TEMPERATURE: 97.8 F | HEIGHT: 65 IN | RESPIRATION RATE: 14 BRPM

## 2019-04-04 DIAGNOSIS — R93.89 ABNORMAL CHEST CT: ICD-10-CM

## 2019-04-04 DIAGNOSIS — K22.3 ESOPHAGEAL PERFORATION: Primary | ICD-10-CM

## 2019-04-04 PROBLEM — E78.5 DYSLIPIDEMIA: Status: ACTIVE | Noted: 2019-04-04

## 2019-04-04 PROBLEM — F32.A DEPRESSION: Status: ACTIVE | Noted: 2019-04-04

## 2019-04-04 PROCEDURE — G0463 HOSPITAL OUTPT CLINIC VISIT: HCPCS | Mod: ZF

## 2019-04-04 RX ORDER — CAFFEINE 200 MG
200 TABLET ORAL
COMMUNITY

## 2019-04-04 RX ADMIN — DIATRIZOATE MEGLUMINE AND DIATRIZOATE SODIUM 20 ML: 660; 100 SOLUTION ORAL; RECTAL at 09:02

## 2019-04-04 ASSESSMENT — MIFFLIN-ST. JEOR: SCORE: 1639.75

## 2019-04-04 ASSESSMENT — PAIN SCALES - GENERAL: PAINLEVEL: MILD PAIN (3)

## 2019-04-04 NOTE — LETTER
4/4/2019       RE: Tate Constantino  [address]  [facility] [id number]     Dear Colleague,    Thank you for referring your patient, Tate Constantino, to the [facility]. Please see a copy of my visit note below.    THORACIC SURGERY FOLLOW UP VISIT    Dear Dr. Wells,  I saw Mr. Tate Constantino in follow-up today. The clinical summary follows:    PREOP DIAGNOSIS   Esophageal perforation with mediastinal abscess  PROCEDURE   7/21/2018 -- EGD, PEG-J placement, 16mm alimaxx esophageal stent placement, left thoracotomy with esophageal repair and intercostal muscle flap, bilateral chest tube placement, bronchoscopy  8/15/2018 -- EGD with stent removal  COMPLICATIONS  None    INTERVAL STUDIES  4/4/2019 -- Esophagram  No leak noted    4/4/2019 -- CT chest abdomen pelvis  No significant changes from prior  Small hiatal hernia  No acute pathology at G-J site      3/8/2019 -- CT chest abdomen pelvis ([facility] clinic)  Constipation with stool burden noted  Area of left lower lobe linear opacity near the EJ junction      ETOH Infrequently (last drink was ~1 yr ago)  TOB None  BMI 29.45      SUBJECTIVE  Tate returns today after his GJ tube was removed 11/2018 with increased belching and pain at his G tube site. He states the pain has been constant, non-radiating and is more of an annoyance than being overbearing. He is also have some pain with palpation at his lower left ribs near his incision but not at rest. 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    IMPRESSION   38 year-old male with concern for pain at G-J tube site.     PLAN  I spent a total of 30 minutes with Mr. Tate Constantino, more than 50% of which were spent in counseling, coordination of care, and face-to-face time. I reviewed the plan as follows:  Necessary Tests & Appointments:  Follow up with GI for reflux and follow up of dysphagia  No further thoracic surgery follow up needed at this point  All questions were answered and the patient and present family were in agreement with the plan.  I appreciate the opportunity to participate in the care of your patient and will keep you updated.  Sincerely,        Allyn Copeland MD

## 2019-04-04 NOTE — NURSING NOTE
"Oncology Rooming Note    April 4, 2019 9:29 AM   Tate Constantino is a 38 year old male who presents for:    Chief Complaint   Patient presents with     Oncology Clinic Visit     Return Esophageal Perforation     Initial Vitals: /83   Pulse 52   Temp 97.8  F (36.6  C) (Oral)   Resp 14   Ht 1.651 m (5' 5\")   Wt 80.3 kg (177 lb)   SpO2 97%   BMI 29.45 kg/m   Estimated body mass index is 29.45 kg/m  as calculated from the following:    Height as of this encounter: 1.651 m (5' 5\").    Weight as of this encounter: 80.3 kg (177 lb). Body surface area is 1.92 meters squared.  Mild Pain (3) Comment: above where the feeding tube was   No LMP for male patient.  Allergies reviewed: Yes  Medications reviewed: Yes    Medications: Medication refills not needed today.  Pharmacy name entered into [employer]:    [facility] [address] [place], [place] - [address] [facility] [address]  [facility] - [place], [place] - [address]    Clinical concerns: Follow up on CT scan; rib pain; pain where the feeding tube was      Marcella Abdalla CMA              "

## 2019-11-07 ENCOUNTER — HEALTH MAINTENANCE LETTER (OUTPATIENT)
Age: 39
End: 2019-11-07

## 2020-11-29 ENCOUNTER — HEALTH MAINTENANCE LETTER (OUTPATIENT)
Age: 40
End: 2020-11-29

## 2021-09-25 ENCOUNTER — HEALTH MAINTENANCE LETTER (OUTPATIENT)
Age: 41
End: 2021-09-25

## 2022-01-09 ENCOUNTER — HEALTH MAINTENANCE LETTER (OUTPATIENT)
Age: 42
End: 2022-01-09

## 2022-10-04 NOTE — PLAN OF CARE
Problem: Patient Care Overview  Goal: Plan of Care/Patient Progress Review  Discharge Planner PT   Patient plan for discharge: home   Current status: PT eval completed, tx indicated. Pt completed bed mobility/supine>sit with HOB fully elevated and mod A for management of trunk 2/2 pain. Sat at EOB ~10' for recovery 2/2 abdominal pain/coughing. AVSS. Pt completed STS with min A - able to take steps to perform pivot transfer bed>chair with min A.   Barriers to return to prior living situation: medical needs, current mobility, level of A   Recommendations for discharge: anticipate pt will progress with therapy to dc home.   Rationale for recommendations: Pt limited by pain and lines/tubes today - POD 1. Anticipate with pain management pt will progress safely with therapy.        Entered by: Luciana Matute 07/22/2018 2:43 PM            PHYSICAL EXAM:    GENERAL: Alert, appears stated age, well appearing, non-toxic  SKIN: Warm, pink and dry. MMM.   HEAD: NC, AT  EYE: Normal lids/conjunctiva  ENT: Normal hearing, patent oropharynx   NECK: +supple. No meningismus, or JVD  Pulm: Bilateral BS, normal resp effort, no wheezes, stridor, or retractions  CV: RRR, no M/R/G, 2+and = radial pulses  Abd: soft, non-tender, non-distended, no rebound/guarding    Mskel: no erythema, cyanosis, edema. no calf tenderness  Neuro: AAOx3. 5/5 strength throughout. normal gait.

## 2022-12-26 ENCOUNTER — HEALTH MAINTENANCE LETTER (OUTPATIENT)
Age: 42
End: 2022-12-26

## 2023-04-16 ENCOUNTER — HEALTH MAINTENANCE LETTER (OUTPATIENT)
Age: 43
End: 2023-04-16

## 2023-11-15 NOTE — PLAN OF CARE
T max 99, otherwise VSS. Pain controlled with oxycodone and tylenol. Sating in mid 90s on RA. Denies SOB. +BS. On clears but unable to tolerate PO intake. C/o dull pain with swallowing and ends up spitting liquid out. Denies nausea. Cycled TF infusing via J tube at goal rate of 110 ml/hr. Meds given through J tube. G tube clamped. BS monitored. Loose BM x1. Voiding in urinal in adequate amounts. Continue with poc.   Resident

## (undated) DEVICE — GOWN IMPERVIOUS BREATHABLE SMART XLG 89045

## (undated) DEVICE — SOL WATER IRRIG 1000ML BOTTLE 2F7114

## (undated) DEVICE — SUCTION MANIFOLD DORNOCH ULTRA CART UL-CL500

## (undated) DEVICE — CLIP HORIZON MED BLUE 002200

## (undated) DEVICE — SOL NACL 0.9% IRRIG 1000ML BOTTLE 2F7124

## (undated) DEVICE — SUCTION DRY CHEST DRAIN OASIS 3600-100

## (undated) DEVICE — CATH ANGIO SELECTIVE SOFT-VU 5FRX100CM JB1 10734102

## (undated) DEVICE — DEVICE SUTURE GRASPER TROCAR CLOSURE 14GA PMITCSG

## (undated) DEVICE — TUBE GASTROSTOMY PONSKY PULL DELUXE 20FR 000792

## (undated) DEVICE — SPONGE LAP 18X18" X8435

## (undated) DEVICE — BLADE CLIPPER SGL USE 9680

## (undated) DEVICE — DRAIN CHEST TUBE 24FR STR 8024

## (undated) DEVICE — SU MONOCRYL 4-0 PS-2 27" UND Y426H

## (undated) DEVICE — LINEN TOWEL PACK X30 5481

## (undated) DEVICE — DRSG GAUZE 2X2" 8042

## (undated) DEVICE — SU DERMABOND ADVANCED .7ML DNX12

## (undated) DEVICE — JELLY LUBRICATING SURGILUBE 2OZ TUBE

## (undated) DEVICE — SU SILK 0 TIE 6X30" A306H

## (undated) DEVICE — SUCTION TIP YANKAUER W/O VENT K86

## (undated) DEVICE — SU VICRYL 0 TIE 54" J608H

## (undated) DEVICE — LINEN GOWN XLG 5407

## (undated) DEVICE — SYR BULB IRRIG 50ML LATEX FREE 0035280

## (undated) DEVICE — WIRE GUIDE 0.035"X260CM NAVIPRO ANG 5625 M00556251

## (undated) DEVICE — LUBRICANT INST KIT ENDO-LUBE 220-90

## (undated) DEVICE — KIT ENDO FIRST STEP DISINFECTANT 200ML W/POUCH EP-4

## (undated) DEVICE — GLOVE PROTEXIS MICRO 6.0  2D73PM60

## (undated) DEVICE — ESU PENCIL SMOKE EVAC W/ROCKER SWITCH 0703-047-000

## (undated) DEVICE — DRAPE C-ARM W/STRAPS 42X72" 07-CA104

## (undated) DEVICE — CLIP HORIZON LG ORANGE 004200

## (undated) DEVICE — TUBING SUCTION 10'X3/16" N510

## (undated) DEVICE — SU VICRYL 2-0 SH 27" UND J417H

## (undated) DEVICE — Device

## (undated) DEVICE — INTR PEELAWAY 26FRX20CM G06447

## (undated) DEVICE — DRAIN CHEST TUBE 28FR STR 8028

## (undated) DEVICE — SU ETHIBOND 5 LRDA 30" B499T

## (undated) DEVICE — CATH TRAY FOLEY SURESTEP 16FR W/URNE MTR STLK LATEX A303316A

## (undated) DEVICE — SU PROLENE 4-0 SHDA 36" 8521H

## (undated) DEVICE — SU TICRON 2 GS-21DA 36" 3146-81

## (undated) DEVICE — TIES BANDING T50R

## (undated) DEVICE — DRAPE IOBAN INCISE 23X17" 6650EZ

## (undated) DEVICE — KIT CONNECTOR FOR OLYMPUS ENDOSCOPES DEFENDO 100310

## (undated) DEVICE — ESU PENCIL W/COATED BLADE E2450H

## (undated) DEVICE — LIGHT HANDLE X1 31140133

## (undated) DEVICE — WIRE GUIDE AMPLATZ SUPER STIFF 0.035"X260CM M001465261

## (undated) DEVICE — CLIP HORIZON SM RED WIDE SLOT 001201

## (undated) DEVICE — SU SILK 0 SH 30" K834H

## (undated) DEVICE — ENDO VALVE BX EVIS MAJ-210

## (undated) DEVICE — COVER CAMERA IN-LIGHT DISP LT-C02

## (undated) DEVICE — ESU ELEC BLADE 2.75" COATED/INSULATED E1455

## (undated) DEVICE — DRSG TELFA 3X8" 1238

## (undated) DEVICE — ESU GROUND PAD ADULT W/CORD E7507

## (undated) DEVICE — GLOVE PROTEXIS POWDER FREE 8.0 ORTHOPEDIC 2D73ET80

## (undated) DEVICE — SU VICRYL 3-0 SH 27" UND J416H

## (undated) DEVICE — SUCTION SLEEVE NEPTUNE 2 125MM 0703-005-125

## (undated) DEVICE — DRAPE SHEET MED 44X70" 9355

## (undated) DEVICE — ENDO BITE BLOCK ADULT OMNI-BLOC

## (undated) DEVICE — LINEN TOWEL PACK X6 WHITE 5487

## (undated) DEVICE — SU VICRYL 0 CTX 36" J370H

## (undated) DEVICE — SU VICRYL 2-0 CT-1 27" UND J259H

## (undated) DEVICE — CONNECTOR DRAIN CHEST Y EXTENSION SET 19909

## (undated) DEVICE — ENDO SYSTEM WATER BOTTLE & TUBING W/CO2 FILTER 00711549

## (undated) DEVICE — SU VICRYL 2 TP-1 54" UND J880T

## (undated) DEVICE — TUBE TRANS GASTROJEJUNOSTOMY 22FRX45CM  0250-22

## (undated) DEVICE — ENDO VALVE SUCTION BRONCH EVIS MAJ-209

## (undated) DEVICE — SYR 30ML SLIP TIP W/O NDL 302833

## (undated) DEVICE — DRAIN PENROSE 3/8X18" LATEX 0918020

## (undated) DEVICE — ENDO ADPT BRONCH SWIVEL Y A1002

## (undated) DEVICE — TAPE CLOTH 3" CARDINAL 3TRCL03

## (undated) DEVICE — WIPES FOLEY CARE SURESTEP PROVON DFC100

## (undated) DEVICE — DRAIN CHEST TUBE RIGHT ANGLED 28FR 8128

## (undated) DEVICE — PREP CHLORAPREP 26ML TINTED ORANGE  260815

## (undated) DEVICE — SU PLEDGET SOFT TFE 13MMX7MMX1.5MM D7044

## (undated) RX ORDER — PROPOFOL 10 MG/ML
INJECTION, EMULSION INTRAVENOUS
Status: DISPENSED
Start: 2018-07-21

## (undated) RX ORDER — FENTANYL CITRATE 50 UG/ML
INJECTION, SOLUTION INTRAMUSCULAR; INTRAVENOUS
Status: DISPENSED
Start: 2018-07-21

## (undated) RX ORDER — GLYCOPYRROLATE 0.2 MG/ML
INJECTION, SOLUTION INTRAMUSCULAR; INTRAVENOUS
Status: DISPENSED
Start: 2018-08-15

## (undated) RX ORDER — ONDANSETRON 2 MG/ML
INJECTION INTRAMUSCULAR; INTRAVENOUS
Status: DISPENSED
Start: 2018-08-15

## (undated) RX ORDER — HYDROMORPHONE HYDROCHLORIDE 1 MG/ML
INJECTION, SOLUTION INTRAMUSCULAR; INTRAVENOUS; SUBCUTANEOUS
Status: DISPENSED
Start: 2018-07-21

## (undated) RX ORDER — PHENYLEPHRINE HCL IN 0.9% NACL 1 MG/10 ML
SYRINGE (ML) INTRAVENOUS
Status: DISPENSED
Start: 2018-08-15

## (undated) RX ORDER — IOPAMIDOL 755 MG/ML
INJECTION, SOLUTION INTRAVASCULAR
Status: DISPENSED
Start: 2018-08-15

## (undated) RX ORDER — CALCIUM CHLORIDE 100 MG/ML
INJECTION INTRAVENOUS; INTRAVENTRICULAR
Status: DISPENSED
Start: 2018-07-21

## (undated) RX ORDER — FENTANYL CITRATE 50 UG/ML
INJECTION, SOLUTION INTRAMUSCULAR; INTRAVENOUS
Status: DISPENSED
Start: 2018-08-15

## (undated) RX ORDER — PHENYLEPHRINE HCL IN 0.9% NACL 1 MG/10 ML
SYRINGE (ML) INTRAVENOUS
Status: DISPENSED
Start: 2018-07-21

## (undated) RX ORDER — BUPIVACAINE HYDROCHLORIDE 2.5 MG/ML
INJECTION, SOLUTION EPIDURAL; INFILTRATION; INTRACAUDAL
Status: DISPENSED
Start: 2018-07-21

## (undated) RX ORDER — SODIUM CHLORIDE, SODIUM LACTATE, POTASSIUM CHLORIDE, CALCIUM CHLORIDE 600; 310; 30; 20 MG/100ML; MG/100ML; MG/100ML; MG/100ML
INJECTION, SOLUTION INTRAVENOUS
Status: DISPENSED
Start: 2018-07-21

## (undated) RX ORDER — EPHEDRINE SULFATE 50 MG/ML
INJECTION, SOLUTION INTRAMUSCULAR; INTRAVENOUS; SUBCUTANEOUS
Status: DISPENSED
Start: 2018-08-15

## (undated) RX ORDER — DEXAMETHASONE SODIUM PHOSPHATE 4 MG/ML
INJECTION, SOLUTION INTRA-ARTICULAR; INTRALESIONAL; INTRAMUSCULAR; INTRAVENOUS; SOFT TISSUE
Status: DISPENSED
Start: 2018-08-15

## (undated) RX ORDER — OXYCODONE HCL 5 MG/5 ML
SOLUTION, ORAL ORAL
Status: DISPENSED
Start: 2018-08-15

## (undated) RX ORDER — ONDANSETRON 2 MG/ML
INJECTION INTRAMUSCULAR; INTRAVENOUS
Status: DISPENSED
Start: 2018-07-21

## (undated) RX ORDER — GLYCOPYRROLATE 0.2 MG/ML
INJECTION, SOLUTION INTRAMUSCULAR; INTRAVENOUS
Status: DISPENSED
Start: 2018-07-21

## (undated) RX ORDER — ESMOLOL HYDROCHLORIDE 10 MG/ML
INJECTION INTRAVENOUS
Status: DISPENSED
Start: 2018-07-21